# Patient Record
Sex: MALE | Race: ASIAN | NOT HISPANIC OR LATINO | Employment: OTHER | URBAN - METROPOLITAN AREA
[De-identification: names, ages, dates, MRNs, and addresses within clinical notes are randomized per-mention and may not be internally consistent; named-entity substitution may affect disease eponyms.]

---

## 2017-01-04 ENCOUNTER — TELEPHONE (OUTPATIENT)
Dept: MEDICAL GROUP | Facility: CLINIC | Age: 62
End: 2017-01-04

## 2017-01-04 NOTE — TELEPHONE ENCOUNTER
Patient would like to have a referral put in for ENT Dr. Angela Stanford.    He understand that if he needs an appointment prior that ok.

## 2017-01-05 NOTE — TELEPHONE ENCOUNTER
Please inform patient, we need to diagnosis for referral, he also has make a appointment for new primary care physician, Dr. Salguero no longer is in our office. Thanks

## 2017-02-10 ENCOUNTER — OFFICE VISIT (OUTPATIENT)
Dept: MEDICAL GROUP | Facility: CLINIC | Age: 62
End: 2017-02-10
Payer: OTHER GOVERNMENT

## 2017-02-10 VITALS
RESPIRATION RATE: 18 BRPM | WEIGHT: 140 LBS | HEIGHT: 64 IN | BODY MASS INDEX: 23.9 KG/M2 | DIASTOLIC BLOOD PRESSURE: 80 MMHG | TEMPERATURE: 98.4 F | HEART RATE: 76 BPM | SYSTOLIC BLOOD PRESSURE: 142 MMHG | OXYGEN SATURATION: 96 %

## 2017-02-10 DIAGNOSIS — Z85.46 HISTORY OF PROSTATE CANCER: ICD-10-CM

## 2017-02-10 DIAGNOSIS — N52.31 ERECTILE DYSFUNCTION FOLLOWING RADICAL PROSTATECTOMY: ICD-10-CM

## 2017-02-10 DIAGNOSIS — H91.92: ICD-10-CM

## 2017-02-10 DIAGNOSIS — D50.9 IRON DEFICIENCY ANEMIA, UNSPECIFIED IRON DEFICIENCY ANEMIA TYPE: ICD-10-CM

## 2017-02-10 PROCEDURE — 99214 OFFICE O/P EST MOD 30 MIN: CPT | Performed by: INTERNAL MEDICINE

## 2017-02-10 RX ORDER — ASCORBIC ACID 500 MG
500 TABLET ORAL DAILY
COMMUNITY
End: 2017-07-28

## 2017-02-10 NOTE — PROGRESS NOTES
"Subjective:  Christophe is a 61 y.o. male with the following   Past Medical History   Diagnosis Date   • Cancer (CMS-HCC) 2013     prostate      Family History   Problem Relation Age of Onset   • Cancer Father      prostate, age 90   • Diabetes Neg Hx    • Heart Disease Neg Hx    • Stroke Neg Hx      The patient is on the following medications:   Current outpatient prescriptions:   •  ascorbic acid (ASCORBIC ACID) 500 MG Tab, Take 500 mg by mouth every day., Disp: , Rfl:   •  Multiple Vitamin (ONE-A-DAY 55 PLUS PO), Take  by mouth., Disp: , Rfl:   •  ferrous sulfate 325 (65 FE) MG EC tablet, Take 1 Tab by mouth 3 times a day, with meals., Disp: 90 Tab, Rfl: 3    HPI; 61-year-old male patient is here to establish care, currently on ferrous sulfate for history of iron deficiency anemia denies having GI bleed, fatigue or shortness of breath. Patient has had history of radical prostatectomy for prostate cancer currently followed by urologist, denies having nocturia but complaining of erectile dysfunction after the surgery several years ago, previously responded to Viagra, patient prefers no medications requesting alternative therapies. Patient also complains of left ear diminished hearing requesting referral for audiology.  ROS:  See HPI    Blood pressure 142/80, pulse 76, temperature 36.9 °C (98.4 °F), resp. rate 18, height 1.626 m (5' 4.02\"), weight 63.504 kg (140 lb), SpO2 96 %.on RA  Objective:  Patient is well appearing and in no acute distress.  Pharynx is clear.  Neck is soft and supple with no cervical or supraclavicular lymphadenopathy, thyromegaly or masses, no JVD.  Lungs clear to auscultation bilaterally with normal respiratory effort. Abdomen soft, non-tender on palpation,not distended. Heart regular rate and rhythm without murmur. Extremities without any clubbing, cyanosis, or edema.    Assessment and Plan:  1. History of iron deficiency anemia; status post colonoscopy, on ferrous sulfate 325 mg 3 times a day, " followed by gastroenterology.      2. History of prostate cancer; status post radical prostatectomy.        3. ED; per patient  started after prostatectomy, previously responded to Viagra but prefers no medical therapy.        4. Left ear hearing loss; no significant cerumen accumulation. History of exposure to loud noises in .      Patient is advised on preventive and supportive care, diet and lifestyle modification, daily walking ,exercise and weight loss. Discussed alternative therapies, referral to audiology, monitor labs.     Please note that this dictation was created using voice recognition software. I have worked with consultants from the vendor as well as technical experts from [employer] to optimize the interface. I have made every reasonable attempt to correct obvious errors, but I expect that there are errors of grammar and possibly content that I did not discover before finalizing the note.

## 2017-02-10 NOTE — MR AVS SNAPSHOT
"        Christophe Bentley Darrius   2/10/2017 8:20 AM   Office Visit   MRN: [id number]    Department:  [facility]   Dept Phone:  [phone]    Description:  Male : 1955   Provider:  Mary Mclaughlin M.D.           Allergies as of 2/10/2017     Allergen Noted Reactions    Asa [Aspirin] 2010         You were diagnosed with     Iron deficiency anemia, unspecified iron deficiency anemia type   [9262172]       History of prostate cancer   [791447]       Hearing loss on left   [715868]         Vital Signs     Blood Pressure Pulse Temperature Respirations Height Weight    142/80 mmHg 76 36.9 °C (98.4 °F) 18 1.626 m (5' 4.02\") 63.504 kg (140 lb)    Body Mass Index Oxygen Saturation Smoking Status             24.02 kg/m2 96% Never Smoker          Basic Information     Date Of Birth Sex Race Ethnicity Preferred Language    1955 Male  Non- English      Your appointments     2017  8:15 AM   Adult Draw/Collection with LAB [facility]   [facility] (--)    [address] [facility] [address]   [phone]            Aug 11, 2017  8:20 AM   Established Patient with Mary Mclaughlin M.D.   [facility])    [address] [facility] [id number] [phone]           You will be receiving a confirmation call a few days before your appointment from our automated call confirmation system.              Problem List              ICD-10-CM Priority Class Noted - Resolved    Cancer (CMS-HCC) C80.1   Unknown - Present    SBO (small bowel obstruction) (CMS-HCC) K56.69 High  2014 - Present    Hypokalemia E87.6 Low  2014 - Present    History of prostate cancer Z85.46   3/12/2015 - Present    Right shoulder pain M25.511   3/12/2015 - Present    Weight loss R63.4   3/12/2015 - Present    Midline low back pain without sciatica M54.5   2015 - Present    Elevated blood sugar R73.9   2015 - Present    Episodic lightheadedness R42   2015 - " Present    Iron deficiency anemia D50.9   12/16/2015 - Present    Acute bronchitis J20.9   2/2/2016 - Present    Acid reflux K21.9   7/25/2016 - Present      Health Maintenance        Date Due Completion Dates    IMM DTaP/Tdap/Td Vaccine (1 - Tdap) 4/30/1972 ---    IMM ZOSTER VACCINE 4/30/2013 ---    IMM INFLUENZA (1) 9/1/2016 11/7/2014    COLONOSCOPY 2/24/2026 2/24/2016, 5/12/2010            Current Immunizations     Influenza Vaccine Quad Inj (Pf) 11/7/2014  9:44 AM      Below and/or attached are the medications your provider expects you to take. Review all of your home medications and newly ordered medications with your provider and/or pharmacist. Follow medication instructions as directed by your provider and/or pharmacist. Please keep your medication list with you and share with your provider. Update the information when medications are discontinued, doses are changed, or new medications (including over-the-counter products) are added; and carry medication information at all times in the event of emergency situations     Allergies:  ASA - (reactions not documented)               Medications  Valid as of: February 10, 2017 -  8:56 AM    Generic Name Brand Name Tablet Size Instructions for use    Ascorbic Acid (Tab) ascorbic acid 500 MG Take 500 mg by mouth every day.        Ferrous Sulfate (Tablet Delayed Response) ferrous sulfate 325 (65 FE) MG Take 1 Tab by mouth 3 times a day, with meals.        Multiple Vitamin   Take  by mouth.        .                 Medicines prescribed today were sent to:     [facility] #[id number] - [place] - [address]    [address] [place] [address]    Phone: [phone] Fax: [phone]    Open 24 Hours?: No      Medication refill instructions:       If your prescription bottle indicates you have medication refills left, it is not necessary to call your provider’s office. Please contact your pharmacy and they will refill your medication.    If your prescription bottle indicates you  do not have any refills left, you may request refills at any time through one of the following ways: The online Jugo system (except Urgent Care), by calling your provider’s office, or by asking your pharmacy to contact your provider’s office with a refill request. Medication refills are processed only during regular business hours and may not be available until the next business day. Your provider may request additional information or to have a follow-up visit with you prior to refilling your medication.   *Please Note: Medication refills are assigned a new Rx number when refilled electronically. Your pharmacy may indicate that no refills were authorized even though a new prescription for the same medication is available at the pharmacy. Please request the medicine by name with the pharmacy before contacting your provider for a refill.        Your To Do List     Future Labs/Procedures Complete By Expires    CBC WITH DIFFERENTIAL  As directed 2/11/2018    FERRITIN  As directed 2/11/2018    LIPOPROTEIN A  As directed 2/10/2018    VITAMIN D,25 HYDROXY  As directed 2/10/2018      Referral     A referral request has been sent to our patient care coordination department. Please allow 3-5 business days for us to process this request and contact you either by phone or mail. If you do not hear from us by the 5th business day, please call us at [phone].           Jugo Access Code: Activation code not generated  Current Jugo Status: Active

## 2017-02-16 ENCOUNTER — HOSPITAL ENCOUNTER (OUTPATIENT)
Dept: LAB | Facility: MEDICAL CENTER | Age: 62
End: 2017-02-16
Attending: INTERNAL MEDICINE
Payer: OTHER GOVERNMENT

## 2017-02-16 DIAGNOSIS — D50.9 IRON DEFICIENCY ANEMIA, UNSPECIFIED IRON DEFICIENCY ANEMIA TYPE: ICD-10-CM

## 2017-02-16 LAB
25(OH)D3 SERPL-MCNC: 30 NG/ML (ref 30–100)
BASOPHILS # BLD AUTO: 0.06 K/UL (ref 0–0.12)
BASOPHILS NFR BLD AUTO: 1 % (ref 0–1.8)
EOSINOPHIL # BLD: 0.14 K/UL (ref 0–0.51)
EOSINOPHIL NFR BLD AUTO: 2.3 % (ref 0–6.9)
ERYTHROCYTE [DISTWIDTH] IN BLOOD BY AUTOMATED COUNT: 40.4 FL (ref 35.9–50)
ERYTHROCYTE [DISTWIDTH] IN BLOOD BY AUTOMATED COUNT: 41 FL (ref 35.9–50)
FERRITIN SERPL-MCNC: 13.3 NG/ML (ref 22–322)
FERRITIN SERPL-MCNC: 13.7 NG/ML (ref 22–322)
HCT VFR BLD AUTO: 44.6 % (ref 42–52)
HCT VFR BLD AUTO: 45.4 % (ref 42–52)
HGB BLD-MCNC: 14.2 G/DL (ref 14–18)
HGB BLD-MCNC: 14.2 G/DL (ref 14–18)
IMM GRANULOCYTES # BLD AUTO: 0.01 K/UL (ref 0–0.11)
IMM GRANULOCYTES NFR BLD AUTO: 0.2 % (ref 0–0.9)
IRON SATN MFR SERPL: 9 % (ref 15–55)
IRON SATN MFR SERPL: 9 % (ref 15–55)
IRON SERPL-MCNC: 39 UG/DL (ref 50–180)
IRON SERPL-MCNC: 40 UG/DL (ref 50–180)
LYMPHOCYTES # BLD: 1.93 K/UL (ref 1–4.8)
LYMPHOCYTES NFR BLD AUTO: 31.8 % (ref 22–41)
MCH RBC QN AUTO: 25.6 PG (ref 27–33)
MCH RBC QN AUTO: 25.9 PG (ref 27–33)
MCHC RBC AUTO-ENTMCNC: 31.3 G/DL (ref 33.7–35.3)
MCHC RBC AUTO-ENTMCNC: 31.8 G/DL (ref 33.7–35.3)
MCV RBC AUTO: 81.4 FL (ref 81.4–97.8)
MCV RBC AUTO: 81.9 FL (ref 81.4–97.8)
MONOCYTES # BLD: 0.42 K/UL (ref 0–0.85)
MONOCYTES NFR BLD AUTO: 6.9 % (ref 0–13.4)
NEUTROPHILS # BLD: 3.5 K/UL (ref 1.82–7.42)
NEUTROPHILS NFR BLD AUTO: 57.8 % (ref 44–72)
NRBC # BLD AUTO: 0 K/UL
NRBC BLD-RTO: 0 /100 WBC
PLATELET # BLD AUTO: 321 K/UL (ref 164–446)
PLATELET # BLD AUTO: 322 K/UL (ref 164–446)
PMV BLD AUTO: 8.8 FL (ref 9–12.9)
PMV BLD AUTO: 9.1 FL (ref 9–12.9)
RBC # BLD AUTO: 5.48 M/UL (ref 4.7–6.1)
RBC # BLD AUTO: 5.54 M/UL (ref 4.7–6.1)
TIBC SERPL-MCNC: 454 UG/DL (ref 250–450)
TIBC SERPL-MCNC: 459 UG/DL (ref 250–450)
WBC # BLD AUTO: 5.9 K/UL (ref 4.8–10.8)
WBC # BLD AUTO: 6.1 K/UL (ref 4.8–10.8)

## 2017-02-16 PROCEDURE — 83540 ASSAY OF IRON: CPT

## 2017-02-16 PROCEDURE — 36415 COLL VENOUS BLD VENIPUNCTURE: CPT

## 2017-02-16 PROCEDURE — 83540 ASSAY OF IRON: CPT | Mod: 91

## 2017-02-16 PROCEDURE — 83550 IRON BINDING TEST: CPT

## 2017-02-16 PROCEDURE — 85027 COMPLETE CBC AUTOMATED: CPT

## 2017-02-16 PROCEDURE — 83695 ASSAY OF LIPOPROTEIN(A): CPT

## 2017-02-16 PROCEDURE — 82728 ASSAY OF FERRITIN: CPT

## 2017-02-16 PROCEDURE — 85025 COMPLETE CBC W/AUTO DIFF WBC: CPT

## 2017-02-16 PROCEDURE — 82306 VITAMIN D 25 HYDROXY: CPT

## 2017-02-16 PROCEDURE — 83550 IRON BINDING TEST: CPT | Mod: 91

## 2017-02-16 PROCEDURE — 82728 ASSAY OF FERRITIN: CPT | Mod: 91

## 2017-02-18 LAB — LPA SERPL-MCNC: 5 MG/DL

## 2017-03-03 ENCOUNTER — TELEPHONE (OUTPATIENT)
Dept: MEDICAL GROUP | Facility: CLINIC | Age: 62
End: 2017-03-03

## 2017-03-03 DIAGNOSIS — H91.92 HEARING LOSS, LEFT: ICD-10-CM

## 2017-03-04 NOTE — TELEPHONE ENCOUNTER
Patient went to see the specialist for his hearing loss but could not do anything wax was to impacted in ears and needs Referral to ENT    Please Advice

## 2017-06-17 ENCOUNTER — HOSPITAL ENCOUNTER (OUTPATIENT)
Dept: LAB | Facility: MEDICAL CENTER | Age: 62
End: 2017-06-17
Attending: INTERNAL MEDICINE
Payer: OTHER GOVERNMENT

## 2017-06-17 LAB
FERRITIN SERPL-MCNC: 25.6 NG/ML (ref 22–322)
IRON SATN MFR SERPL: 7 % (ref 15–55)
IRON SERPL-MCNC: 30 UG/DL (ref 50–180)
TIBC SERPL-MCNC: 423 UG/DL (ref 250–450)

## 2017-06-17 PROCEDURE — 36415 COLL VENOUS BLD VENIPUNCTURE: CPT

## 2017-06-17 PROCEDURE — 83550 IRON BINDING TEST: CPT

## 2017-06-17 PROCEDURE — 83540 ASSAY OF IRON: CPT

## 2017-06-17 PROCEDURE — 82728 ASSAY OF FERRITIN: CPT

## 2017-07-06 ENCOUNTER — APPOINTMENT (OUTPATIENT)
Dept: RADIOLOGY | Facility: MEDICAL CENTER | Age: 62
End: 2017-07-06
Attending: EMERGENCY MEDICINE
Payer: OTHER GOVERNMENT

## 2017-07-06 ENCOUNTER — HOSPITAL ENCOUNTER (EMERGENCY)
Facility: MEDICAL CENTER | Age: 62
End: 2017-07-06
Attending: EMERGENCY MEDICINE
Payer: OTHER GOVERNMENT

## 2017-07-06 VITALS
DIASTOLIC BLOOD PRESSURE: 84 MMHG | HEART RATE: 57 BPM | HEIGHT: 64 IN | TEMPERATURE: 97.9 F | WEIGHT: 137.13 LBS | BODY MASS INDEX: 23.41 KG/M2 | SYSTOLIC BLOOD PRESSURE: 149 MMHG | OXYGEN SATURATION: 95 % | RESPIRATION RATE: 16 BRPM

## 2017-07-06 DIAGNOSIS — R10.84 GENERALIZED ABDOMINAL PAIN: ICD-10-CM

## 2017-07-06 DIAGNOSIS — R11.2 NON-INTRACTABLE VOMITING WITH NAUSEA, UNSPECIFIED VOMITING TYPE: ICD-10-CM

## 2017-07-06 LAB
ALBUMIN SERPL BCP-MCNC: 4.2 G/DL (ref 3.2–4.9)
ALBUMIN/GLOB SERPL: 1.3 G/DL
ALP SERPL-CCNC: 56 U/L (ref 30–99)
ALT SERPL-CCNC: 14 U/L (ref 2–50)
ANION GAP SERPL CALC-SCNC: 11 MMOL/L (ref 0–11.9)
APPEARANCE UR: CLEAR
AST SERPL-CCNC: 19 U/L (ref 12–45)
BASOPHILS # BLD AUTO: 0.4 % (ref 0–1.8)
BASOPHILS # BLD: 0.05 K/UL (ref 0–0.12)
BILIRUB SERPL-MCNC: 0.5 MG/DL (ref 0.1–1.5)
BILIRUB UR QL STRIP.AUTO: NEGATIVE
BUN SERPL-MCNC: 11 MG/DL (ref 8–22)
CALCIUM SERPL-MCNC: 9.4 MG/DL (ref 8.5–10.5)
CHLORIDE SERPL-SCNC: 106 MMOL/L (ref 96–112)
CO2 SERPL-SCNC: 21 MMOL/L (ref 20–33)
COLOR UR: YELLOW
COMMENT 1642: NORMAL
CREAT SERPL-MCNC: 0.86 MG/DL (ref 0.5–1.4)
EOSINOPHIL # BLD AUTO: 0.07 K/UL (ref 0–0.51)
EOSINOPHIL NFR BLD: 0.5 % (ref 0–6.9)
ERYTHROCYTE [DISTWIDTH] IN BLOOD BY AUTOMATED COUNT: 42.6 FL (ref 35.9–50)
GFR SERPL CREATININE-BSD FRML MDRD: >60 ML/MIN/1.73 M 2
GLOBULIN SER CALC-MCNC: 3.2 G/DL (ref 1.9–3.5)
GLUCOSE SERPL-MCNC: 129 MG/DL (ref 65–99)
GLUCOSE UR STRIP.AUTO-MCNC: NEGATIVE MG/DL
HCT VFR BLD AUTO: 46.6 % (ref 42–52)
HGB BLD-MCNC: 14.5 G/DL (ref 14–18)
IMM GRANULOCYTES # BLD AUTO: 0.07 K/UL (ref 0–0.11)
IMM GRANULOCYTES NFR BLD AUTO: 0.5 % (ref 0–0.9)
KETONES UR STRIP.AUTO-MCNC: NEGATIVE MG/DL
LEUKOCYTE ESTERASE UR QL STRIP.AUTO: NEGATIVE
LIPASE SERPL-CCNC: 38 U/L (ref 11–82)
LYMPHOCYTES # BLD AUTO: 1.23 K/UL (ref 1–4.8)
LYMPHOCYTES NFR BLD: 9.1 % (ref 22–41)
MCH RBC QN AUTO: 25.8 PG (ref 27–33)
MCHC RBC AUTO-ENTMCNC: 31.1 G/DL (ref 33.7–35.3)
MCV RBC AUTO: 82.8 FL (ref 81.4–97.8)
MICRO URNS: NORMAL
MONOCYTES # BLD AUTO: 0.63 K/UL (ref 0–0.85)
MONOCYTES NFR BLD AUTO: 4.6 % (ref 0–13.4)
MORPHOLOGY BLD-IMP: NORMAL
NEUTROPHILS # BLD AUTO: 11.51 K/UL (ref 1.82–7.42)
NEUTROPHILS NFR BLD: 84.9 % (ref 44–72)
NITRITE UR QL STRIP.AUTO: NEGATIVE
NRBC # BLD AUTO: 0 K/UL
NRBC BLD AUTO-RTO: 0 /100 WBC
PH UR STRIP.AUTO: 7.5 [PH]
PLATELET # BLD AUTO: 219 K/UL (ref 164–446)
PMV BLD AUTO: 9.7 FL (ref 9–12.9)
POTASSIUM SERPL-SCNC: 4 MMOL/L (ref 3.6–5.5)
PROT SERPL-MCNC: 7.4 G/DL (ref 6–8.2)
PROT UR QL STRIP: NEGATIVE MG/DL
RBC # BLD AUTO: 5.63 M/UL (ref 4.7–6.1)
RBC UR QL AUTO: NEGATIVE
SODIUM SERPL-SCNC: 138 MMOL/L (ref 135–145)
SP GR UR STRIP.AUTO: 1.01
WBC # BLD AUTO: 13.6 K/UL (ref 4.8–10.8)

## 2017-07-06 PROCEDURE — 80053 COMPREHEN METABOLIC PANEL: CPT

## 2017-07-06 PROCEDURE — 36415 COLL VENOUS BLD VENIPUNCTURE: CPT

## 2017-07-06 PROCEDURE — 74177 CT ABD & PELVIS W/CONTRAST: CPT

## 2017-07-06 PROCEDURE — 700117 HCHG RX CONTRAST REV CODE 255: Performed by: EMERGENCY MEDICINE

## 2017-07-06 PROCEDURE — 83690 ASSAY OF LIPASE: CPT

## 2017-07-06 PROCEDURE — 99284 EMERGENCY DEPT VISIT MOD MDM: CPT

## 2017-07-06 PROCEDURE — 85025 COMPLETE CBC W/AUTO DIFF WBC: CPT

## 2017-07-06 PROCEDURE — 81003 URINALYSIS AUTO W/O SCOPE: CPT

## 2017-07-06 RX ORDER — ONDANSETRON 4 MG/1
4 TABLET, ORALLY DISINTEGRATING ORAL EVERY 8 HOURS PRN
Qty: 10 TAB | Refills: 0 | Status: SHIPPED | OUTPATIENT
Start: 2017-07-06 | End: 2017-07-28

## 2017-07-06 RX ORDER — ONDANSETRON 4 MG/1
4 TABLET, ORALLY DISINTEGRATING ORAL EVERY 8 HOURS PRN
Qty: 10 TAB | Refills: 0 | Status: SHIPPED | OUTPATIENT
Start: 2017-07-06 | End: 2017-07-06

## 2017-07-06 RX ADMIN — IOHEXOL 100 ML: 350 INJECTION, SOLUTION INTRAVENOUS at 22:23

## 2017-07-06 RX ADMIN — IOHEXOL 50 ML: 240 INJECTION, SOLUTION INTRATHECAL; INTRAVASCULAR; INTRAVENOUS; ORAL at 22:28

## 2017-07-06 ASSESSMENT — PAIN SCALES - GENERAL: PAINLEVEL_OUTOF10: 7

## 2017-07-06 ASSESSMENT — LIFESTYLE VARIABLES: DO YOU DRINK ALCOHOL: NO

## 2017-07-06 NOTE — ED AVS SNAPSHOT
7/6/2017    Christophe Rizo  [address] [place] [address]    Dear Christophe:    [facility] wants to ensure your discharge home is safe and you or your loved ones have had all of your questions answered regarding your care after you leave the hospital.    Below is a list of resources and contact information should you have any questions regarding your hospital stay, follow-up instructions, or active medical symptoms.    Questions or Concerns Regarding… Contact   Medical Questions Related to Your Discharge  (7 days a week, 8am-5pm) Contact a Nurse Care Coordinator   [phone]   Medical Questions Not Related to Your Discharge  (24 hours a day / 7 days a week)  Contact the Nurse Health Line   [phone]    Medications or Discharge Instructions Refer to your discharge packet   or contact your [facility] Primary Care Provider   [phone]   Follow-up Appointment(s) Schedule your appointment via [employer]   or contact Scheduling [phone]   Billing Review your statement via [employer]  or contact Billing [phone]   Medical Records Review your records via [employer]   or contact Medical Records [phone]     You may receive a telephone call within two days of discharge. This call is to make certain you understand your discharge instructions and have the opportunity to have any questions answered. You can also easily access your medical information, test results and upcoming appointments via the [employer] free online health management tool. You can learn more and sign up at [employer]. For assistance setting up your [employer] account, please call [phone].    Once again, we want to ensure your discharge home is safe and that you have a clear understanding of any next steps in your care. If you have any questions or concerns, please do not hesitate to contact us, we are here for you. Thank you for choosing [facility] for your healthcare needs.    Sincerely,    Your [facility] Healthcare Team

## 2017-07-06 NOTE — ED AVS SNAPSHOT
Interventional Spine Access Code: Activation code not generated  Current Interventional Spine Status: Active    [employer]  A secure, online tool to manage your health information     [employer]’s Interventional Spine® is a secure, online tool that connects you to your personalized health information from the privacy of your home -- day or night - making it very easy for you to manage your healthcare. Once the activation process is completed, you can even access your medical information using the Interventional Spine job, which is available for free in the Apple Job store or Google Play store.     Interventional Spine provides the following levels of access (as shown below):   My Chart Features   [facility] Primary Care Doctor [facility]  Specialists [facility]  Urgent  Care Non-[facility]  Primary Care  Doctor   Email your healthcare team securely and privately 24/7 X X X X   Manage appointments: schedule your next appointment; view details of past/upcoming appointments X      Request prescription refills. X      View recent personal medical records, including lab and immunizations X X X X   View health record, including health history, allergies, medications X X X X   Read reports about your outpatient visits, procedures, consult and ER notes X X X X   See your discharge summary, which is a recap of your hospital and/or ER visit that includes your diagnosis, lab results, and care plan. X X       How to register for Interventional Spine:  1. Go to  [email or web address].  2. Click on the Sign Up Now box, which takes you to the New Member Sign Up page. You will need to provide the following information:  a. Enter your Interventional Spine Access Code exactly as it appears at the top of this page. (You will not need to use this code after you’ve completed the sign-up process. If you do not sign up before the expiration date, you must request a new code.)   b. Enter your date of birth.   c. Enter your home email address.   d. Click Submit, and follow the next screen’s instructions.  3. Create a Interventional Spine ID. This will  be your Miro login ID and cannot be changed, so think of one that is secure and easy to remember.  4. Create a Miro password. You can change your password at any time.  5. Enter your Password Reset Question and Answer. This can be used at a later time if you forget your password.   6. Enter your e-mail address. This allows you to receive e-mail notifications when new information is available in Miro.  7. Click Sign Up. You can now view your health information.    For assistance activating your Miro account, call [phone]

## 2017-07-06 NOTE — ED AVS SNAPSHOT
Home Care Instructions                                                                                                                Christophe Rizo   MRN: [id number]    Department:  [facility], Emergency Dept   Date of Visit:  7/6/2017            [facility], Emergency Dept    [address]    [place] [address]    Phone:  [phone]      You were seen by     Heron Mendoza M.D.      Your Diagnosis Was     Generalized abdominal pain     R10.84       These are the medications you received during your hospitalization from 07/06/2017 1801 to 07/06/2017 2317     Date/Time Order Dose Route Action    07/06/2017 2223 iohexol (OMNIPAQUE) 350 mg/mL 100 mL Intravenous Given    07/06/2017 2228 iohexol (OMNIPAQUE) 240 mg/mL 50 mL Intravenous Given      Follow-up Information     1. Follow up with Mary Mclaughlin M.D. In 1 day.    Specialty:  Internal Medicine    Contact information    [address]  [place] [address] [phone]        Medication Information     Review all of your home medications and newly ordered medications with your primary doctor and/or pharmacist as soon as possible. Follow medication instructions as directed by your doctor and/or pharmacist.     Please keep your complete medication list with you and share with your physician. Update the information when medications are discontinued, doses are changed, or new medications (including over-the-counter products) are added; and carry medication information at all times in the event of emergency situations.               Medication List      START taking these medications        Instructions    Morning Afternoon Evening Bedtime    ondansetron 4 MG Tbdp   Commonly known as:  ZOFRAN ODT        Take 1 Tab by mouth every 8 hours as needed for Nausea/Vomiting.   Dose:  4 mg                          ASK your doctor about these medications        Instructions    Morning Afternoon Evening Bedtime    ascorbic  acid 500 MG Tabs   Commonly known as:  ascorbic acid        Take 500 mg by mouth every day.   Dose:  500 mg                        ferrous sulfate 325 (65 FE) MG EC tablet        Take 1 Tab by mouth 3 times a day, with meals.   Dose:  325 mg                        ONE-A-DAY 55 PLUS PO        Take  by mouth.                             Where to Get Your Medications      These medications were sent to [facility]/PHARMACY #[id number] - [place] - [address]  [address] [place] [address]     Phone:  [phone]    - ondansetron 4 MG Tbdp            Procedures and tests performed during your visit     CBC WITH DIFFERENTIAL    COMP METABOLIC PANEL    CONSENT FOR CONTRAST INJECTION    CT-ABDOMEN-PELVIS WITH    DIFFERENTIAL COMMENT    ESTIMATED GFR    LIPASE    PERIPHERAL SMEAR REVIEW    URINALYSIS (UA)        Discharge Instructions       Return immediately to the Emergency Department if you experience continuing or worsening discomfort in your abdomen, fever, chest pain, difficulty breathing, back pain, or any other new or worsening symptoms.       Abdominal Pain (Nonspecific)  Your exam might not show the exact reason you have abdominal pain. Since there are many different causes of abdominal pain, another checkup and more tests may be needed. It is very important to follow up for lasting (persistent) or worsening symptoms. A possible cause of abdominal pain in any person who still has his or her appendix is acute appendicitis. Appendicitis is often hard to diagnose. Normal blood tests, urine tests, ultrasound, and CT scans do not completely rule out early appendicitis or other causes of abdominal pain. Sometimes, only the changes that happen over time will allow appendicitis and other causes of abdominal pain to be determined. Other potential problems that may require surgery may also take time to become more apparent. Because of this, it is important that you follow all of the instructions below.  HOME CARE INSTRUCTIONS   · Rest  as much as possible.   · Do not eat solid food until your pain is gone.   · While adults or children have pain: A diet of water, weak decaffeinated tea, broth or bouillon, gelatin, oral rehydration solutions (ORS), frozen ice pops, or ice chips may be helpful.   · When pain is gone in adults or children: Start a light diet (dry toast, crackers, applesauce, or white rice). Increase the diet slowly as long as it does not bother you. Eat no dairy products (including cheese and eggs) and no spicy, fatty, fried, or high-fiber foods.   · Use no alcohol, caffeine, or cigarettes.   · Take your regular medicines unless your caregiver told you not to.   · Take any prescribed medicine as directed.   · Only take over-the-counter or prescription medicines for pain, discomfort, or fever as directed by your caregiver. Do not give aspirin to children.   If your caregiver has given you a follow-up appointment, it is very important to keep that appointment. Not keeping the appointment could result in a permanent injury and/or lasting (chronic) pain and/or disability. If there is any problem keeping the appointment, you must call to reschedule.   SEEK IMMEDIATE MEDICAL CARE IF:   · Your pain is not gone in 24 hours.   · Your pain becomes worse, changes location, or feels different.   · You or your child has an oral temperature above 102° F (38.9° C), not controlled by medicine.   · Your baby is older than 3 months with a rectal temperature of 102° F (38.9° C) or higher.   · Your baby is 3 months old or younger with a rectal temperature of 100.4° F (38° C) or higher.   · You have shaking chills.   · You keep throwing up (vomiting) or cannot drink liquids.   · There is blood in your vomit or you see blood in your bowel movements.   · Your bowel movements become dark or black.   · You have frequent bowel movements.   · Your bowel movements stop (become blocked) or you cannot pass gas.   · You have bloody, frequent, or painful urination.     · You have yellow discoloration in the skin or whites of the eyes.   · Your stomach becomes bloated or bigger.   · You have dizziness or fainting.   · You have chest or back pain.   MAKE SURE YOU:   · Understand these instructions.   · Will watch your condition.   · Will get help right away if you are not doing well or get worse.   Document Released: 12/18/2006 Document Revised: 03/11/2013 Document Reviewed: 11/15/2010  ExitCare® Patient Information ©2013 [employer].          Patient Information     Patient Information    Following emergency treatment: all patient requiring follow-up care must return either to a private physician or a clinic if your condition worsens before you are able to obtain further medical attention, please return to the emergency room.     Billing Information    At [facility], we work to make the billing process streamlined for our patients.  Our Representatives are here to answer any questions you may have regarding your hospital bill.  If you have insurance coverage and have supplied your insurance information to us, we will submit a claim to your insurer on your behalf.  Should you have any questions regarding your bill, we can be reached online or by phone as follows:  Online: You are able pay your bills online or live chat with our representatives about any billing questions you may have. We are here to help Monday - Friday from 8:00am to 7:30pm and 9:00am - 12:00pm on Saturdays.  Please visit [email or web address]  for more information.   Phone:  [phone] or [phone]    Please note that your emergency physician, surgeon, pathologist, radiologist, anesthesiologist, and other specialists are not employed by [facility] and will therefore bill separately for their services.  Please contact them directly for any questions concerning their bills at the numbers below:     Emergency Physician Services:  [phone]  [facility]:   [phone]  [facility]:  [phone]  [facility]:  [phone]    1. Your final bill may vary from the amount quoted upon discharge if all procedures are not complete at that time, or if your doctor has additional procedures of which we are not aware. You will receive an additional bill if you return to the Emergency Department at [facility] for suture removal regardless of the facility of which the sutures were placed.     2. Please arrange for settlement of this account at the emergency registration.    3. All self-pay accounts are due in full at the time of treatment.  If you are unable to meet this obligation then payment is expected within 4-5 days.     4. If you have had radiology studies (CT, X-ray, Ultrasound, MRI), you have received a preliminary result during your emergency department visit. Please contact the radiology department [phone] to receive a copy of your final result. Please discuss the Final result with your primary physician or with the follow up physician provided.     Crisis Hotline:  [place] Crisis Hotline:  7-645-OLTQTSN or 1-461.312.9110  [place] Crisis Hotline:    1-742.633.4822 or 853-171-1397         ED Discharge Follow Up Questions    1. In order to provide you with very good care, we would like to follow up with a phone call in the next few days.  May we have your permission to contact you?     YES /  NO    2. What is the best phone number to call you? (       )_____-__________    3. What is the best time to call you?      Morning  /  Afternoon  /  Evening                   Patient Signature:  ____________________________________________________________    Date:  ____________________________________________________________      Your appointments     Aug 11, 2017  8:20 AM   Established Patient with Mary Mclaughlin M.D.   [facility] [place] (--)    [address]  [address]  [place] [address] [phone]           You will be  receiving a confirmation call a few days before your appointment from our automated call confirmation system.

## 2017-07-07 NOTE — DISCHARGE INSTRUCTIONS
Return immediately to the Emergency Department if you experience continuing or worsening discomfort in your abdomen, fever, chest pain, difficulty breathing, back pain, or any other new or worsening symptoms.       Abdominal Pain (Nonspecific)  Your exam might not show the exact reason you have abdominal pain. Since there are many different causes of abdominal pain, another checkup and more tests may be needed. It is very important to follow up for lasting (persistent) or worsening symptoms. A possible cause of abdominal pain in any person who still has his or her appendix is acute appendicitis. Appendicitis is often hard to diagnose. Normal blood tests, urine tests, ultrasound, and CT scans do not completely rule out early appendicitis or other causes of abdominal pain. Sometimes, only the changes that happen over time will allow appendicitis and other causes of abdominal pain to be determined. Other potential problems that may require surgery may also take time to become more apparent. Because of this, it is important that you follow all of the instructions below.  HOME CARE INSTRUCTIONS   · Rest as much as possible.   · Do not eat solid food until your pain is gone.   · While adults or children have pain: A diet of water, weak decaffeinated tea, broth or bouillon, gelatin, oral rehydration solutions (ORS), frozen ice pops, or ice chips may be helpful.   · When pain is gone in adults or children: Start a light diet (dry toast, crackers, applesauce, or white rice). Increase the diet slowly as long as it does not bother you. Eat no dairy products (including cheese and eggs) and no spicy, fatty, fried, or high-fiber foods.   · Use no alcohol, caffeine, or cigarettes.   · Take your regular medicines unless your caregiver told you not to.   · Take any prescribed medicine as directed.   · Only take over-the-counter or prescription medicines for pain, discomfort, or fever as directed by your caregiver. Do not give  aspirin to children.   If your caregiver has given you a follow-up appointment, it is very important to keep that appointment. Not keeping the appointment could result in a permanent injury and/or lasting (chronic) pain and/or disability. If there is any problem keeping the appointment, you must call to reschedule.   SEEK IMMEDIATE MEDICAL CARE IF:   · Your pain is not gone in 24 hours.   · Your pain becomes worse, changes location, or feels different.   · You or your child has an oral temperature above 102° F (38.9° C), not controlled by medicine.   · Your baby is older than 3 months with a rectal temperature of 102° F (38.9° C) or higher.   · Your baby is 3 months old or younger with a rectal temperature of 100.4° F (38° C) or higher.   · You have shaking chills.   · You keep throwing up (vomiting) or cannot drink liquids.   · There is blood in your vomit or you see blood in your bowel movements.   · Your bowel movements become dark or black.   · You have frequent bowel movements.   · Your bowel movements stop (become blocked) or you cannot pass gas.   · You have bloody, frequent, or painful urination.   · You have yellow discoloration in the skin or whites of the eyes.   · Your stomach becomes bloated or bigger.   · You have dizziness or fainting.   · You have chest or back pain.   MAKE SURE YOU:   · Understand these instructions.   · Will watch your condition.   · Will get help right away if you are not doing well or get worse.   Document Released: 12/18/2006 Document Revised: 03/11/2013 Document Reviewed: 11/15/2010  [employer]® Patient Information ©2013 [employer] [place].

## 2017-07-07 NOTE — ED PROVIDER NOTES
"ED Provider Note    CHIEF COMPLAINT  Chief Complaint   Patient presents with   • Abdominal Pain   • Vomiting       HPI  Christophe Rizo is a 62 y.o. male who presents for evaluation of generalized abdominal pain and vomiting began this morning and has been progressive, consistent symptoms with the small bowel obstruction he has experienced in the past. He denies any abnormal bowel movements, no fever, no chest pain, no shortness of breath. He reports initial symptom was pain that began approximately 10 hours ago followed by vomiting over the past 4 hours. No back pain. No other complaints offered by the patient at this time    REVIEW OF SYSTEMS  Negative for fever, rash, chest pain, dyspnea, diarrhea, headache, back pain. All other systems are negative.     PAST MEDICAL HISTORY  Past Medical History   Diagnosis Date   • Cancer (CMS-[facility]) 2013     prostate       FAMILY HISTORY  Family History   Problem Relation Age of Onset   • Cancer Father      prostate, age 90   • Diabetes Neg Hx    • Heart Disease Neg Hx    • Stroke Neg Hx        SOCIAL HISTORY  Social History   Substance Use Topics   • Smoking status: Never Smoker    • Smokeless tobacco: Never Used      Comment: quit 1976,<1 pckyr   • Alcohol Use: Yes      Comment: occ       SURGICAL HISTORY  Past Surgical History   Procedure Laterality Date   • Bowel resection  1996     volvulus, small intestine   • Elbow exploration  2005     R elbow   • Colonoscopy  1996, 2009     neg   • Prostatectomy, radial Bilateral        CURRENT MEDICATIONS  I personally reviewed the medication list in the charting documentation.     ALLERGIES  Allergies   Allergen Reactions   • Asa [Aspirin]        MEDICAL RECORD  I have reviewed patient's medical record and pertinent results are listed above.      PHYSICAL EXAM  VITAL SIGNS: /84 mmHg  Pulse 76  Temp(Src) 36.6 °C (97.9 °F)  Resp 16  Ht 1.626 m (5' 4\")  Wt 62.2 kg (137 lb 2 oz)  BMI 23.53 kg/m2  SpO2 98% "   Constitutional: Well appearing patient in no acute distress.  Not toxic, nor ill in appearance.  HENT: Mucus membranes moist.    Eyes: No scleral icterus. Normal conjunctiva   Neck: Supple, comfortable, nonpainful range of motion.   Cardiovascular: Regular heart rate and rhythm.   Thorax & Lungs: Chest is nontender.  Lungs are clear to auscultation with good air movement bilaterally.  No wheeze, rhonchi, nor rales.   Abdomen: Soft, no obvious distention, generalized tenderness without rebound or guarding   Skin: Warm, dry. No rash appreciated  Extremities/Musculoskeletal: No sign of trauma. No asymmetric calf tenderness, erythema or edema. Normal range of motion   Neurologic: Alert & oriented. No focal deficits observed.   Psychiatric: Normal affect appropriate for the clinical situation.    DIAGNOSTIC STUDIES / PROCEDURES    LABS  Results for orders placed or performed during the hospital encounter of 07/06/17   CBC WITH DIFFERENTIAL   Result Value Ref Range    WBC 13.6 (H) 4.8 - 10.8 K/uL    RBC 5.63 4.70 - 6.10 M/uL    Hemoglobin 14.5 14.0 - 18.0 g/dL    Hematocrit 46.6 42.0 - 52.0 %    MCV 82.8 81.4 - 97.8 fL    MCH 25.8 (L) 27.0 - 33.0 pg    MCHC 31.1 (L) 33.7 - 35.3 g/dL    RDW 42.6 35.9 - 50.0 fL    Platelet Count 219 164 - 446 K/uL    MPV 9.7 9.0 - 12.9 fL    Neutrophils-Polys 84.90 (H) 44.00 - 72.00 %    Lymphocytes 9.10 (L) 22.00 - 41.00 %    Monocytes 4.60 0.00 - 13.40 %    Eosinophils 0.50 0.00 - 6.90 %    Basophils 0.40 0.00 - 1.80 %    Immature Granulocytes 0.50 0.00 - 0.90 %    Nucleated RBC 0.00 /100 WBC    Lymphs (Absolute) 1.23 1.00 - 4.80 K/uL    Monos (Absolute) 0.63 0.00 - 0.85 K/uL    Eos (Absolute) 0.07 0.00 - 0.51 K/uL    Baso (Absolute) 0.05 0.00 - 0.12 K/uL    Immature Granulocytes (abs) 0.07 0.00 - 0.11 K/uL    NRBC (Absolute) 0.00 K/uL    Neutrophils (Absolute) 11.51 (H) 1.82 - 7.42 K/uL   COMP METABOLIC PANEL   Result Value Ref Range    Sodium 138 135 - 145 mmol/L    Potassium 4.0 3.6  - 5.5 mmol/L    Chloride 106 96 - 112 mmol/L    Co2 21 20 - 33 mmol/L    Anion Gap 11.0 0.0 - 11.9    Glucose 129 (H) 65 - 99 mg/dL    Bun 11 8 - 22 mg/dL    Creatinine 0.86 0.50 - 1.40 mg/dL    Calcium 9.4 8.5 - 10.5 mg/dL    AST(SGOT) 19 12 - 45 U/L    ALT(SGPT) 14 2 - 50 U/L    Alkaline Phosphatase 56 30 - 99 U/L    Total Bilirubin 0.5 0.1 - 1.5 mg/dL    Albumin 4.2 3.2 - 4.9 g/dL    Total Protein 7.4 6.0 - 8.2 g/dL    Globulin 3.2 1.9 - 3.5 g/dL    A-G Ratio 1.3 g/dL   LIPASE   Result Value Ref Range    Lipase 38 11 - 82 U/L   PERIPHERAL SMEAR REVIEW   Result Value Ref Range    Peripheral Smear Review see below    DIFFERENTIAL COMMENT   Result Value Ref Range    Comments-Diff see below    ESTIMATED GFR   Result Value Ref Range    GFR If African American >60 >60 mL/min/1.73 m 2    GFR If Non African American >60 >60 mL/min/1.73 m 2         RADIOLOGY  CT-ABDOMEN-PELVIS WITH   Final Result      1.  No acute abnormality of the abdomen or pelvis.   2.  Post surgical changes of the small bowel. No evidence of obstruction.   3.  Postsurgical changes of the pelvis.   4.  Atherosclerosis.            COURSE & MEDICAL DECISION MAKING  I have reviewed any medical record information, laboratory studies and radiographic results as noted above.  Differential diagnoses includes: Bowel obstruction, pancreatitis, hepatitis, cholecystitis, gastritis, PUD, gastroenteritis, dehydration, electrolyte abnormalities, anemia    Encounter Summary: This is a 62 y.o. male with abdominal pain and vomited today, consistent with prior bowel obstruction, on exam he has generalized tenderness but no evidence of peritonitis. Blood work that was ordered by the nursing staff as unremarkable, minimal leukocytosis, normal lipase. My plan is to check a CT scan of the abdomen and pelvis to rule out bowel obstruction given his history. At this time he states he does not need pain medication as the most recent time he vomited improve the pain. Will  medicate if needed ------ CT is negative for any acute abnormality in the abdomen or pelvis, patient reports feeling well at this time, strict return instructions discussed, stable and appropriate for discharge      DISPOSITION: Discharge home in stable condition      FINAL IMPRESSION  1. Generalized abdominal pain    2. Non-intractable vomiting with nausea, unspecified vomiting type           This dictation was created using voice recognition software. The accuracy of the dictation is limited to the abilities of the software. I expect there may be some errors of grammar and possibly content. The nursing notes were reviewed and certain aspects of this information were incorporated into this note.    Electronically signed by: Heron Mendoza, 7/6/2017 9:31 PM

## 2017-07-07 NOTE — ED NOTES
61 y/o male ambulatory to triage with c/o abd pain that began this morning. Pt states he tried to eat today causing him to vomit which somewhat relieved the pain. Pt states his symptoms feel similar to when he had a bowel obstruction.

## 2017-07-07 NOTE — ED NOTES
Pt discharged home. Explained discharge and medication instructions. Pt verbalized understanding of instructions. Pt advised to follow-up with PCP or return to ED if symptoms worsen. Pt is ambulating well and steady on feet. VS stable. Pt's wife is at bedside and will be driving pt home.

## 2017-07-07 NOTE — ED NOTES
Pt ambulated to [place] [id number].   Chief Complaint   Patient presents with   • Abdominal Pain     Pt states, around 10am this morning he was experiecing 6/10 abd pain. Pt states he ate around 1400 and he vomited around 1700. Pt states this is the second time this has happened this past month.    • Vomiting     Pt states he seen his GI Dr yesterday and was told their was some blood in stool.   Pt changed into gown and placed on monitor.   Chart up and ready for ERP now.

## 2017-07-20 ENCOUNTER — HOSPITAL ENCOUNTER (OUTPATIENT)
Dept: LAB | Facility: MEDICAL CENTER | Age: 62
End: 2017-07-20
Attending: INTERNAL MEDICINE
Payer: OTHER GOVERNMENT

## 2017-07-20 LAB
ERYTHROCYTE [DISTWIDTH] IN BLOOD BY AUTOMATED COUNT: 43.4 FL (ref 35.9–50)
FERRITIN SERPL-MCNC: 22.6 NG/ML (ref 22–322)
HCT VFR BLD AUTO: 46.1 % (ref 42–52)
HGB BLD-MCNC: 14.4 G/DL (ref 14–18)
IRON SATN MFR SERPL: 8 % (ref 15–55)
IRON SERPL-MCNC: 34 UG/DL (ref 50–180)
MCH RBC QN AUTO: 26.2 PG (ref 27–33)
MCHC RBC AUTO-ENTMCNC: 31.2 G/DL (ref 33.7–35.3)
MCV RBC AUTO: 84 FL (ref 81.4–97.8)
PLATELET # BLD AUTO: 292 K/UL (ref 164–446)
PMV BLD AUTO: 9.6 FL (ref 9–12.9)
RBC # BLD AUTO: 5.49 M/UL (ref 4.7–6.1)
TIBC SERPL-MCNC: 421 UG/DL (ref 250–450)
WBC # BLD AUTO: 8 K/UL (ref 4.8–10.8)

## 2017-07-20 PROCEDURE — 83550 IRON BINDING TEST: CPT

## 2017-07-20 PROCEDURE — 36415 COLL VENOUS BLD VENIPUNCTURE: CPT

## 2017-07-20 PROCEDURE — 83540 ASSAY OF IRON: CPT

## 2017-07-20 PROCEDURE — 85027 COMPLETE CBC AUTOMATED: CPT

## 2017-07-20 PROCEDURE — 82728 ASSAY OF FERRITIN: CPT

## 2017-07-28 ENCOUNTER — OFFICE VISIT (OUTPATIENT)
Dept: MEDICAL GROUP | Facility: PHYSICIAN GROUP | Age: 62
End: 2017-07-28
Payer: OTHER GOVERNMENT

## 2017-07-28 VITALS
WEIGHT: 136 LBS | HEART RATE: 80 BPM | BODY MASS INDEX: 23.22 KG/M2 | TEMPERATURE: 97.8 F | RESPIRATION RATE: 18 BRPM | OXYGEN SATURATION: 94 % | HEIGHT: 64 IN | DIASTOLIC BLOOD PRESSURE: 60 MMHG | SYSTOLIC BLOOD PRESSURE: 110 MMHG

## 2017-07-28 DIAGNOSIS — E61.1 IRON DEFICIENCY: ICD-10-CM

## 2017-07-28 DIAGNOSIS — Z85.46 HISTORY OF PROSTATE CANCER: ICD-10-CM

## 2017-07-28 DIAGNOSIS — E55.9 VITAMIN D INSUFFICIENCY: ICD-10-CM

## 2017-07-28 PROCEDURE — 99213 OFFICE O/P EST LOW 20 MIN: CPT | Performed by: INTERNAL MEDICINE

## 2017-07-28 ASSESSMENT — PATIENT HEALTH QUESTIONNAIRE - PHQ9: CLINICAL INTERPRETATION OF PHQ2 SCORE: 0

## 2017-07-28 NOTE — PROGRESS NOTES
"Subjective:  Christophe is a 62 y.o. male with the following   Past Medical History   Diagnosis Date   • Cancer (CMS-HCC) 2013     prostate      Family History   Problem Relation Age of Onset   • Cancer Father      prostate, age 90   • Diabetes Neg Hx    • Heart Disease Neg Hx    • Stroke Neg Hx      The patient is on the following medications:   Current outpatient prescriptions:   •  Multiple Vitamin (ONE-A-DAY 55 PLUS PO), Take  by mouth., Disp: , Rfl:   •  ferrous sulfate 325 (65 FE) MG EC tablet, Take 1 Tab by mouth 3 times a day, with meals., Disp: 90 Tab, Rfl: 3    HPI; Patient is here today for follow-up visit regarding his recent labs, he has had history of iron deficiency anemia, status post gastroenterology consultation and colonoscopy, on ferrous sulfate plus vitamin C, denies having rectal bleeding, chest pain or shortness of breath..  ROS:  See HPI    Blood pressure 110/60, pulse 80, temperature 36.6 °C (97.8 °F), resp. rate 18, height 1.626 m (5' 4.02\"), weight 61.689 kg (136 lb), SpO2 94 %.on RA  Objective:  Patient is well appearing and in no acute distress.  Pharynx is clear.  Neck is soft and supple with no cervical or supraclavicular lymphadenopathy, thyromegaly or masses, no JVD.  Lungs clear to auscultation bilaterally with normal respiratory effort. Abdomen soft, non-tender on palpation,not distended. Heart regular rate and rhythm without murmur. Extremities without any clubbing, cyanosis, or edema.    Assessment and Plan:  1. History of iron deficiency anemia; status post colonoscopy, on ferrous sulfate 325 mg once daily, currently followed by gastroenterology.   Ref. Range 2/16/2017 08:30 7/6/2017 19:35 7/20/2017 06:08   Hemoglobin Latest Ref Range: 14.0-18.0 g/dL 14.2 14.5 14.4   Hematocrit Latest Ref Range: 42.0-52.0 % 45.4 46.6 46.1   MCV Latest Ref Range: 81.4-97.8 fL 81.9 82.8 84.0      Ref. Range 8/8/2016 10:50 2/16/2017 08:28 2/16/2017 08:30 6/17/2017 07:22 7/20/2017 06:08   Ferritin Latest " Ref Range: 22.0-322.0 ng/mL 2.9 (L) 13.3 (L) 13.7 (L) 25.6 22.6        Ref. Range 2/16/2017 08:30 6/17/2017 07:22 7/20/2017 06:08   Iron Latest Ref Range:  ug/dL 40 (L) 30 (L) 34 (L)   Total Iron Binding Latest Ref Range: 250-450 ug/dL 459 (H) 423 421   % Saturation Latest Ref Range: 15-55 % 9 (L) 7 (L) 8 (L)         2. History of prostate cancer; status post radical prostatectomy.        3. ED; per patient  started after prostatectomy, previously responded to Viagra but , prefers no medical therapy.        Patient is advised on preventive and supportive care, diet and lifestyle modification, daily walking ,exercise , ferrous sulfate 325 mg by mouth twice a day as tolerated,  monitor labs.refer to  hematology for second opinion regarding iron deficiency.       Please note that this dictation was created using voice recognition software. I have worked with consultants from the vendor as well as technical experts from [facility] [employer] to optimize the interface. I have made every reasonable attempt to correct obvious errors, but I expect that there are errors of grammar and possibly content that I did not discover before finalizing the note.

## 2017-10-16 ENCOUNTER — HOSPITAL ENCOUNTER (OUTPATIENT)
Dept: LAB | Facility: MEDICAL CENTER | Age: 62
End: 2017-10-16
Attending: UROLOGY
Payer: OTHER GOVERNMENT

## 2017-10-16 LAB — PSA SERPL-MCNC: 0.03 NG/ML (ref 0–4)

## 2017-10-16 PROCEDURE — 36415 COLL VENOUS BLD VENIPUNCTURE: CPT

## 2017-10-16 PROCEDURE — 84153 ASSAY OF PSA TOTAL: CPT

## 2017-11-13 ENCOUNTER — TELEPHONE (OUTPATIENT)
Dept: MEDICAL GROUP | Facility: PHYSICIAN GROUP | Age: 62
End: 2017-11-13

## 2017-11-13 DIAGNOSIS — Z85.46 HISTORY OF PROSTATE CANCER: ICD-10-CM

## 2017-11-13 DIAGNOSIS — C61 PROSTATE CANCER (HCC): ICD-10-CM

## 2017-11-13 NOTE — TELEPHONE ENCOUNTER
Patient's wife called stating patient has an appointment tomorrow with  and needs a new referral.    LVM informing patient that Benito will not get back to us today in regards to coverage for tomorrow's appt so he may want to r/s.    Referral pended to  to sign (this is a renewal).

## 2017-11-17 NOTE — TELEPHONE ENCOUNTER
Patient's wife called stating she has not heard back ... LVM informing her I LVM informing patient referral was done and he will be contacted for appt.

## 2018-04-24 ENCOUNTER — OFFICE VISIT (OUTPATIENT)
Dept: MEDICAL GROUP | Facility: PHYSICIAN GROUP | Age: 63
End: 2018-04-24
Payer: MEDICARE

## 2018-04-24 VITALS
WEIGHT: 139 LBS | DIASTOLIC BLOOD PRESSURE: 88 MMHG | BODY MASS INDEX: 23.73 KG/M2 | HEIGHT: 64 IN | TEMPERATURE: 98.4 F | OXYGEN SATURATION: 95 % | SYSTOLIC BLOOD PRESSURE: 140 MMHG | HEART RATE: 87 BPM

## 2018-04-24 DIAGNOSIS — R73.9 ELEVATED BLOOD SUGAR: ICD-10-CM

## 2018-04-24 DIAGNOSIS — Z00.00 WELL ADULT ON ROUTINE HEALTH CHECK: ICD-10-CM

## 2018-04-24 DIAGNOSIS — D50.9 IRON DEFICIENCY ANEMIA, UNSPECIFIED IRON DEFICIENCY ANEMIA TYPE: ICD-10-CM

## 2018-04-24 PROCEDURE — 99214 OFFICE O/P EST MOD 30 MIN: CPT | Performed by: FAMILY MEDICINE

## 2018-04-24 NOTE — ASSESSMENT & PLAN NOTE
Ongoing issue; patient reports that he was diagnosed with this issue through his GI doctor and was recommended to take supplement iron. Patient reports that he has not been compliant with taking supplemental iron.

## 2018-04-24 NOTE — ASSESSMENT & PLAN NOTE
Ongoing issue; chart review shows that was completed approximately one year ago shows a mildly elevated fasting glucose at 129. Patient had no follow-up for this issue

## 2018-04-24 NOTE — PROGRESS NOTES
"Subjective:   Christophe Rizo is a 62 y.o. male here today for elevated blood sugar, iron deficiency anemia    Elevated blood sugar  Ongoing issue; chart review shows that was completed approximately one year ago shows a mildly elevated fasting glucose at 129. Patient had no follow-up for this issue    Iron deficiency anemia  Ongoing issue; patient reports that he was diagnosed with this issue through his GI doctor and was recommended to take supplement iron. Patient reports that he has not been compliant with taking supplemental iron.     Patient is here today to establish care    Current medicines (including changes today)  Current Outpatient Prescriptions   Medication Sig Dispense Refill   • Ferrous Gluconate-C-Folic Acid (IRON-C PO) Take  by mouth.     • Multiple Vitamin (ONE-A-DAY 55 PLUS PO) Take  by mouth.       No current facility-administered medications for this visit.      He  has a past medical history of Cancer (CMS-HCC) (2013). He also has no past medical history of Hyperlipidemia; Hypertension; or Thyroid disease.    ROS   No chest pain, no shortness of breath, no abdominal pain, no headache       Objective:     Blood pressure 140/88, pulse 87, temperature 36.9 °C (98.4 °F), height 1.626 m (5' 4\"), weight 63 kg (139 lb), SpO2 95 %. Body mass index is 23.86 kg/m².   Physical Exam:  Alert, oriented in no acute distress.  Eye contact is good, speech goal directed, affect calm  HEENT: conjunctiva non-injected, sclera non-icteric.  Pinna normal. TM pearly gray.   Oral mucous membranes pink and moist with no lesions.  Neck No adenopathy or masses in the neck or supraclavicular regions.  Lungs: clear to auscultation bilaterally with good excursion.  CV: regular rate and rhythm. Mild systolic ejection murmur  Abdomen: soft, nontender, No CVAT  Ext: no edema, color normal, vascularity normal, temperature normal  Neuro: Cranial nerves II through XII grossly intact      Assessment and Plan:   The following " treatment plan was discussed     1. Well adult on routine health check  LIPID PROFILE    TSH    VITAMIN D,25 HYDROXY    No acute findings on exam; chart review note dated; age-appropriate screening labs ordered; monitor for results   2. Elevated blood sugar  COMP METABOLIC PANEL    HEMOGLOBIN A1C    Stable. Labs ordered for reevaluation; monitor for results   3. Iron deficiency anemia, unspecified iron deficiency anemia type  CBC WITH DIFFERENTIAL    Stable. Labs ordered for reevaluation; monitor for results       Followup: Return in about 1 year (around 4/24/2019) for annual physical, Short.

## 2018-04-27 ENCOUNTER — HOSPITAL ENCOUNTER (OUTPATIENT)
Dept: LAB | Facility: MEDICAL CENTER | Age: 63
End: 2018-04-27
Attending: FAMILY MEDICINE
Payer: MEDICARE

## 2018-04-27 DIAGNOSIS — R73.9 ELEVATED BLOOD SUGAR: ICD-10-CM

## 2018-04-27 DIAGNOSIS — Z00.00 WELL ADULT ON ROUTINE HEALTH CHECK: ICD-10-CM

## 2018-04-27 DIAGNOSIS — D50.9 IRON DEFICIENCY ANEMIA, UNSPECIFIED IRON DEFICIENCY ANEMIA TYPE: ICD-10-CM

## 2018-04-27 LAB
25(OH)D3 SERPL-MCNC: 25 NG/ML (ref 30–100)
ALBUMIN SERPL BCP-MCNC: 4.1 G/DL (ref 3.2–4.9)
ALBUMIN/GLOB SERPL: 1.3 G/DL
ALP SERPL-CCNC: 63 U/L (ref 30–99)
ALT SERPL-CCNC: 22 U/L (ref 2–50)
ANION GAP SERPL CALC-SCNC: 8 MMOL/L (ref 0–11.9)
ANISOCYTOSIS BLD QL SMEAR: ABNORMAL
AST SERPL-CCNC: 19 U/L (ref 12–45)
BASOPHILS # BLD AUTO: 1 % (ref 0–1.8)
BASOPHILS # BLD: 0.07 K/UL (ref 0–0.12)
BILIRUB SERPL-MCNC: 0.6 MG/DL (ref 0.1–1.5)
BUN SERPL-MCNC: 10 MG/DL (ref 8–22)
BURR CELLS BLD QL SMEAR: NORMAL
CALCIUM SERPL-MCNC: 8.4 MG/DL (ref 8.5–10.5)
CHLORIDE SERPL-SCNC: 104 MMOL/L (ref 96–112)
CHOLEST SERPL-MCNC: 151 MG/DL (ref 100–199)
CO2 SERPL-SCNC: 25 MMOL/L (ref 20–33)
COMMENT 1642: NORMAL
CREAT SERPL-MCNC: 0.74 MG/DL (ref 0.5–1.4)
EOSINOPHIL # BLD AUTO: 0.21 K/UL (ref 0–0.51)
EOSINOPHIL NFR BLD: 3.1 % (ref 0–6.9)
ERYTHROCYTE [DISTWIDTH] IN BLOOD BY AUTOMATED COUNT: 43.3 FL (ref 35.9–50)
EST. AVERAGE GLUCOSE BLD GHB EST-MCNC: 117 MG/DL
GLOBULIN SER CALC-MCNC: 3.2 G/DL (ref 1.9–3.5)
GLUCOSE SERPL-MCNC: 87 MG/DL (ref 65–99)
HBA1C MFR BLD: 5.7 % (ref 0–5.6)
HCT VFR BLD AUTO: 38.2 % (ref 42–52)
HDLC SERPL-MCNC: 46 MG/DL
HGB BLD-MCNC: 11.3 G/DL (ref 14–18)
IMM GRANULOCYTES # BLD AUTO: 0.03 K/UL (ref 0–0.11)
IMM GRANULOCYTES NFR BLD AUTO: 0.4 % (ref 0–0.9)
LDLC SERPL CALC-MCNC: 75 MG/DL
LYMPHOCYTES # BLD AUTO: 2.32 K/UL (ref 1–4.8)
LYMPHOCYTES NFR BLD: 33.9 % (ref 22–41)
MCH RBC QN AUTO: 22.3 PG (ref 27–33)
MCHC RBC AUTO-ENTMCNC: 29.6 G/DL (ref 33.7–35.3)
MCV RBC AUTO: 75.5 FL (ref 81.4–97.8)
MICROCYTES BLD QL SMEAR: ABNORMAL
MONOCYTES # BLD AUTO: 0.61 K/UL (ref 0–0.85)
MONOCYTES NFR BLD AUTO: 8.9 % (ref 0–13.4)
MORPHOLOGY BLD-IMP: NORMAL
NEUTROPHILS # BLD AUTO: 3.61 K/UL (ref 1.82–7.42)
NEUTROPHILS NFR BLD: 52.7 % (ref 44–72)
NRBC # BLD AUTO: 0 K/UL
NRBC BLD-RTO: 0 /100 WBC
OVALOCYTES BLD QL SMEAR: NORMAL
PLATELET # BLD AUTO: 376 K/UL (ref 164–446)
PLATELET BLD QL SMEAR: NORMAL
PMV BLD AUTO: 9.1 FL (ref 9–12.9)
POIKILOCYTOSIS BLD QL SMEAR: NORMAL
POTASSIUM SERPL-SCNC: 3.8 MMOL/L (ref 3.6–5.5)
PROT SERPL-MCNC: 7.3 G/DL (ref 6–8.2)
RBC # BLD AUTO: 5.06 M/UL (ref 4.7–6.1)
RBC BLD AUTO: PRESENT
SCHISTOCYTES BLD QL SMEAR: NORMAL
SODIUM SERPL-SCNC: 137 MMOL/L (ref 135–145)
TRIGL SERPL-MCNC: 150 MG/DL (ref 0–149)
TSH SERPL DL<=0.005 MIU/L-ACNC: 0.93 UIU/ML (ref 0.38–5.33)
WBC # BLD AUTO: 6.9 K/UL (ref 4.8–10.8)

## 2018-04-27 PROCEDURE — 83036 HEMOGLOBIN GLYCOSYLATED A1C: CPT

## 2018-04-27 PROCEDURE — 36415 COLL VENOUS BLD VENIPUNCTURE: CPT

## 2018-04-27 PROCEDURE — 82306 VITAMIN D 25 HYDROXY: CPT | Mod: GA

## 2018-04-27 PROCEDURE — 84443 ASSAY THYROID STIM HORMONE: CPT | Mod: GA

## 2018-04-27 PROCEDURE — 85025 COMPLETE CBC W/AUTO DIFF WBC: CPT

## 2018-04-27 PROCEDURE — 80061 LIPID PANEL: CPT | Mod: GA

## 2018-04-27 PROCEDURE — 80053 COMPREHEN METABOLIC PANEL: CPT

## 2018-11-06 ENCOUNTER — HOSPITAL ENCOUNTER (OUTPATIENT)
Dept: LAB | Facility: MEDICAL CENTER | Age: 63
End: 2018-11-06
Attending: UROLOGY
Payer: MEDICARE

## 2018-11-06 LAB — PSA SERPL-MCNC: 0.03 NG/ML (ref 0–4)

## 2018-11-06 PROCEDURE — 84153 ASSAY OF PSA TOTAL: CPT

## 2018-11-06 PROCEDURE — 36415 COLL VENOUS BLD VENIPUNCTURE: CPT

## 2019-08-15 ENCOUNTER — OFFICE VISIT (OUTPATIENT)
Dept: MEDICAL GROUP | Facility: MEDICAL CENTER | Age: 64
End: 2019-08-15
Payer: MEDICARE

## 2019-08-15 VITALS
BODY MASS INDEX: 25.03 KG/M2 | TEMPERATURE: 98.5 F | HEIGHT: 62 IN | HEART RATE: 69 BPM | RESPIRATION RATE: 14 BRPM | WEIGHT: 136 LBS | SYSTOLIC BLOOD PRESSURE: 136 MMHG | OXYGEN SATURATION: 98 % | DIASTOLIC BLOOD PRESSURE: 74 MMHG

## 2019-08-15 DIAGNOSIS — Z90.49 HISTORY OF RESECTION OF SMALL BOWEL: ICD-10-CM

## 2019-08-15 DIAGNOSIS — D50.9 IRON DEFICIENCY ANEMIA, UNSPECIFIED IRON DEFICIENCY ANEMIA TYPE: ICD-10-CM

## 2019-08-15 DIAGNOSIS — Z85.46 HISTORY OF PROSTATE CANCER: ICD-10-CM

## 2019-08-15 DIAGNOSIS — R42 EPISODIC LIGHTHEADEDNESS: ICD-10-CM

## 2019-08-15 PROCEDURE — 99214 OFFICE O/P EST MOD 30 MIN: CPT | Performed by: PHYSICIAN ASSISTANT

## 2019-08-15 RX ORDER — FERROUS SULFATE 325(65) MG
325 TABLET ORAL DAILY
COMMUNITY
End: 2023-04-14

## 2019-08-15 RX ORDER — UBIDECARENONE 75 MG
100 CAPSULE ORAL DAILY
COMMUNITY
End: 2023-03-03

## 2019-08-15 ASSESSMENT — PATIENT HEALTH QUESTIONNAIRE - PHQ9: CLINICAL INTERPRETATION OF PHQ2 SCORE: 0

## 2019-08-15 NOTE — ASSESSMENT & PLAN NOTE
Caused by small bowel [facility] in 2006[facility] Patient will bring copy. Up to date with endoscopy and colonoscopy with Dr. Rao. Taking iron supplementation off and on

## 2019-08-15 NOTE — LETTER
[facility]  Latia Payton P.A.-C.  [address]  [place] [address]  Fax: [phone]   Authorization for Release/Disclosure of   Protected Health Information   Name: STEVEN KAMARA : 1955 SSN: [id number]   Address: [address] [place] [address] [place] [address] Phone:    [phone] (home)    I authorize the entity listed below to release/disclose the PHI below to:   [facility]/Latia Payton P.A.-C. and Latia Payton P.A.-C.   Provider or Entity Name:  [facility]   Address   City, State, Zip   Phone:      Fax:     Reason for request: continuity of care   Information to be released:    [  ] LAST COLONOSCOPY,  including any PATH REPORT and follow-up  [  ] LAST FIT/COLOGUARD RESULT [  ] LAST DEXA  [  ] LAST MAMMOGRAM  [  ] LAST PAP  [  ] LAST LABS [  ] RETINA EXAM REPORT  [  ] IMMUNIZATION RECORDS  [  ] Release all info      [  ] Check here and initial the line next to each item to release ALL health information INCLUDING  _____ Care and treatment for drug and / or alcohol abuse  _____ HIV testing, infection status, or AIDS  _____ Genetic Testing    DATES OF SERVICE OR TIME PERIOD TO BE DISCLOSED: _____________  I understand and acknowledge that:  * This Authorization may be revoked at any time by you in writing, except if your health information has already been used or disclosed.  * Your health information that will be used or disclosed as a result of you signing this authorization could be re-disclosed by the recipient. If this occurs, your re-disclosed health information may no longer be protected by State or Federal laws.  * You may refuse to sign this Authorization. Your refusal will not affect your ability to obtain treatment.  * This Authorization becomes effective upon signing and will  on (date) __________.      If no date is indicated, this Authorization will  one (1) year from the signature date.    Name: Steven Kamara    Signature:   Date:          8/15/2019       PLEASE FAX REQUESTED RECORDS BACK TO: [phone]

## 2019-08-15 NOTE — ASSESSMENT & PLAN NOTE
2006, d/t blockage, Dr. Payne, no complications after other than chronic iron deficiency anemia which he treats with iron supplementation

## 2019-08-15 NOTE — LETTER
[facility]  Latia Payton P.A.-C.  [address]  [place] [address]  Fax: [phone]   Authorization for Release/Disclosure of   Protected Health Information   Name: STEVEN KAMARA : 1955 SSN: [id number]   Address: [address] [place] [address] [place] [address] Phone:    [phone] (home)    I authorize the entity listed below to release/disclose the PHI below to:   [facility]/Latia Payton P.A.-C. and Latia Payton P.A.-C.   Provider or Entity Name:  [facility]   City, State, Zip   Phone:      Fax:     Reason for request: continuity of care   Information to be released:    [  ] LAST COLONOSCOPY,  including any PATH REPORT and follow-up  [  ] LAST FIT/COLOGUARD RESULT [  ] LAST DEXA  [  ] LAST MAMMOGRAM  [  ] LAST PAP  [  ] LAST LABS [  ] RETINA EXAM REPORT  [  ] IMMUNIZATION RECORDS  [  ] Release all info      [  ] Check here and initial the line next to each item to release ALL health information INCLUDING  _____ Care and treatment for drug and / or alcohol abuse  _____ HIV testing, infection status, or AIDS  _____ Genetic Testing    DATES OF SERVICE OR TIME PERIOD TO BE DISCLOSED: _____________  I understand and acknowledge that:  * This Authorization may be revoked at any time by you in writing, except if your health information has already been used or disclosed.  * Your health information that will be used or disclosed as a result of you signing this authorization could be re-disclosed by the recipient. If this occurs, your re-disclosed health information may no longer be protected by State or Federal laws.  * You may refuse to sign this Authorization. Your refusal will not affect your ability to obtain treatment.  * This Authorization becomes effective upon signing and will  on (date) __________.      If no date is indicated, this Authorization will  one (1) year from the signature date.    Name: Steven Kamara    Signature:   Date:          8/15/2019       PLEASE FAX REQUESTED RECORDS BACK TO: [phone]

## 2019-08-15 NOTE — ASSESSMENT & PLAN NOTE
Per patient this is due to the iron deficiency when he hasn't been taking his iron pills. No recent episodes

## 2019-08-15 NOTE — PROGRESS NOTES
Chief Complaint   Patient presents with   • Establish Care     new to you       HISTORY OF THE PRESENT ILLNESS: This is a 64 y.o. male new patient to our practice. This pleasant patient is here today to establish care. Non-smoker, generally healthy, , retired . No new concerns. [facility] but likes to have a private insurance primary care as well. On no regular prescription medications. Not due for preventative care at this time.    History of resection of small bowel  , d/t blockage, Dr. Payne, no complications after other than chronic iron deficiency anemia which he treats with iron supplementation    Iron deficiency anemia  Caused by small bowel resection in . [facility]. Patient will bring copy. Up to date with endoscopy and colonoscopy with Dr. Rao. Taking iron supplementation off and on    History of prostate cancer  Radical prostatectomy, no radiation or chemo, followed yearly with Dr. Serra in [place]    Episodic lightheadedness  Per patient this is due to the iron deficiency when he hasn't been taking his iron pills. No recent episodes      Past Medical History:   Diagnosis Date   • Cancer (HCC)     prostate       Past Surgical History:   Procedure Laterality Date   • ELBOW EXPLORATION      cubital tunnel   • BOWEL RESECTION      volvulus, small intestine   • COLONOSCOPY  ,     neg   • PROSTATECTOMY, RADIAL Bilateral        Family Status   Relation Name Status   • Fa     • Mo     • Sis  Alive   • Bro  Alive   • Neg Hx  (Not Specified)     Family History   Problem Relation Age of Onset   • Cancer Father         prostate, age 90   • Stroke Mother    • Diabetes Neg Hx    • Heart Disease Neg Hx        Social History     Tobacco Use   • Smoking status: Never Smoker   • Smokeless tobacco: Never Used   • Tobacco comment: quit ,<1 pckyr   Substance Use Topics   • Alcohol use: Yes      "Comment: weekend   • Drug use: No       Allergies: Asa [aspirin]    Current Outpatient Medications Ordered in Epic   Medication Sig Dispense Refill   • cyanocobalamin (VITAMIN B-12) 100 MCG Tab Take 100 mcg by mouth every day.     • ferrous sulfate 325 (65 Fe) MG tablet Take 325 mg by mouth every day.     • vitamin D (CHOLECALCIFEROL) 1000 UNIT Tab Take 1,000 Units by mouth every day.       No current [facility]-ordered facility-administered medications on file.        Review of Systems   Constitutional: Negative for fever, chill  HENT: Negative for ear pain, nosebleeds, congestion, sore throat and neck pain.    Eyes: Negative for blurred vision.   Respiratory: Negative for cough, sputum production, shortness of breath and wheezing.    Cardiovascular: Negative for chest pain, palpitations, orthopnea and leg swelling.   Gastrointestinal: Negative for heartburn, nausea, vomiting and abdominal pain.   Genitourinary: Negative for dysuria, urgency and frequency.   Musculoskeletal: Negative for myalgias, back pain and joint pain.   Skin: Negative for rash and itching.   Neurological: Negative for dizziness, tingling, tremors, sensory change, focal weakness and headaches.   Endo/Heme/Allergies: Does not bruise/bleed easily.   Psychiatric/Behavioral: Negative for depression, anxiety, or memory loss.     All other systems reviewed and are negative except as in HPI.    Exam: /74 (BP Location: Left arm, Patient Position: Sitting, BP Cuff Size: Adult)   Pulse 69   Temp 36.9 °C (98.5 °F) (Temporal)   Resp 14   Ht 1.575 m (5' 2\")   Wt 61.7 kg (136 lb)   SpO2 98%   General: Normal appearing. No distress.  HEENT: Normocephalic. Eyes conjunctiva clear lids without ptosis, pupils equal and reactive to light accommodation, ears normal shape and contour, canals are clear bilaterally, tympanic membranes are benign, nasal mucosa benign, oropharynx is without erythema, edema or exudates.   Neck: Supple without JVD or bruit. Thyroid " is not enlarged.  Pulmonary: Clear to ausculation.  Normal effort. No rales, ronchi, or wheezing.  Cardiovascular: Regular rate and rhythm without murmur. Carotid and radial pulses are intact and equal bilaterally.  Neurologic: Grossly nonfocal  Lymph: No cervical, supraclavicular lymph nodes are palpable  Skin: Warm and dry.  No obvious lesions.  Musculoskeletal: Normal gait. No extremity cyanosis, clubbing, or edema.  Psych: Normal mood and affect. Alert and oriented x3. Judgment and insight is normal.    Assessment/Plan  1. Iron deficiency anemia, unspecified iron deficiency anemia type     2. Episodic lightheadedness     3. History of resection of small bowel     4. History of prostate cancer       F/u yearly and as needed

## 2019-11-06 ENCOUNTER — HOSPITAL ENCOUNTER (OUTPATIENT)
Dept: LAB | Facility: MEDICAL CENTER | Age: 64
End: 2019-11-06
Attending: UROLOGY
Payer: MEDICARE

## 2019-11-06 LAB — PSA SERPL-MCNC: 0.03 NG/ML (ref 0–4)

## 2019-11-06 PROCEDURE — 84153 ASSAY OF PSA TOTAL: CPT

## 2019-11-06 PROCEDURE — 36415 COLL VENOUS BLD VENIPUNCTURE: CPT

## 2020-05-22 ENCOUNTER — OFFICE VISIT (OUTPATIENT)
Dept: MEDICAL GROUP | Facility: MEDICAL CENTER | Age: 65
End: 2020-05-22
Payer: MEDICARE

## 2020-05-22 VITALS
TEMPERATURE: 97.7 F | SYSTOLIC BLOOD PRESSURE: 146 MMHG | HEART RATE: 66 BPM | OXYGEN SATURATION: 98 % | BODY MASS INDEX: 24.99 KG/M2 | DIASTOLIC BLOOD PRESSURE: 84 MMHG | HEIGHT: 62 IN | WEIGHT: 135.8 LBS

## 2020-05-22 DIAGNOSIS — Z00.00 WELLNESS EXAMINATION: ICD-10-CM

## 2020-05-22 DIAGNOSIS — M25.50 MULTIPLE JOINT PAIN: ICD-10-CM

## 2020-05-22 DIAGNOSIS — I10 ESSENTIAL HYPERTENSION: ICD-10-CM

## 2020-05-22 DIAGNOSIS — Z23 NEED FOR VACCINATION: ICD-10-CM

## 2020-05-22 DIAGNOSIS — Z85.46 HISTORY OF PROSTATE CANCER: ICD-10-CM

## 2020-05-22 PROCEDURE — 99214 OFFICE O/P EST MOD 30 MIN: CPT | Mod: 25 | Performed by: PHYSICIAN ASSISTANT

## 2020-05-22 PROCEDURE — 90670 PCV13 VACCINE IM: CPT | Performed by: PHYSICIAN ASSISTANT

## 2020-05-22 PROCEDURE — G0009 ADMIN PNEUMOCOCCAL VACCINE: HCPCS | Performed by: PHYSICIAN ASSISTANT

## 2020-05-22 RX ORDER — MELOXICAM 7.5 MG/1
7.5 TABLET ORAL DAILY
Qty: 30 TAB | Refills: 2 | Status: SHIPPED | OUTPATIENT
Start: 2020-05-22 | End: 2020-09-02 | Stop reason: SDUPTHER

## 2020-05-22 RX ORDER — AMLODIPINE BESYLATE 2.5 MG/1
2.5 TABLET ORAL DAILY
Qty: 90 TAB | Refills: 3 | Status: SHIPPED | OUTPATIENT
Start: 2020-05-22 | End: 2021-05-14 | Stop reason: SDUPTHER

## 2020-05-22 RX ORDER — MULTIVIT WITH MINERALS/LUTEIN
TABLET ORAL
COMMUNITY

## 2020-05-22 RX ORDER — AMLODIPINE BESYLATE 2.5 MG/1
2.5 TABLET ORAL DAILY
COMMUNITY
End: 2020-05-22 | Stop reason: SDUPTHER

## 2020-05-22 ASSESSMENT — PATIENT HEALTH QUESTIONNAIRE - PHQ9: CLINICAL INTERPRETATION OF PHQ2 SCORE: 0

## 2020-05-22 NOTE — ASSESSMENT & PLAN NOTE
Knees, shoulders, about a year. Not stiff. Pain in the mornings, better throughout the day. No swelling or redness. No meds tried. Back hurts currently because he was doing floor work all week at home.

## 2020-05-22 NOTE — PROGRESS NOTES
"Subjective:   Christophe Rizo is a 65 y.o. male here today for follow up on chronic conditions    Essential hypertension  Chronic, stable on current amlodipine, cont current    Multiple joint pain  Knees, shoulders, about a year. Not stiff. Pain in the mornings, better throughout the day. No swelling or redness. No meds tried. Back hurts currently because he was doing floor work all week at home.    History of prostate cancer  Chronic, stable, managed with Dr. Serra       Current medicines (including changes today)  Current Outpatient Medications   Medication Sig Dispense Refill   • Ascorbic Acid (VITAMIN C) 1000 MG Tab Take  by mouth.     • amLODIPine (NORVASC) 2.5 MG Tab Take 1 Tab by mouth every day. 90 Tab 3   • meloxicam (MOBIC) 7.5 MG Tab Take 1 Tab by mouth every day. 30 Tab 2   • cyanocobalamin (VITAMIN B-12) 100 MCG Tab Take 100 mcg by mouth every day.     • ferrous sulfate 325 (65 Fe) MG tablet Take 325 mg by mouth every day.     • vitamin D (CHOLECALCIFEROL) 1000 UNIT Tab Take 1,000 Units by mouth every day.       No current facility-administered medications for this visit.      He  has a past medical history of Cancer (HCC) (2013) and Essential hypertension (5/22/2020). He also has no past medical history of Hyperlipidemia or Thyroid disease.    ROS   No fever/chills. No weight change. No headache/dizziness. No focal weakness. No sore throat, nasal congestion, ear pain. No chest pain, no shortness of breath, difficulty breathing. No n/v/d/c or abdominal pain. No urinary complaint. No rash or skin lesion.      Objective:     /84 (BP Location: Left arm, Patient Position: Sitting, BP Cuff Size: Adult)   Pulse 66   Temp 36.5 °C (97.7 °F) (Temporal)   Ht 1.575 m (5' 2\")   Wt 61.6 kg (135 lb 12.9 oz)   SpO2 98%  Body mass index is 24.84 kg/m².   Physical Exam:  Constitutional: WDWN, NAD  Skin: Warm, dry, good turgor, no rashes in visible areas.  Eye: Equal, round and reactive, conjunctiva " clear, lids normal.  Respiratory: Unlabored respiratory effort, lungs clear to auscultation, no wheezes, no ronchi.  Cardiovascular: Normal S1, S2, no murmur, no leg edema.  Psych: Alert and oriented x3, normal affect and mood.      Assessment and Plan:   The following treatment plan was discussed    1. Wellness examination      2. Need for vaccination    - Prevnar-13 Vaccine (PCV13)    3. Essential hypertension    - amLODIPine (NORVASC) 2.5 MG Tab; Take 1 Tab by mouth every day.  Dispense: 90 Tab; Refill: 3    4. Multiple joint pain    - meloxicam (MOBIC) 7.5 MG Tab; Take 1 Tab by mouth every day.  Dispense: 30 Tab; Refill: 2    5. History of prostate cancer        Followup: as needed

## 2020-08-26 ENCOUNTER — HOSPITAL ENCOUNTER (OUTPATIENT)
Dept: LAB | Facility: MEDICAL CENTER | Age: 65
End: 2020-08-26
Attending: PHYSICIAN ASSISTANT
Payer: MEDICARE

## 2020-08-26 ENCOUNTER — OFFICE VISIT (OUTPATIENT)
Dept: MEDICAL GROUP | Facility: MEDICAL CENTER | Age: 65
End: 2020-08-26
Payer: MEDICARE

## 2020-08-26 VITALS
SYSTOLIC BLOOD PRESSURE: 146 MMHG | TEMPERATURE: 97.2 F | BODY MASS INDEX: 24.71 KG/M2 | OXYGEN SATURATION: 96 % | HEIGHT: 62 IN | WEIGHT: 134.26 LBS | HEART RATE: 70 BPM | DIASTOLIC BLOOD PRESSURE: 74 MMHG

## 2020-08-26 DIAGNOSIS — R41.3 MEMORY LOSS: ICD-10-CM

## 2020-08-26 LAB
ALBUMIN SERPL BCP-MCNC: 4.1 G/DL (ref 3.2–4.9)
ALBUMIN/GLOB SERPL: 1.5 G/DL
ALP SERPL-CCNC: 69 U/L (ref 30–99)
ALT SERPL-CCNC: 25 U/L (ref 2–50)
ANION GAP SERPL CALC-SCNC: 14 MMOL/L (ref 7–16)
AST SERPL-CCNC: 22 U/L (ref 12–45)
BASOPHILS # BLD AUTO: 1 % (ref 0–1.8)
BASOPHILS # BLD: 0.06 K/UL (ref 0–0.12)
BILIRUB SERPL-MCNC: 0.3 MG/DL (ref 0.1–1.5)
BUN SERPL-MCNC: 14 MG/DL (ref 8–22)
CALCIUM SERPL-MCNC: 8.8 MG/DL (ref 8.5–10.5)
CHLORIDE SERPL-SCNC: 105 MMOL/L (ref 96–112)
CO2 SERPL-SCNC: 21 MMOL/L (ref 20–33)
CREAT SERPL-MCNC: 0.88 MG/DL (ref 0.5–1.4)
EOSINOPHIL # BLD AUTO: 0.16 K/UL (ref 0–0.51)
EOSINOPHIL NFR BLD: 2.7 % (ref 0–6.9)
ERYTHROCYTE [DISTWIDTH] IN BLOOD BY AUTOMATED COUNT: 50.7 FL (ref 35.9–50)
GLOBULIN SER CALC-MCNC: 2.8 G/DL (ref 1.9–3.5)
GLUCOSE SERPL-MCNC: 130 MG/DL (ref 65–99)
HCT VFR BLD AUTO: 37.9 % (ref 42–52)
HGB BLD-MCNC: 11.5 G/DL (ref 14–18)
IMM GRANULOCYTES # BLD AUTO: 0.01 K/UL (ref 0–0.11)
IMM GRANULOCYTES NFR BLD AUTO: 0.2 % (ref 0–0.9)
LYMPHOCYTES # BLD AUTO: 1.66 K/UL (ref 1–4.8)
LYMPHOCYTES NFR BLD: 27.8 % (ref 22–41)
MCH RBC QN AUTO: 23.7 PG (ref 27–33)
MCHC RBC AUTO-ENTMCNC: 30.3 G/DL (ref 33.7–35.3)
MCV RBC AUTO: 78.1 FL (ref 81.4–97.8)
MONOCYTES # BLD AUTO: 0.48 K/UL (ref 0–0.85)
MONOCYTES NFR BLD AUTO: 8 % (ref 0–13.4)
NEUTROPHILS # BLD AUTO: 3.6 K/UL (ref 1.82–7.42)
NEUTROPHILS NFR BLD: 60.3 % (ref 44–72)
NRBC # BLD AUTO: 0 K/UL
NRBC BLD-RTO: 0 /100 WBC
PLATELET # BLD AUTO: 320 K/UL (ref 164–446)
PMV BLD AUTO: 9.4 FL (ref 9–12.9)
POTASSIUM SERPL-SCNC: 3.8 MMOL/L (ref 3.6–5.5)
PROT SERPL-MCNC: 6.9 G/DL (ref 6–8.2)
RBC # BLD AUTO: 4.85 M/UL (ref 4.7–6.1)
SODIUM SERPL-SCNC: 140 MMOL/L (ref 135–145)
WBC # BLD AUTO: 6 K/UL (ref 4.8–10.8)

## 2020-08-26 PROCEDURE — 82607 VITAMIN B-12: CPT

## 2020-08-26 PROCEDURE — 99214 OFFICE O/P EST MOD 30 MIN: CPT | Performed by: PHYSICIAN ASSISTANT

## 2020-08-26 PROCEDURE — 86780 TREPONEMA PALLIDUM: CPT | Mod: GA

## 2020-08-26 PROCEDURE — 80053 COMPREHEN METABOLIC PANEL: CPT

## 2020-08-26 PROCEDURE — 36415 COLL VENOUS BLD VENIPUNCTURE: CPT

## 2020-08-26 PROCEDURE — 84443 ASSAY THYROID STIM HORMONE: CPT

## 2020-08-26 PROCEDURE — 85025 COMPLETE CBC W/AUTO DIFF WBC: CPT

## 2020-08-26 NOTE — ASSESSMENT & PLAN NOTE
Maybe 6 months to a year. Knows he is forgetting where he puts things. Trouble remembering things that happened a few days up to a week ago. Not normal for him. Trouble remembering names, ex: bands of the 80s. Trouble remembering names of people he knows. Not losing things. Maternal grandfather had dementia, not sure of the kind. Trouble finding words during a conversation. Some trouble forgetting where things are when he is driving, so far hasn't gotten lost. No noticeable personality changes. Normally sleeping well. Not history of alcohol use. No history of tobacco smoking. No substance use. No history of major head injury. No LOC. No h/o seizures. No h/o strokes. No h/o meningitis or encephalopathy. No physical changes, weakness, sensory changes. Is having some arthritis in his hands. Slight decrease in vision, feels he needs new prescription, goes yearly, due in November. No headaches or dizziness. No history of vitamin deficiency although he has been told to take vitamin b12 in the past but stopped because it bothered his stomach. No shuffling gait. No change in handwriting. No auditory, visual hallucinations. Wife has noticed the memory changes. Denies anxiety or depression.

## 2020-08-26 NOTE — PATIENT INSTRUCTIONS
Dementia  Dementia is a condition that affects the way the brain functions. It often affects memory and thinking. Usually, dementia gets worse with time and cannot be reversed (progressive dementia). There are many types of dementia, including:  · Alzheimer's disease. This type is the most common.  · Vascular dementia. This type may happen as the result of a stroke.  · Lewy body dementia. This type may happen to people who have Parkinson's disease.  · Frontotemporal dementia. This type is caused by damage to nerve cells (neurons) in certain parts of the brain.  Some people may be affected by more than one type of dementia. This is called mixed dementia.  What are the causes?  Dementia is caused by damage to cells in the brain. The area of the brain and the types of cells damaged determine the type of dementia. Usually, this damage is irreversible or cannot be undone. Some examples of irreversible causes include:  · Conditions that affect the blood vessels of the brain, such as diabetes, heart disease, or blood vessel disease.  · Genetic mutations.  In some cases, changes in the brain may be caused by another condition and can be reversed or slowed. Some examples of reversible causes include:  · Injury to the brain.  · Certain medicines.  · Infection, such as meningitis.  · Metabolic problems, such as vitamin B12 deficiency or thyroid disease.  · Pressure on the brain, such as from a tumor or blood clot.  What are the signs or symptoms?  Symptoms of dementia depend on the type of dementia. Common signs of dementia include problems with remembering, thinking, problem solving, decision making, and communicating. These signs develop slowly or get worse with time. This may include:  · Problems remembering things.  · Having trouble taking a bath or putting clothes on.  · Forgetting appointments.  · Forgetting to pay bills.  · Difficulty planning and preparing meals.  · Having trouble speaking.  · Getting lost easily.  How  is this diagnosed?  This condition is diagnosed by a specialist (neurologist). It is diagnosed based on the history of your symptoms, your medical history, a physical exam, and tests. Tests may include:  · Tests to evaluate brain function, such as memory tests, cognitive tests, and other tests.  · Lab tests, such as blood or urine tests.  · Imaging tests, such as a CT scan, a PET scan, or an MRI.  · Genetic testing. This may be done if other family members have a diagnosis of certain types of dementia.  Your health care provider will talk with you and your family, friends, or caregivers about your history and symptoms.  How is this treated?    Treatment for this condition depends on the cause of the dementia. Progressive dementias, such as Alzheimer's disease, cannot be cured, but there may be treatments that help to manage symptoms.  Treatment might involve taking medicines that may help to:  · Control the dementia.  · Slow down the progression of the dementia.  · Manage symptoms.  In some cases, treating the cause of your dementia can improve symptoms, reverse symptoms, or slow down how quickly your dementia becomes worse.  Your health care provider can direct you to support groups, organizations, and other health care providers who can help with decisions about your care.  Follow these instructions at home:  Medicines  · Take over-the-counter and prescription medicines only as told by your health care provider.  · Use a pill organizer or pill reminder to help you manage your medicines.  · Avoid taking medicines that can affect thinking, such as pain medicines or sleeping medicines.  Lifestyle  · Make healthy lifestyle choices.  ? Be physically active as told by your health care provider.  ? Do not use any products that contain nicotine or tobacco, such as cigarettes, e-cigarettes, and chewing tobacco. If you need help quitting, ask your health care provider.  ? Do not drink alcohol.  ? Practice stress-management  techniques when you get stressed.  ? Spend time with other people.  · Make sure to get quality sleep. These tips can help you get a good night's rest:  ? Avoid napping during the day.  ? Keep your sleeping area dark and cool.  ? Avoid exercising during the few hours before you go to bed.  ? Avoid caffeine products in the evening.  Eating and drinking  · Drink enough fluid to keep your urine pale yellow.  · Eat a healthy diet.  General instructions    · Work with your health care provider to determine what you need help with and what your safety needs are.  · Talk with your health care provider about whether it is safe for you to drive.  · If you were given a bracelet that identifies you as a person with memory loss or tracks your location, make sure to wear it at all times.  · Work with your family to make important decisions, such as advance directives, medical power of , or a living will.  · Keep all follow-up visits as told by your health care provider. This is important.  Where to find more information  · Alzheimer's Association: www.alz.org  · National [place] on Aging: www.wm.nih.gov/alzheimers  · World Health Organization: www.who.int  Contact a health care provider if:  · You have any new or worsening symptoms.  · You have problems with choking or swallowing.  Get help right away if:  · You feel depressed or sad, or feel that you want to harm yourself.  · Your family members become concerned for your safety.  If you ever feel like you may hurt yourself or others, or have thoughts about taking your own life, get help right away. You can go to your nearest emergency department or call:  · Your local emergency services (911 in the [place]  · A suicide crisis helpline, such as the National Suicide Prevention Lifeline at 1-903.374.6843. This is open 24 hours a day.  Summary  · Dementia is a condition that affects the way the brain functions. Dementia often affects memory and thinking.  · Usually,  dementia gets worse with time and cannot be reversed (progressive dementia).  · Treatment for this condition depends on the cause of the dementia.  · Work with your health care provider to determine what you need help with and what your safety needs are.  · Your health care provider can direct you to support groups, organizations, and other health care providers who can help with decisions about your care.  This information is not intended to replace advice given to you by your health care provider. Make sure you discuss any questions you have with your health care provider.  Document Released: 06/13/2002 Document Revised: 03/03/2020 Document Reviewed: 03/03/2020  Elsevier Patient Education © 2020 [employer]

## 2020-08-26 NOTE — PROGRESS NOTES
Subjective:   Christophe Rizo is a 65 y.o. male here today for new concern with memory loss. No recent illness or injury. Here by himself today.    Memory loss  Maybe 6 months to a year. Knows he is forgetting where he puts things. Trouble remembering things that happened a few days up to a week ago. Not normal for him. Trouble remembering names, ex: bands of the 80s. Trouble remembering names of people he knows. Not losing things. Maternal grandfather had dementia, not sure of the kind. Trouble finding words during a conversation. Some trouble forgetting where things are when he is driving, so far hasn't gotten lost. No noticeable personality changes. Normally sleeping well. Not history of alcohol use. No history of tobacco smoking. No substance use. No history of major head injury. No LOC. No h/o seizures. No h/o strokes. No h/o meningitis or encephalopathy. No physical changes, weakness, sensory changes. Is having some arthritis in his hands. Slight decrease in vision, feels he needs new prescription, goes yearly, due in November. No headaches or dizziness. No history of vitamin deficiency although he has been told to take vitamin b12 in the past but stopped because it bothered his stomach. No shuffling gait. No change in handwriting. No auditory, visual hallucinations. Wife has noticed the memory changes. Denies anxiety or depression.      Would be interested in medication to help if this was advised    Current medicines (including changes today)  Current Outpatient Medications   Medication Sig Dispense Refill   • amLODIPine (NORVASC) 2.5 MG Tab Take 1 Tab by mouth every day. 90 Tab 3   • meloxicam (MOBIC) 7.5 MG Tab Take 1 Tab by mouth every day. 30 Tab 2   • ferrous sulfate 325 (65 Fe) MG tablet Take 325 mg by mouth every day.     • vitamin D (CHOLECALCIFEROL) 1000 UNIT Tab Take 1,000 Units by mouth every day.     • Ascorbic Acid (VITAMIN C) 1000 MG Tab Take  by mouth.     • cyanocobalamin (VITAMIN B-12)  "100 MCG Tab Take 100 mcg by mouth every day.       No current facility-administered medications for this visit.      He  has a past medical history of Cancer (HCC) (2013) and Essential hypertension (5/22/2020). He also has no past medical history of Hyperlipidemia or Thyroid disease.    ROS   No fever/chills. No weight change. No headache/dizziness. No focal weakness. No sore throat, nasal congestion, ear pain. No chest pain, no shortness of breath, difficulty breathing. No n/v/d/c or abdominal pain. No urinary complaint. No rash or skin lesion. No joint pain or swelling.       Objective:     /74 (BP Location: Left arm, Patient Position: Sitting, BP Cuff Size: Adult)   Pulse 70   Temp 36.2 °C (97.2 °F) (Temporal)   Ht 1.575 m (5' 2\")   Wt 60.9 kg (134 lb 4.2 oz)   SpO2 96%  Body mass index is 24.56 kg/m².   Physical Exam:  Constitutional: WDWN, NAD  Skin: Warm, dry, good turgor, no rashes in visible areas.  Normal gait  Psych: Alert and oriented x3, normal affect and mood.        Assessment and Plan:   The following treatment plan was discussed    1. Memory loss    - CBC WITH DIFFERENTIAL; Future  - VITAMIN B12; Future  - Comp Metabolic Panel; Future  - T.PALLIDUM AB EIA; Future  - TSH WITH REFLEX TO FT4; Future      Followup: 2 weeks with wife. Plan on cognitive function testing. Consider imaging. Consider neuro referral.         "

## 2020-08-27 LAB
TREPONEMA PALLIDUM IGG+IGM AB [PRESENCE] IN SERUM OR PLASMA BY IMMUNOASSAY: NORMAL
TSH SERPL DL<=0.005 MIU/L-ACNC: 1.2 UIU/ML (ref 0.38–5.33)
VIT B12 SERPL-MCNC: 474 PG/ML (ref 211–911)

## 2020-09-02 ENCOUNTER — OFFICE VISIT (OUTPATIENT)
Dept: MEDICAL GROUP | Facility: MEDICAL CENTER | Age: 65
End: 2020-09-02
Payer: MEDICARE

## 2020-09-02 VITALS
WEIGHT: 136.91 LBS | HEIGHT: 62 IN | TEMPERATURE: 97.9 F | SYSTOLIC BLOOD PRESSURE: 132 MMHG | BODY MASS INDEX: 25.19 KG/M2 | HEART RATE: 67 BPM | DIASTOLIC BLOOD PRESSURE: 68 MMHG | OXYGEN SATURATION: 97 %

## 2020-09-02 DIAGNOSIS — R41.3 MEMORY LOSS: ICD-10-CM

## 2020-09-02 DIAGNOSIS — M25.50 MULTIPLE JOINT PAIN: ICD-10-CM

## 2020-09-02 PROCEDURE — 99213 OFFICE O/P EST LOW 20 MIN: CPT | Performed by: PHYSICIAN ASSISTANT

## 2020-09-02 RX ORDER — MELOXICAM 7.5 MG/1
7.5 TABLET ORAL DAILY
Qty: 90 TAB | Refills: 1 | Status: SHIPPED | OUTPATIENT
Start: 2020-09-02 | End: 2021-10-12

## 2020-09-02 ASSESSMENT — FIBROSIS 4 INDEX: FIB4 SCORE: 0.92

## 2020-09-04 NOTE — ASSESSMENT & PLAN NOTE
Labs reviewed including tsh, b12, syphilis, cmp - anemia is stable. Patient continues to have problems with short term memory. No headaches, vision change, mood change, weakness, change in gait, change in handwriting. Would like to see neurology to continue evaluation - would be interested in medication such as aricept if indicated.

## 2020-09-04 NOTE — PROGRESS NOTES
"Subjective:   Christophe Rizo is a 65 y.o. male here today for f/u memory loss, lab review    Multiple joint pain  Chronic, requests refill of mobic    Memory loss  Labs reviewed including tsh, b12, syphilis, cmp - anemia is stable. Patient continues to have problems with short term memory. No headaches, vision change, mood change, weakness, change in gait, change in handwriting. Would like to see neurology to continue evaluation - would be interested in medication such as aricept if indicated.       Current medicines (including changes today)  Current Outpatient Medications   Medication Sig Dispense Refill   • meloxicam (MOBIC) 7.5 MG Tab Take 1 Tab by mouth every day. 90 Tab 1   • Ascorbic Acid (VITAMIN C) 1000 MG Tab Take  by mouth.     • amLODIPine (NORVASC) 2.5 MG Tab Take 1 Tab by mouth every day. 90 Tab 3   • cyanocobalamin (VITAMIN B-12) 100 MCG Tab Take 100 mcg by mouth every day.     • ferrous sulfate 325 (65 Fe) MG tablet Take 325 mg by mouth every day.     • vitamin D (CHOLECALCIFEROL) 1000 UNIT Tab Take 1,000 Units by mouth every day.       No current facility-administered medications for this visit.      He  has a past medical history of Cancer (HCC) (2013) and Essential hypertension (5/22/2020). He also has no past medical history of Hyperlipidemia or Thyroid disease.    ROS   No fever/chills. No weight change. No headache/dizziness. No focal weakness. No sore throat, nasal congestion, ear pain. No chest pain, no shortness of breath, difficulty breathing. No n/v/d/c or abdominal pain. No urinary complaint. No rash or skin lesion. No joint pain or swelling.       Objective:     /68 (BP Location: Left arm, Patient Position: Sitting, BP Cuff Size: Adult)   Pulse 67   Temp 36.6 °C (97.9 °F) (Temporal)   Ht 1.575 m (5' 2\")   Wt 62.1 kg (136 lb 14.5 oz)   SpO2 97%  Body mass index is 25.04 kg/m².   Physical Exam:  Constitutional: WDWN, NAD  Skin: Warm, dry, good turgor, no rashes in visible " areas.  Psych: Alert and oriented x3, normal affect and mood.    Assessment and Plan:   The following treatment plan was discussed    1. Memory loss    - REFERRAL TO NEUROLOGY    2. Multiple joint pain    - meloxicam (MOBIC) 7.5 MG Tab; Take 1 Tab by mouth every day.  Dispense: 90 Tab; Refill: 1      Followup: after neuro eval

## 2020-11-10 ENCOUNTER — HOSPITAL ENCOUNTER (OUTPATIENT)
Dept: LAB | Facility: MEDICAL CENTER | Age: 65
End: 2020-11-10
Attending: UROLOGY
Payer: MEDICARE

## 2020-11-10 LAB — PSA SERPL-MCNC: 0.04 NG/ML (ref 0–4)

## 2020-11-10 PROCEDURE — 36415 COLL VENOUS BLD VENIPUNCTURE: CPT

## 2020-11-10 PROCEDURE — 84153 ASSAY OF PSA TOTAL: CPT

## 2021-03-03 DIAGNOSIS — Z23 NEED FOR VACCINATION: ICD-10-CM

## 2021-03-10 ENCOUNTER — OFFICE VISIT (OUTPATIENT)
Dept: MEDICAL GROUP | Facility: MEDICAL CENTER | Age: 66
End: 2021-03-10
Payer: MEDICARE

## 2021-03-10 VITALS
DIASTOLIC BLOOD PRESSURE: 70 MMHG | SYSTOLIC BLOOD PRESSURE: 132 MMHG | BODY MASS INDEX: 25.76 KG/M2 | HEART RATE: 77 BPM | OXYGEN SATURATION: 97 % | WEIGHT: 140 LBS | TEMPERATURE: 98.2 F | HEIGHT: 62 IN

## 2021-03-10 DIAGNOSIS — R41.3 MEMORY LOSS: ICD-10-CM

## 2021-03-10 DIAGNOSIS — J37.0 CHRONIC LARYNGITIS: ICD-10-CM

## 2021-03-10 DIAGNOSIS — R09.89 THROAT CLEARING: ICD-10-CM

## 2021-03-10 PROBLEM — R05.9 COUGH: Status: ACTIVE | Noted: 2021-03-10

## 2021-03-10 PROBLEM — R05.9 COUGH: Status: RESOLVED | Noted: 2021-03-10 | Resolved: 2021-03-10

## 2021-03-10 PROCEDURE — 99214 OFFICE O/P EST MOD 30 MIN: CPT | Performed by: PHYSICIAN ASSISTANT

## 2021-03-10 RX ORDER — DOXYCYCLINE HYCLATE 100 MG
100 TABLET ORAL 2 TIMES DAILY
Qty: 14 TABLET | Refills: 0 | Status: SHIPPED | OUTPATIENT
Start: 2021-03-10 | End: 2021-03-17

## 2021-03-10 ASSESSMENT — PATIENT HEALTH QUESTIONNAIRE - PHQ9: CLINICAL INTERPRETATION OF PHQ2 SCORE: 0

## 2021-03-10 ASSESSMENT — FIBROSIS 4 INDEX: FIB4 SCORE: 0.92

## 2021-03-10 NOTE — PROGRESS NOTES
Subjective:      Christophe Rizo is a 65 y.o. male who presents with Cough (start during winter every year, ends around May, requesting script Abx)          Chief Complaint   Patient presents with   • Cough     start during winter every year, ends around May, requesting script Abx       HPIPatient presents with c/o chronic throat clearing/hoarse voice/feeling of mucus in throat that causes mild cough. States it happens every year from December to May. States it is better when he is in [place]. Worse in the cold. Wants to try and antibiotic. Hasn't had prior evaluation for this issue.  Tried guaifenesin which didn't help. Hot water helps a little.     ROSno weight change. No fever/chills. No headache/dizziness. No sinus pain or pressure. No nasal congestion. No ear pain. No sore throat. Mild cough but no SOB, difficulty breathing, tightness, wheezing. No chest pain, orthopnea, leg swelling. No heartburn or feeling or reflux. No rash or skin lesion.    Active Ambulatory Problems     Diagnosis Date Noted   • History of prostate cancer 03/12/2015   • Episodic lightheadedness 12/11/2015   • Iron deficiency anemia 12/16/2015   • History of resection of small bowel 08/15/2019   • Essential hypertension 05/22/2020   • Multiple joint pain 05/22/2020   • Memory loss 08/26/2020     Resolved Ambulatory Problems     Diagnosis Date Noted   • Cancer ([facility])    • SBO (small bowel obstruction) ([facility]) 11/06/2014   • Hypokalemia 11/07/2014   • Right shoulder pain 03/12/2015   • Weight loss 03/12/2015   • Midline low back pain without sciatica 12/11/2015   • Elevated blood sugar 12/11/2015   • Acute bronchitis 02/02/2016   • Acid reflux 07/25/2016   • Cough 03/10/2021     No Additional Past Medical History     Current Outpatient Medications   Medication Sig Dispense Refill   • Apoaequorin (PREVAGEN EXTRA STRENGTH) 20 MG Cap Take 1 capsule by mouth every day. 90 capsule 3   • doxycycline (VIBRAMYCIN) 100 MG Tab Take 1 tablet  "by mouth 2 times a day for 7 days. 14 tablet 0   • meloxicam (MOBIC) 7.5 MG Tab Take 1 Tab by mouth every day. 90 Tab 1   • Ascorbic Acid (VITAMIN C) 1000 MG Tab Take  by mouth.     • amLODIPine (NORVASC) 2.5 MG Tab Take 1 Tab by mouth every day. 90 Tab 3   • cyanocobalamin (VITAMIN B-12) 100 MCG Tab Take 100 mcg by mouth every day.     • ferrous sulfate 325 (65 Fe) MG tablet Take 325 mg by mouth every day.     • vitamin D (CHOLECALCIFEROL) 1000 UNIT Tab Take 1,000 Units by mouth every day.       No current facility-administered medications for this visit.   allergy to aspirin  Non-smoker   Objective:     /70 (BP Location: Left arm, Patient Position: Sitting, BP Cuff Size: Adult)   Pulse 77   Temp 36.8 °C (98.2 °F) (Temporal)   Ht 1.575 m (5' 2\")   Wt 63.5 kg (140 lb)   SpO2 97%   BMI 25.61 kg/m²      Physical Exam  Vitals and nursing note reviewed.   Constitutional:       General: He is not in acute distress.     Appearance: Normal appearance. He is normal weight. He is not ill-appearing, toxic-appearing or diaphoretic.   Eyes:      Conjunctiva/sclera: Conjunctivae normal.   Cardiovascular:      Rate and Rhythm: Normal rate and regular rhythm.   Pulmonary:      Effort: Pulmonary effort is normal. No respiratory distress.      Breath sounds: Normal breath sounds. No stridor. No wheezing, rhonchi or rales.   Musculoskeletal:      Cervical back: Normal range of motion. No rigidity or tenderness.   Lymphadenopathy:      Cervical: No cervical adenopathy.   Skin:     General: Skin is warm and dry.      Coloration: Skin is not pale.      Findings: No rash.   Neurological:      General: No focal deficit present.      Mental Status: He is alert and oriented to person, place, and time.   Psychiatric:         Mood and Affect: Mood normal.         Behavior: Behavior normal.         Thought Content: Thought content normal.         Judgment: Judgment normal.                 Assessment/Plan:        1. Chronic " laryngitis    - doxycycline (VIBRAMYCIN) 100 MG Tab; Take 1 tablet by mouth 2 times a day for 7 days.  Dispense: 14 tablet; Refill: 0    2. Memory loss  Patient has read/heard good things about the vitamin/supplement and requests to have prescription  - Apoaequorin (PREVAGEN EXTRA STRENGTH) 20 MG Cap; Take 1 capsule by mouth every day.  Dispense: 90 capsule; Refill: 3    3. Throat clearing    F/u with ENT if no improvement.

## 2021-03-31 ENCOUNTER — OFFICE VISIT (OUTPATIENT)
Dept: NEUROLOGY | Facility: MEDICAL CENTER | Age: 66
End: 2021-03-31
Attending: PSYCHIATRY & NEUROLOGY
Payer: MEDICARE

## 2021-03-31 VITALS
DIASTOLIC BLOOD PRESSURE: 78 MMHG | WEIGHT: 146 LBS | HEIGHT: 64 IN | BODY MASS INDEX: 24.92 KG/M2 | TEMPERATURE: 99 F | SYSTOLIC BLOOD PRESSURE: 144 MMHG | OXYGEN SATURATION: 98 % | HEART RATE: 91 BPM

## 2021-03-31 DIAGNOSIS — G31.84 MILD COGNITIVE IMPAIRMENT, SO STATED: ICD-10-CM

## 2021-03-31 PROCEDURE — 99211 OFF/OP EST MAY X REQ PHY/QHP: CPT | Performed by: PSYCHIATRY & NEUROLOGY

## 2021-03-31 PROCEDURE — 99204 OFFICE O/P NEW MOD 45 MIN: CPT | Performed by: PSYCHIATRY & NEUROLOGY

## 2021-03-31 ASSESSMENT — FIBROSIS 4 INDEX: FIB4 SCORE: 0.92

## 2021-03-31 NOTE — PROGRESS NOTES
Reason for Neurology Consult:  Concern for memory issues     History of present illness:    Christophe Suhbenoit 65 y.o. right [facility] with HTN  who graduated with a bachelor of fine arts in the [employer] and moved to [place] in 1976. He was a  and in 1982 joined the [employer] and left the [employer] in 2004.    In the recent months, he has not been taking his BP mediation(s) yet he has not been consistently checking his BP over these recent months.    He has noticed for the last 4 to 6 months that he will periodically look for an object anywhere in his house and once he gets to that location will think  Why he is going there or sometimes a specific item in house. He has very infrequently had difficulty recalling a street name; he has never gotten lost in a familiar or unfamiliar situation.  The above issue(s) have not rapidly progressed or become more noticeable to him or via his own comments about his wife's view of his subjective symptoms.    I reviewed his overall ADL's today and he been able to take care of own med(s), his finances, his self hygiene and he is very confident in his ability to take care of himself.    He has not noticed any changes in his walking,balance,abilty to read or write in the recent months.    No history of seizure(s) or seizure like episodes known to have occurred nor described to have happened at any time in his life.    No history of concussion(s).    No history of TIA or stroke like events known to have occurred.    No involuntary movements, progressive gait-balance difficulties, falls or near falls in the recent months.    No REM Sleep Behavioral symptoms or alison insomnia (averaging 8-9 hours of sleep most night awakening around 6 am)- will take a brief nap for 1 hour which he has done for many years since he retired in 2004.    ROS: Grandfather (mother's side)-  Late onset Dementia (onset in early 70's)          Patient Active Problem List    Diagnosis Date  Noted   • Memory loss 08/26/2020   • Essential hypertension 05/22/2020   • Multiple joint pain 05/22/2020   • History of resection of small bowel 08/15/2019   • Iron deficiency anemia 12/16/2015   • Episodic lightheadedness 12/11/2015   • History of prostate cancer 03/12/2015       Past medical history:   Past Medical History:   Diagnosis Date   • Cancer (HCC) 2013    prostate   • Essential hypertension 5/22/2020       Past surgical history:   Past Surgical History:   Procedure Laterality Date   • ELBOW EXPLORATION  2005    cubital tunnel   • BOWEL RESECTION  1996    volvulus, small intestine   • COLONOSCOPY  1996, 2009    neg   • PROSTATECTOMY, RADIAL Bilateral          Social history:   Social History     Socioeconomic History   • Marital status:      Spouse name: Not on file   • Number of children: Not on file   • Years of education: Not on file   • Highest education level: Not on file   Occupational History   • Not on file   Tobacco Use   • Smoking status: Never Smoker   • Smokeless tobacco: Never Used   • Tobacco comment: quit 1976,<1 pckyr   Substance and Sexual Activity   • Alcohol use: Yes     Comment: weekend   • Drug use: No   • Sexual activity: Yes     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: , manufacturing   Other Topics Concern   • Not on file   Social History Narrative   • Not on file     Social Determinants of Health     Financial Resource Strain:    • Difficulty of Paying Living Expenses:    Food Insecurity:    • Worried About Running Out of Food in the Last Year:    • Ran Out of Food in the Last Year:    Transportation Needs:    • Lack of Transportation (Medical):    • Lack of Transportation (Non-Medical):    Physical Activity:    • Days of Exercise per Week:    • Minutes of Exercise per Session:    Stress:    • Feeling of Stress :    Social Connections:    • Frequency of Communication with Friends and Family:    • Frequency of Social Gatherings with Friends and Family:    • Attends Shinto  Services:    • Active Member of Clubs or Organizations:    • Attends Club or Organization Meetings:    • Marital Status:    Intimate Partner Violence:    • Fear of Current or Ex-Partner:    • Emotionally Abused:    • Physically Abused:    • Sexually Abused:        Family history:   Family History   Problem Relation Age of Onset   • Cancer Father         prostate, age 90   • Stroke Mother    • Diabetes Neg Hx    • Heart Disease Neg Hx          Current medications:   Current Outpatient Medications   Medication   • meloxicam (MOBIC) 7.5 MG Tab   • Ascorbic Acid (VITAMIN C) 1000 MG Tab   • cyanocobalamin (VITAMIN B-12) 100 MCG Tab   • ferrous sulfate 325 (65 Fe) MG tablet   • vitamin D (CHOLECALCIFEROL) 1000 UNIT Tab   • Apoaequorin (PREVAGEN EXTRA STRENGTH) 20 MG Cap   • amLODIPine (NORVASC) 2.5 MG Tab     No current facility-administered medications for this visit.       Medication Allergy:  Allergies   Allergen Reactions   • Asa [Aspirin]            ROS:  (In the last 2-4 weeks unless otherwise stated for an additional period of time).    Constitutional: Denies fevers,chills,sweats,involuntary and unprovoked/progressive  weight loss.  Eyes: Denies changes in vision (blurring,double or loss of) nor any eye pain(s)- static,evolving or with eye movements.  Ears/Nose/Throat/Mouth: Denies nasal congestion,sore throat,ear pain(s) or changes in hearing ability.  No tinnitus.  Cardiovascular: Denies chest pain/pressure at rest or with exertion such as climbing stairs or walking. No  palpitations. There has been no  orthostatic lightheadedness/faintness events.  Respiratory: Denies SOB with exertion or when sleeping (while laying down).  Gastrointestinal/Hepatic: Denies abdominal pain, nausea, vomiting, diarrhea, constipation or GI bleeding   Genitourinary: Denies bladder dysfunction, dysuria or frequency   Musculoskeletal/Rheum: Denies joint pain and swelling   Skin/Breast: Denies rash, denies breast lumps or discharge  "  Neurological: Denies new or evolving headaches, new or evolving confusion/disorientation, seizure like events, or focal weakness/parasthesias.  There has been no falls or near falls.  Psychiatric: Denies feeling anxious,depressed,suicidal or having auditory/visual hallucinations.  Endocrine: Denies hx of diabetes or thyroid dysfunction   Heme/Oncology/ Denies easy or spontaneous bruising/bleeding from nose,skin  or a known bleeding disorder   Allergic/Immunologic: Denies hx of allergies         Physical examination:   Vitals:    03/31/21 1239   BP: 144/78   BP Location: Right arm   Patient Position: Sitting   BP Cuff Size: Adult   Pulse: 91   Temp: 37.2 °C (99 °F)   TempSrc: Temporal   SpO2: 98%   Weight: 66.2 kg (146 lb)   Height: 1.626 m (5' 4\")       Normal Cephalic Atraumatic.  General: Full Range of Movement around the Neck in all directions without restrictions or discrete pain evoked triggers.  No lower extremity edema.      Neurological  Exam:      [place] Cognitive Assessment (MOCA) Version 7.1    Years of Education: 4 years of college (Fine Arts)    TOTAL SCORE: 29/30    Visuospatial/Executive: 5/5  Trails Test  [x]  Copy Cube [x]  Clock:  Contour  [x]  Numbers [x]  Hands  [x]    Naming: 3/3  Lion  [x]  Rhino  [x]  Camel  [x]    Attention: 6/6  Digits forward  [x]  Digits backward [x]  Letter vigilance [x]  Serial 7 subtraction (answers) [x] [x] [x] [x] [x]   Serial 7 subtraction (sub score: 3/3)    Language: 3/3  Repetition [x] [x]  Fluency (14 F-words/min) [x] (check if >10)    Abstraction: 2/2    Delayed Recall 4/5   No cue:    Face [x] Velvet [x] Latter-day [] Trang [x] Red [x]  Category:  Face [] Velvet [] Latter-day [x] Trang [] Red []  Multiple choice: Face [] Velvet [] Latter-day [] Trang [] Red []    Orientation 6/6  Date  [x]  Month  [x]  Year  [x]   Day  [x]  Place  [x]  City  [x]          Mental status: Awake, alert and fully oriented to person, place, time and situation. Normal attention and " concentration.  Did not appear/act combative,irritable,anxious,paranoid/delusional or aggressive to or with me.    Speech and language: Speech is fluent without errors, intact to repetition and intact to naming.     Follows 3 step motor commands in sequence without significant delay and correctly.    Cranial nerve exam:  II: Pupils are equally round and reactive to light. Visual fields are intact by confrontation.  III, IV, VI: EOMI, no diplopia, no ptosis.  V: Sensation to light touch is normal over V1-3 distributions bilaterally.  , Jaw jerk is not present and Jaw closure strength is normal.  VII: Facial movements are symmetrical. There is no facial droop.  and Eye closure strength is normal..  VIII: Hearing intact to soft speech and finger rub bilaterally  IX: Palate elevates symmetrically, uvula is midline. Dysarthria is not present.  XI: Shoulder shrug are symmetrical and strong.   XII: Tongue protrudes midline. and Tongue strength is normal.  Recent and remote memory were normal  Attention span and concentration were normal  Language (naming, repetition, spontaneous speech) were normal but with a mild phillipino accent.      Motor exam:  Muscle tone is normal in all 4 limbs. and No abnormal movements appreciated.    Muscle strength:    Neck Flexors/Extensors: 5/5       Right  Left  Deltoid   5/5  5/5      Biceps   5/5  5/5  Triceps  5/5  5/5   Wrist extensors 5/5  5/5  Wrist flexors  5/5  5/5     5/5  5/5  Interossei  5/5  5/5  Thenar (APB)  5/5  5/5   Hip flexors  5/5  5/5  Quadriceps  5/5  5/5    Hamstrings  5/5  5/5  Dorsiflexors  5/5  5/5  Plantarflexors  5/5  5/5  Toe extension  5/5  5/5  NT = not tested    Sensory exam:  Intact to Vibration and Proprioception in bilaterally upper and lower extremity.    Vibratory threshold at great toes - 12 seconds at great toes, 14 seconds at ankles (symmetrical).      Reflexes:       Right  Left  Biceps   2/4  2/4  Triceps  2/4  2/4  Brachioradialis 2/4  2/4  Knee  jerk  2/4  2/4  Ankle jerk  2/4  2/4     Frontal release signs are absent.    bilaterally toes are downgoing to plantar stimulation..    Coordination (finger-to-nose, heel/knee/shin, rapid alternating movements) was normal.     There was no ataxia, no tremors, and no dysmetria.     Station and gait were normal.     Easily stands up from exam chair without retropulsion,veering,leaning,swaying (to either side).       Labs and Tests:     NEUROIMAGING: No recent brain imaging.        Impression/Plans/Recommendations:    1. Mild Cognitive Impairment (global deterioration score of 2 range).  Primarily issues with locating certain items but without consistently losing personal items.  No features of a rapidly progressive neuropsychiatric condition and there have not been any behavioral issues evolving. He does not at all feel excessive anxious nor depressed in the recent months.  He does feel his wife is aware of his concerns but from his own view she was not directly commenting to him about the issues he raised before he asked her about what he was experienced.        Today, I reviewed the clinical criteria and reviewed several  scenarios of the differences being using the medical terms of normal brain aging (age associated memory impairment),  Mild Cognitive Impairment (MCI) and Dementia.    MCI is a syndrome but statistically and for the majority of patients  occurs due  to a more rapid aging of the brain tissue or potentially from injury to certain parts of one's brain ( often from such contributing factors as  the cumulative effects of alcohol, from one or more ischemic or hemmorhagic stroke(s), from neurodegeneration or long standing with/without suboptimally controlled Hypertension). It is for some of these potential factors as to why I recommend a brain imaging test (Head CT or Brain MRI) be done for the 1st time or in certain circumstances repeated for comparison purposes  as such imaging can suggest one or more  "factors as to the reason(s) for the person's cognitive/memory changes. In fact, a normal or \"age related\" finding on a brain imaging test in and of itself is useful clinical and objective information to have in the evaluation of a person who has either an acute, evolving or even chronic (months to years) long cognitive/memory complaint.    Additional factors or contributors to Brain Health issues can be summarized in several books/references which I discussed as well today.     Goals going forwards include:    A. Paying attention to one's risk factors and reducing their prevalence or potential impact on one's changing memory/thinking> an excellent example would be to stop smoking, reduce or eliminate alcohol use (depending on the amount and frequency of usage), maintain good blood pressure control by buying a digital arm blood pressure cuff such as an OMRON Series 3 or 5 and checking one's blood pressure atleast 3 days per week (in the am and early afternoon) that the numbers are under 140/90 and working as needed with the primary care doctor  to optimize blood pressure control).      B. Encouraging proper sleep hygiene which for most adults is 7 to 8 hours of uninterrupted sleep per night.   (he feels that his sleep has been quite good and without any recent insomnia or excessive daytime sleepiness.    C. Encouraging some form of exercise preferable aerobic forms to be done (4 to 5 days per week- 30 minutes minimum per day)> 150 minutes per week as a goal. Example activities could include fast walking (up a slight incline), jogging, cycling (road or stationary), swimming,tennis. Essentially, even basic walking on a flat surface or a treadmill would be better than doing nothing.    D. Maintaining or forming new social contacts with family and friends  and a positive attitude despite the concerns and/or ongoing issues with thinking and/or memory.    E. Eating well which means a diet low in salt  (under 2 grams per " day), sugar and saturated fat.    F. Maintaining one's BMI (Body Mass Index) under 30.    G. I am going to ask Christophe to go the lab at his convenience and a few vitamin blood levels checked (B1,B12,Folate and methylmalonic acid).  I do not feel that brain imaging is needed at this time- very unlikely to affect management in my  view (this was specifically discussed with Christophe).    The patient understands and agrees that due to the complexity of his/her diagnosis, results of any testing and further recommendations will typically be discussed/made during a face to face encounter in my office and for simpler  matters either by a phone call or email correspondence. The patient and/or family further understands it is their responsibility to keep proper follow up as needed and when suggested by me.    I have performed  a history and physical exam and a directed /focused  ROS today.    Total time spent today or this patient's care was 45 minutes  and included reviewing diagnostic workup to date (labs and imaging that include interpreting such tests relevant to this patient's neurological condition),  reviewing/obtaining separately obtained history (from patient and/or accompanying friend/family member/caregiver)  for today's neurological problem(s) , ordering either/or medications and additioning tests, counseling and educating the patient/family/caregiver, documenting clinical information in the EMR.    Follow up  In 6 months or so with me        Sen Reed MD  [facility].   [facility]

## 2021-04-01 ENCOUNTER — TELEPHONE (OUTPATIENT)
Dept: NEUROLOGY | Facility: MEDICAL CENTER | Age: 66
End: 2021-04-01

## 2021-04-01 NOTE — TELEPHONE ENCOUNTER
I called the pt to schedule a 6 mo f/u appt, but he didn't answer and stated he can call us back and lvm.

## 2021-04-05 ENCOUNTER — TELEPHONE (OUTPATIENT)
Dept: NEUROLOGY | Facility: MEDICAL CENTER | Age: 66
End: 2021-04-05

## 2021-04-05 NOTE — TELEPHONE ENCOUNTER
I called Christophe to schedule up a 6mo f/u appt with  but no answer and lvm to call us back to schedule that with us.

## 2021-04-30 ENCOUNTER — TELEMEDICINE (OUTPATIENT)
Dept: MEDICAL GROUP | Facility: MEDICAL CENTER | Age: 66
End: 2021-04-30
Payer: MEDICARE

## 2021-04-30 VITALS — WEIGHT: 146 LBS | BODY MASS INDEX: 24.92 KG/M2 | HEIGHT: 64 IN

## 2021-04-30 DIAGNOSIS — K21.9 GASTROESOPHAGEAL REFLUX DISEASE, UNSPECIFIED WHETHER ESOPHAGITIS PRESENT: ICD-10-CM

## 2021-04-30 DIAGNOSIS — R05.9 COUGH: ICD-10-CM

## 2021-04-30 DIAGNOSIS — R06.89 DIFFICULTY BREATHING: ICD-10-CM

## 2021-04-30 PROCEDURE — 99214 OFFICE O/P EST MOD 30 MIN: CPT | Mod: 95,CR | Performed by: PHYSICIAN ASSISTANT

## 2021-04-30 RX ORDER — OMEPRAZOLE 40 MG/1
40 CAPSULE, DELAYED RELEASE ORAL DAILY
Qty: 30 CAPSULE | Refills: 0 | Status: SHIPPED | OUTPATIENT
Start: 2021-04-30 | End: 2021-05-27

## 2021-04-30 RX ORDER — SILDENAFIL 100 MG/1
100 TABLET, FILM COATED ORAL
COMMUNITY
Start: 2021-04-06 | End: 2023-03-03

## 2021-04-30 ASSESSMENT — FIBROSIS 4 INDEX: FIB4 SCORE: 0.94

## 2021-04-30 NOTE — PROGRESS NOTES
Virtual Visit: Established Patient   This visit was conducted via Zoom using secure and encrypted videoconferencing technology. The patient was in a private location in the state of [place].    The patient's identity was confirmed and verbal consent was obtained for this virtual visit.    Subjective:   CC:   Chief Complaint   Patient presents with   • Cough     Dry throat, feels like mucus is stuck in his, acid  build up, burning        Christophe Mc Rizo is a 66 y.o. male presenting for evaluation and management of:    Laryngitis, cough, throat feels dry, feels like mucus is stuck in throat    When he wakes up in the morning his throat is dry and he has a thick mucus that he has to spit up. During the day his voice is hoarse.    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     Has heartburn/reflux symptoms - occasionally takes tums    No sneezing, itching eyes, runny nose, no h/o seasonal allergies        ROS   Denies any recent fevers or chills. No nausea or vomiting. No chest pains or shortness of breath.     Allergies   Allergen Reactions   • Asa [Aspirin]        Current medicines (including changes today)  Current Outpatient Medications   Medication Sig Dispense Refill   • sildenafil citrate (VIAGRA) 100 MG tablet Take 100 mg by mouth.     • Apoaequorin (PREVAGEN EXTRA STRENGTH) 20 MG Cap Take 1 capsule by mouth every day. 90 capsule 3   • meloxicam (MOBIC) 7.5 MG Tab Take 1 Tab by mouth every day. 90 Tab 1   • Ascorbic Acid (VITAMIN C) 1000 MG Tab Take  by mouth.     • amLODIPine (NORVASC) 2.5 MG Tab Take 1 Tab by mouth every day. 90 Tab 3   • cyanocobalamin (VITAMIN B-12) 100 MCG Tab Take 100 mcg by mouth every day.     • ferrous sulfate 325 (65 Fe) MG tablet Take 325 mg by mouth every day.     • vitamin D (CHOLECALCIFEROL) 1000 UNIT Tab Take 1,000 Units by mouth every day.       No current facility-administered medications for this visit.       Patient Active Problem  "List    Diagnosis Date Noted   • Memory loss 08/26/2020   • Essential hypertension 05/22/2020   • Multiple joint pain 05/22/2020   • History of resection of small bowel 08/15/2019   • Iron deficiency anemia 12/16/2015   • Episodic lightheadedness 12/11/2015   • History of prostate cancer 03/12/2015       Family History   Problem Relation Age of Onset   • Cancer Father         prostate, age 90   • Stroke Mother    • Diabetes Neg Hx    • Heart Disease Neg Hx        He  has a past medical history of Cancer (HCC) (2013) and Essential hypertension (5/22/2020). He also has no past medical history of Hyperlipidemia or Thyroid disease.  He  has a past surgical history that includes bowel resection (1996); elbow exploration (2005); colonoscopy (1996, 2009); and prostatectomy, radial (Bilateral).       Objective:   Ht 1.626 m (5' 4\")   Wt 66.2 kg (146 lb) Comment: Pt reported  BMI 25.06 kg/m²     Physical Exam:  Constitutional: Alert, no distress, well-groomed.  Skin: No rashes in visible areas.  Eye: Round. Conjunctiva clear, lids normal. No icterus.   ENMT: Lips pink without lesions, good dentition, moist mucous membranes. Phonation normal.  Neck: No masses, no thyromegaly. Moves freely without pain.  Respiratory: Unlabored respiratory effort, no cough or audible wheeze  Psych: Alert and oriented x3, normal affect and mood.       Assessment and Plan:   The following treatment plan was discussed:     1. Cough    2. Gastroesophageal reflux disease, unspecified whether esophagitis present    3. Difficulty breathing    Other orders  - sildenafil citrate (VIAGRA) 100 MG tablet; Take 100 mg by mouth.        Follow-up: 2 weeks         "

## 2021-05-14 ENCOUNTER — OFFICE VISIT (OUTPATIENT)
Dept: MEDICAL GROUP | Facility: MEDICAL CENTER | Age: 66
End: 2021-05-14
Payer: MEDICARE

## 2021-05-14 VITALS
TEMPERATURE: 98.7 F | HEART RATE: 68 BPM | OXYGEN SATURATION: 97 % | SYSTOLIC BLOOD PRESSURE: 128 MMHG | HEIGHT: 64 IN | DIASTOLIC BLOOD PRESSURE: 68 MMHG | WEIGHT: 143 LBS | BODY MASS INDEX: 24.41 KG/M2

## 2021-05-14 DIAGNOSIS — Z23 NEED FOR VACCINATION: ICD-10-CM

## 2021-05-14 DIAGNOSIS — R41.3 MEMORY LOSS: ICD-10-CM

## 2021-05-14 DIAGNOSIS — M25.50 MULTIPLE JOINT PAIN: ICD-10-CM

## 2021-05-14 DIAGNOSIS — R05.3 CHRONIC COUGH: ICD-10-CM

## 2021-05-14 DIAGNOSIS — I10 ESSENTIAL HYPERTENSION: ICD-10-CM

## 2021-05-14 DIAGNOSIS — J37.0 CHRONIC LARYNGITIS: ICD-10-CM

## 2021-05-14 PROCEDURE — 99214 OFFICE O/P EST MOD 30 MIN: CPT | Mod: 25 | Performed by: PHYSICIAN ASSISTANT

## 2021-05-14 PROCEDURE — G0009 ADMIN PNEUMOCOCCAL VACCINE: HCPCS | Performed by: PHYSICIAN ASSISTANT

## 2021-05-14 PROCEDURE — 90732 PPSV23 VACC 2 YRS+ SUBQ/IM: CPT | Performed by: PHYSICIAN ASSISTANT

## 2021-05-14 RX ORDER — AMLODIPINE BESYLATE 2.5 MG/1
2.5 TABLET ORAL DAILY
Qty: 90 TABLET | Refills: 3 | Status: SHIPPED | OUTPATIENT
Start: 2021-05-14 | End: 2022-01-08 | Stop reason: SDUPTHER

## 2021-05-14 RX ORDER — AMOXICILLIN 500 MG/1
500 CAPSULE ORAL 3 TIMES DAILY
Qty: 30 CAPSULE | Refills: 0 | Status: SHIPPED | OUTPATIENT
Start: 2021-05-14 | End: 2021-05-24

## 2021-05-14 ASSESSMENT — FIBROSIS 4 INDEX: FIB4 SCORE: 0.94

## 2021-05-14 NOTE — ASSESSMENT & PLAN NOTE
States he has not been taking his amlodipine for the past year, unclear why not. BPs at home up to 157/85 and as low as 116/70.

## 2021-05-14 NOTE — PROGRESS NOTES
Subjective:   Christophe Rizo is a 66 y.o. male here today for f/u on mucus in throat, BP    Essential hypertension  States he has not been taking his amlodipine for the past year, unclear why not. BPs at home up to 157/85 and as low as 116/70.     Chronic cough  Just because of mucus in throat, omeprazole helped heartburn but not the mucus. No chest pain, SOB, difficulty breathing. No weight loss. No fever/chills.     Chronic laryngitis  Feeling of thick mucus in throat since December. Has had it in the past but went away quickly. Having hoarseness of voice. No improvement with omeprazole. No symptoms of allergy. No sore throat.    Multiple joint pain  Taking meloxicam as needed    Memory loss  Evaluated with neurology, no imaging rec at this time       Current medicines (including changes today)  Current Outpatient Medications   Medication Sig Dispense Refill   • amLODIPine (NORVASC) 2.5 MG Tab Take 1 tablet by mouth every day. 90 tablet 3   • amoxicillin (AMOXIL) 500 MG Cap Take 1 capsule by mouth 3 times a day for 10 days. 30 capsule 0   • sildenafil citrate (VIAGRA) 100 MG tablet Take 100 mg by mouth.     • omeprazole (PRILOSEC) 40 MG delayed-release capsule Take 1 capsule by mouth every day. 30 capsule 0   • Apoaequorin (PREVAGEN EXTRA STRENGTH) 20 MG Cap Take 1 capsule by mouth every day. 90 capsule 3   • meloxicam (MOBIC) 7.5 MG Tab Take 1 Tab by mouth every day. 90 Tab 1   • Ascorbic Acid (VITAMIN C) 1000 MG Tab Take  by mouth.     • cyanocobalamin (VITAMIN B-12) 100 MCG Tab Take 100 mcg by mouth every day.     • ferrous sulfate 325 (65 Fe) MG tablet Take 325 mg by mouth every day.     • vitamin D (CHOLECALCIFEROL) 1000 UNIT Tab Take 1,000 Units by mouth every day.       No current facility-administered medications for this visit.     He  has a past medical history of Cancer (HCC) (2013) and Essential hypertension (5/22/2020). He also has no past medical history of Hyperlipidemia or Thyroid  "disease.    ROS   No fever/chills. No weight change. No headache/dizziness. No focal weakness. No sore throat, nasal congestion, ear pain. No chest pain, no shortness of breath, difficulty breathing. No n/v/d/c or abdominal pain. No urinary complaint. No rash or skin lesion. No joint pain or swelling.       Objective:     /68 (BP Location: Right arm, Patient Position: Sitting, BP Cuff Size: Adult)   Pulse 68   Temp 37.1 °C (98.7 °F) (Temporal)   Ht 1.626 m (5' 4\")   Wt 64.9 kg (143 lb)   SpO2 97%  Body mass index is 24.55 kg/m².   Physical Exam:  Constitutional: WDWN, NAD  Skin: Warm, dry, good turgor, no rashes in visible areas.  Respiratory: Unlabored respiratory effort, lungs clear to auscultation, no wheezes, no ronchi.  Cardiovascular: Normal S1, S2, no murmur, no leg edema.  Psych: Alert and oriented x3, normal affect and mood.    Assessment and Plan:   The following treatment plan was discussed    1. Essential hypertension    - amLODIPine (NORVASC) 2.5 MG Tab; Take 1 tablet by mouth every day.  Dispense: 90 tablet; Refill: 3    2. Need for vaccination    - Pneumovax Vaccine (PPSV23)    3. Chronic cough    - amoxicillin (AMOXIL) 500 MG Cap; Take 1 capsule by mouth 3 times a day for 10 days.  Dispense: 30 capsule; Refill: 0    4. Chronic laryngitis    - REFERRAL TO ENT  - amoxicillin (AMOXIL) 500 MG Cap; Take 1 capsule by mouth 3 times a day for 10 days.  Dispense: 30 capsule; Refill: 0    5. Multiple joint pain      6. Memory loss        Followup: 3-6 months BP follow up         "

## 2021-05-14 NOTE — ASSESSMENT & PLAN NOTE
Feeling of thick mucus in throat since December. Has had it in the past but went away quickly. Having hoarseness of voice. No improvement with omeprazole. No symptoms of allergy. No sore throat.

## 2021-05-14 NOTE — ASSESSMENT & PLAN NOTE
Just because of mucus in throat, omeprazole helped heartburn but not the mucus. No chest pain, SOB, difficulty breathing. No weight loss. No fever/chills.

## 2021-05-27 DIAGNOSIS — K21.9 GASTROESOPHAGEAL REFLUX DISEASE, UNSPECIFIED WHETHER ESOPHAGITIS PRESENT: ICD-10-CM

## 2021-05-27 DIAGNOSIS — R05.9 COUGH: ICD-10-CM

## 2021-05-27 RX ORDER — OMEPRAZOLE 40 MG/1
40 CAPSULE, DELAYED RELEASE ORAL DAILY
Qty: 90 CAPSULE | Refills: 1 | Status: SHIPPED | OUTPATIENT
Start: 2021-05-27 | End: 2021-09-09

## 2021-06-29 ENCOUNTER — TELEPHONE (OUTPATIENT)
Dept: MEDICAL GROUP | Facility: MEDICAL CENTER | Age: 66
End: 2021-06-29

## 2021-06-29 NOTE — TELEPHONE ENCOUNTER
Phone Number Called: [phone] (home)     Call outcome: Did not leave a detailed message. Requested patient to call back.    Message: Re: ENT referral

## 2021-09-09 DIAGNOSIS — K21.9 GASTROESOPHAGEAL REFLUX DISEASE, UNSPECIFIED WHETHER ESOPHAGITIS PRESENT: ICD-10-CM

## 2021-09-09 DIAGNOSIS — R05.9 COUGH: ICD-10-CM

## 2021-09-09 RX ORDER — OMEPRAZOLE 40 MG/1
40 CAPSULE, DELAYED RELEASE ORAL DAILY
Qty: 90 CAPSULE | Refills: 3 | Status: SHIPPED | OUTPATIENT
Start: 2021-09-09 | End: 2023-03-03

## 2021-10-11 DIAGNOSIS — M25.50 MULTIPLE JOINT PAIN: ICD-10-CM

## 2021-10-12 RX ORDER — MELOXICAM 7.5 MG/1
TABLET ORAL
Qty: 90 TABLET | Refills: 1 | Status: SHIPPED | OUTPATIENT
Start: 2021-10-12 | End: 2022-01-08 | Stop reason: SDUPTHER

## 2021-11-01 ENCOUNTER — HOSPITAL ENCOUNTER (OUTPATIENT)
Dept: LAB | Facility: MEDICAL CENTER | Age: 66
End: 2021-11-01
Attending: UROLOGY
Payer: MEDICARE

## 2021-11-01 LAB — PSA SERPL-MCNC: 0.03 NG/ML (ref 0–4)

## 2021-11-01 PROCEDURE — 84153 ASSAY OF PSA TOTAL: CPT

## 2021-11-01 PROCEDURE — 36415 COLL VENOUS BLD VENIPUNCTURE: CPT

## 2022-01-08 ENCOUNTER — HOSPITAL ENCOUNTER (OUTPATIENT)
Facility: MEDICAL CENTER | Age: 67
End: 2022-01-08
Attending: PHYSICIAN ASSISTANT
Payer: MEDICARE

## 2022-01-08 ENCOUNTER — OFFICE VISIT (OUTPATIENT)
Dept: URGENT CARE | Facility: PHYSICIAN GROUP | Age: 67
End: 2022-01-08
Payer: MEDICARE

## 2022-01-08 VITALS
SYSTOLIC BLOOD PRESSURE: 152 MMHG | HEART RATE: 74 BPM | OXYGEN SATURATION: 98 % | WEIGHT: 143 LBS | HEIGHT: 64 IN | DIASTOLIC BLOOD PRESSURE: 72 MMHG | BODY MASS INDEX: 24.41 KG/M2 | TEMPERATURE: 99.4 F | RESPIRATION RATE: 18 BRPM

## 2022-01-08 DIAGNOSIS — R05.9 COUGH: Primary | ICD-10-CM

## 2022-01-08 DIAGNOSIS — Z20.822 EXPOSURE TO COVID-19 VIRUS: ICD-10-CM

## 2022-01-08 DIAGNOSIS — R41.3 MEMORY LOSS: ICD-10-CM

## 2022-01-08 DIAGNOSIS — M25.50 MULTIPLE JOINT PAIN: ICD-10-CM

## 2022-01-08 DIAGNOSIS — I10 ESSENTIAL HYPERTENSION: ICD-10-CM

## 2022-01-08 DIAGNOSIS — R09.81 NASAL CONGESTION: ICD-10-CM

## 2022-01-08 PROCEDURE — U0003 INFECTIOUS AGENT DETECTION BY NUCLEIC ACID (DNA OR RNA); SEVERE ACUTE RESPIRATORY SYNDROME CORONAVIRUS 2 (SARS-COV-2) (CORONAVIRUS DISEASE [COVID-19]), AMPLIFIED PROBE TECHNIQUE, MAKING USE OF HIGH THROUGHPUT TECHNOLOGIES AS DESCRIBED BY CMS-2020-01-R: HCPCS

## 2022-01-08 PROCEDURE — U0005 INFEC AGEN DETEC AMPLI PROBE: HCPCS

## 2022-01-08 PROCEDURE — 99213 OFFICE O/P EST LOW 20 MIN: CPT | Mod: CS | Performed by: PHYSICIAN ASSISTANT

## 2022-01-08 ASSESSMENT — ENCOUNTER SYMPTOMS
SINUS PAIN: 0
COUGH: 1
FEVER: 0
SORE THROAT: 0
RHINORRHEA: 1
SHORTNESS OF BREATH: 0
SPUTUM PRODUCTION: 0
WHEEZING: 0

## 2022-01-08 ASSESSMENT — FIBROSIS 4 INDEX: FIB4 SCORE: 0.94

## 2022-01-08 ASSESSMENT — COPD QUESTIONNAIRES: COPD: 0

## 2022-01-08 NOTE — PROGRESS NOTES
Subjective     Christophe Rizo is a 66 y.o. male who presents with Cough (congestion, runny nose, x1 week.)    Medications:    • amLODIPine Tabs  • cyanocobalamin Tabs  • ferrous sulfate  • meloxicam Tabs  • omeprazole  • Prevagen Extra Strength Caps  • sildenafil citrate  • Vitamin C Tabs  • vitamin D Tabs    Allergies: Asa [aspirin]    Problem List: Christophe Rizo does not have any pertinent problems on file.    Surgical History:  Past Surgical History:   Procedure Laterality Date   • ELBOW EXPLORATION  2005    cubital tunnel   • BOWEL RESECTION  1996    volvulus, small intestine   • COLONOSCOPY  1996, 2009    neg   • PROSTATECTOMY, RADIAL Bilateral        Past Social Hx: Christophe Rizo  reports that he has never smoked. He has never used smokeless tobacco. He reports current alcohol use. He reports that he does not use drugs.     Past Family Hx:  Christophe Rizo family history includes Cancer in his father; Stroke in his mother.     Problem list, medications, and allergies reviewed by myself today in Epic.        Patient presents with:  Cough: congestion, runny nose, x1 week.    Pt has been vaccinated against COVID-19 including booster.     Cough  This is a new problem. The current episode started in the past 7 days. The problem has been unchanged. The problem occurs every few minutes. The cough is productive of sputum. Associated symptoms include nasal congestion, postnasal drip and rhinorrhea. Pertinent negatives include no fever, sore throat, shortness of breath or wheezing. The symptoms are aggravated by cold air. He has tried OTC cough suppressant for the symptoms. The treatment provided mild relief. There is no history of asthma, bronchiectasis, COPD or environmental allergies.       Review of Systems   Constitutional: Negative for fever.   HENT: Positive for congestion, postnasal drip and rhinorrhea. Negative for sinus pain and sore throat.    Respiratory: Positive for cough.  "Negative for sputum production, shortness of breath and wheezing.    Endo/Heme/Allergies: Negative for environmental allergies.   All other systems reviewed and are negative.             Objective     /72   Pulse 74   Temp 37.4 °C (99.4 °F) (Temporal)   Resp 18   Ht 1.626 m (5' 4\")   Wt 64.9 kg (143 lb)   SpO2 98%   BMI 24.55 kg/m²      Physical Exam  Vitals and nursing note reviewed.   Constitutional:       General: He is not in acute distress.     Appearance: Normal appearance. He is well-developed and normal weight. He is not ill-appearing or toxic-appearing.   HENT:      Head: Normocephalic and atraumatic.      Right Ear: Tympanic membrane normal.      Left Ear: Tympanic membrane normal.      Nose: Congestion and rhinorrhea present.      Mouth/Throat:      Lips: Pink.      Mouth: Mucous membranes are moist.      Pharynx: Oropharynx is clear. Uvula midline. No posterior oropharyngeal erythema.   Eyes:      Extraocular Movements: Extraocular movements intact.      Conjunctiva/sclera: Conjunctivae normal.      Pupils: Pupils are equal, round, and reactive to light.   Cardiovascular:      Rate and Rhythm: Normal rate and regular rhythm.      Pulses: Normal pulses.      Heart sounds: Normal heart sounds.   Pulmonary:      Effort: Pulmonary effort is normal.      Breath sounds: Normal breath sounds.   Abdominal:      General: Bowel sounds are normal.      Palpations: Abdomen is soft.   Musculoskeletal:         General: Normal range of motion.      Cervical back: Normal range of motion and neck supple.   Skin:     General: Skin is warm and dry.      Capillary Refill: Capillary refill takes less than 2 seconds.   Neurological:      General: No focal deficit present.      Mental Status: He is alert and oriented to person, place, and time.      Cranial Nerves: No cranial nerve deficit.      Motor: Motor function is intact.      Coordination: Coordination is intact.      Gait: Gait normal.   Psychiatric:       "   Mood and Affect: Mood normal.                     Assessment & Plan              1. Cough  COVID/SARS CoV-2 PCR   2. Nasal congestion  COVID/SARS CoV-2 PCR   3. Exposure to COVID-19 virus  COVID/SARS CoV-2 PCR     Per protocol for PUI/ISO patients, the patient was evaluated by me while I was wearing PPE.  Per CDC guidelines, patient has been instructed to self quarantine at home until test results are known.  IF positive, pt to remain at home for 10 days from onset of symptoms.  If negative, pt to remain at home until fever has resolved.       Discussed that I felt this was viral in nature. Did not see any evidence of a bacterial process. Discussed natural progression and sx care.    PT can begin or continue OTC medications, increase fluids and rest until symptoms improve.     PT should follow up with PCP in 1-2 days for re-evaluation if symptoms have not improved.      Discussed red flags and reasons to return to UC or ED.      Pt and/or family verbalized understanding of diagnosis and follow up instructions and was offered informational handout on diagnosis.  PT discharged.

## 2022-01-09 DIAGNOSIS — Z20.822 EXPOSURE TO COVID-19 VIRUS: ICD-10-CM

## 2022-01-09 DIAGNOSIS — R09.81 NASAL CONGESTION: ICD-10-CM

## 2022-01-09 DIAGNOSIS — R05.9 COUGH: ICD-10-CM

## 2022-01-10 LAB
COVID ORDER STATUS COVID19: NORMAL
SARS-COV-2 RNA RESP QL NAA+PROBE: NOTDETECTED
SPECIMEN SOURCE: NORMAL

## 2022-01-10 RX ORDER — AMLODIPINE BESYLATE 2.5 MG/1
2.5 TABLET ORAL DAILY
Qty: 90 TABLET | Refills: 3 | Status: SHIPPED | OUTPATIENT
Start: 2022-01-10 | End: 2022-09-28 | Stop reason: SDUPTHER

## 2022-01-10 RX ORDER — MELOXICAM 7.5 MG/1
7.5 TABLET ORAL
Qty: 90 TABLET | Refills: 3 | Status: SHIPPED | OUTPATIENT
Start: 2022-01-10 | End: 2023-03-03

## 2022-05-13 ENCOUNTER — OFFICE VISIT (OUTPATIENT)
Dept: MEDICAL GROUP | Facility: PHYSICIAN GROUP | Age: 67
End: 2022-05-13
Payer: MEDICARE

## 2022-05-13 VITALS
WEIGHT: 144.6 LBS | SYSTOLIC BLOOD PRESSURE: 160 MMHG | RESPIRATION RATE: 16 BRPM | TEMPERATURE: 98.6 F | OXYGEN SATURATION: 98 % | DIASTOLIC BLOOD PRESSURE: 80 MMHG | BODY MASS INDEX: 24.69 KG/M2 | HEART RATE: 68 BPM | HEIGHT: 64 IN

## 2022-05-13 DIAGNOSIS — H90.0 CONDUCTIVE HEARING LOSS, BILATERAL: ICD-10-CM

## 2022-05-13 DIAGNOSIS — S46.812A STRAIN OF LEFT SUPRASPINATUS MUSCLE, INITIAL ENCOUNTER: ICD-10-CM

## 2022-05-13 DIAGNOSIS — I10 ESSENTIAL HYPERTENSION: ICD-10-CM

## 2022-05-13 DIAGNOSIS — Z00.00 HEALTHCARE MAINTENANCE: ICD-10-CM

## 2022-05-13 DIAGNOSIS — R41.3 MEMORY LOSS: ICD-10-CM

## 2022-05-13 DIAGNOSIS — D50.8 OTHER IRON DEFICIENCY ANEMIA: ICD-10-CM

## 2022-05-13 PROCEDURE — 99214 OFFICE O/P EST MOD 30 MIN: CPT | Performed by: STUDENT IN AN ORGANIZED HEALTH CARE EDUCATION/TRAINING PROGRAM

## 2022-05-13 ASSESSMENT — FIBROSIS 4 INDEX: FIB4 SCORE: 0.95

## 2022-05-13 ASSESSMENT — PATIENT HEALTH QUESTIONNAIRE - PHQ9: CLINICAL INTERPRETATION OF PHQ2 SCORE: 0

## 2022-05-13 NOTE — PROGRESS NOTES
Subjective:     CC:  Diagnoses of Other iron deficiency anemia, Essential hypertension, Memory loss, Conductive hearing loss, bilateral, Strain of left supraspinatus muscle, initial encounter, and Healthcare maintenance were pertinent to this visit.    HISTORY OF THE PRESENT ILLNESS: Patient is a 67 y.o. male.  He was formally a patient of Latia Payton PA-C.  This pleasant patient is here today to establish care:    1.  Iron deficiency anemia  Patient has a longstanding history of iron deficiency anemia secondary to small bowel resection.  He does take ferrous sulfate 65 mg equivalent although not in a consistent way.  His last labs were in 2020.    2.  Essential hypertension  Longstanding.  Patient presents today 160/80.  He has been compliant with his amlodipine 2.5 mg daily.  He does have the equipment at home to monitor his blood pressure but he does not routinely do so.    3.  Memory loss  Patient did see Dr. Reed for 1 visit but did not follow-up.  Patient reports that despite having lived in [place] for many years he often forgets how to get to major streets and intersections that were previously familiar to him.    4.  Bilateral hearing loss  This is been getting progressively worse over several months.    5.  Left shoulder pain  Patient is left-handed and developed left shoulder pain about 2 to 3 days ago.  This was spontaneous, without eliciting event.  It is difficult for him to lift his arm up to his side and is beginning to affect activities of daily living.    Active Diagnosis:    Patient Active Problem List   Diagnosis   • History of prostate cancer   • Episodic lightheadedness   • Iron deficiency anemia   • History of resection of small bowel   • Essential hypertension   • Multiple joint pain   • Memory loss   • Chronic cough   • Chronic laryngitis   • Conductive hearing loss, bilateral   • Strain of left supraspinatus muscle      Current Outpatient Medications Ordered in Epic   Medication Sig  "Dispense Refill   • Apoaequorin (PREVAGEN EXTRA STRENGTH) 20 MG Cap Take 1 Capsule by mouth every day. 90 Capsule 3   • amLODIPine (NORVASC) 2.5 MG Tab Take 1 Tablet by mouth every day. 90 Tablet 3   • meloxicam (MOBIC) 7.5 MG Tab Take 1 Tablet by mouth every day. 90 Tablet 3   • omeprazole (PRILOSEC) 40 MG delayed-release capsule TAKE 1 CAPSULE BY MOUTH EVERY DAY 90 Capsule 3   • sildenafil citrate (VIAGRA) 100 MG tablet Take 100 mg by mouth.     • Ascorbic Acid (VITAMIN C) 1000 MG Tab Take  by mouth.     • cyanocobalamin (VITAMIN B-12) 100 MCG Tab Take 100 mcg by mouth every day.     • ferrous sulfate 325 (65 Fe) MG tablet Take 325 mg by mouth every day.     • vitamin D (CHOLECALCIFEROL) 1000 UNIT Tab Take 1,000 Units by mouth every day.       No current [facility]-ordered facility-administered medications on file.     ROS:   Gen: No fevers/chills, no changes in weight  HEENT: No changes in vision, sore throat.  See HPI  Pulm: No cough, unexplained SOB.  CV: No chest pain/pressure, no palpitations  GI: No nausea/vomiting, no diarrhea  : No dysuria/nocturia  MSk: No myalgias.  See HPI.  Skin: No rash/skin changes  Neuro: No headaches, no numbness/tingling  Heme/Lymph: no easy bruising      Objective:     Exam: BP (!) 160/80 (BP Location: Left arm, Patient Position: Sitting, BP Cuff Size: Adult)   Pulse 68   Temp 37 °C (98.6 °F) (Temporal)   Resp 16   Ht 1.626 m (5' 4\")   Wt 65.6 kg (144 lb 9.6 oz)   SpO2 98%  Body mass index is 24.82 kg/m².    General: Normal appearing. No distress.  HEENT: Normocephalic. Eyes conjunctiva clear lids without ptosis, pupils equal and reactive to light accommodation. Ears normal shape and contour, canals are clear bilaterally, tympanic membranes are benign, nasal mucosa benign, oropharynx is without erythema, edema or exudates.  Patient fails finger rub test bilaterally at all distances.  Neck: Supple without JVD. Thyroid is not enlarged.  Pulmonary: Clear to ausculation.  Normal " effort. No rales, ronchi, or wheezing.  Cardiovascular: Regular rate and rhythm without murmur. Radial pulses are intact and equal bilaterally.  Abdomen: Nondistended   neurologic: Grossly nonfocal.  CN II through XII intact.  Skin: Warm and dry.  No obvious lesions.  Musculoskeletal: Normal gait. No extremity cyanosis, clubbing, or edema.  Shoulder exam: Active ROM limited by pain.  No overlying erythema/swelling/deformity.  The head of the shoulders is noted to be asymmetric with the left shoulder being lower.  No AC tenderness.  Negative internal/external rotation.  Negative liftoff.  Positive Paula test.  Psych: Normal mood and affect. Alert and oriented x3. Judgment and insight are normal.    A chaperone was offered to the patient during today's exam. Patient declined chaperone.    Labs:   8/26/2020:  -CBC showing hemoglobin 11.5, hematocrit 37.9, MCV 78.1  -CMP showing elevated glucose 130, this may not be a fasting lab.    Assessment & Plan:   67 y.o. male with the following -    1.  Iron deficiency anemia  -Chronic, likely undertreated.  -Fast the patient to be given taking his iron supplement at least 3 days a week on an empty stomach along with his vitamin C supplement.  -CBC in 3 months time.  -Iron panel in 3 months time.    2.  Essential hypertension  -Chronic.  We are not at goal today.  -Continue amlodipine 2.5 mg daily.  -I have given the patient a blood pressure log and asked him to give me 7 days worth of readings.  I have included instructions on proper blood pressure monitoring.    3.  Memory loss  -Chronic.  -Refer to neurology, memory clinic.    4.  Bilateral hearing loss  -Undiagnosed new problem.  -Refer to audiology.    5.  Left shoulder pain  -Acute uncomplicated injury  -I have instructed the patient to use his meloxicam for management of pain and to engage in slow and careful range of motion exercises.  -Refer to PT    6.  Healthcare maintenance  -I have asked the patient receive his Tdap  and Shingrix vaccine at the pharmacy of his choice.  -CBC, CMP, lipid profile.    Return in about 3 months (around 8/13/2022).  For evaluation of physical therapy progress, iron deficiency anemia, lab review.    Please note that this dictation was created using voice recognition software. I have made every reasonable attempt to correct obvious errors, but I expect that there are errors of grammar and possibly content that I did not discover before finalizing the note.      Jose C Kolb PA-C 5/13/2022

## 2022-05-13 NOTE — PATIENT INSTRUCTIONS
1) Obtain your Shingrix and Tdap vaccine at the pharmacy of your choice.    2) Take your Iron supplement on an empty stomach with your vitamin C tablet at least three times a week. Until you get your labs.    3) Take your vitamin D and calcium with dinner.    4) Take your B12 anytime of the day.

## 2022-08-01 ENCOUNTER — HOSPITAL ENCOUNTER (OUTPATIENT)
Dept: LAB | Facility: MEDICAL CENTER | Age: 67
End: 2022-08-01
Attending: STUDENT IN AN ORGANIZED HEALTH CARE EDUCATION/TRAINING PROGRAM
Payer: MEDICARE

## 2022-08-01 DIAGNOSIS — Z00.00 HEALTHCARE MAINTENANCE: ICD-10-CM

## 2022-08-01 DIAGNOSIS — D50.8 OTHER IRON DEFICIENCY ANEMIA: ICD-10-CM

## 2022-08-01 LAB
ALBUMIN SERPL BCP-MCNC: 4.3 G/DL (ref 3.2–4.9)
ALBUMIN/GLOB SERPL: 1.4 G/DL
ALP SERPL-CCNC: 66 U/L (ref 30–99)
ALT SERPL-CCNC: 16 U/L (ref 2–50)
ANION GAP SERPL CALC-SCNC: 12 MMOL/L (ref 7–16)
AST SERPL-CCNC: 21 U/L (ref 12–45)
BILIRUB SERPL-MCNC: 0.6 MG/DL (ref 0.1–1.5)
BUN SERPL-MCNC: 10 MG/DL (ref 8–22)
CALCIUM SERPL-MCNC: 9 MG/DL (ref 8.5–10.5)
CHLORIDE SERPL-SCNC: 103 MMOL/L (ref 96–112)
CHOLEST SERPL-MCNC: 166 MG/DL (ref 100–199)
CO2 SERPL-SCNC: 25 MMOL/L (ref 20–33)
CREAT SERPL-MCNC: 0.84 MG/DL (ref 0.5–1.4)
ERYTHROCYTE [DISTWIDTH] IN BLOOD BY AUTOMATED COUNT: 52.2 FL (ref 35.9–50)
FASTING STATUS PATIENT QL REPORTED: NORMAL
FERRITIN SERPL-MCNC: 19.3 NG/ML (ref 22–322)
GFR SERPLBLD CREATININE-BSD FMLA CKD-EPI: 94 ML/MIN/1.73 M 2
GLOBULIN SER CALC-MCNC: 3 G/DL (ref 1.9–3.5)
GLUCOSE SERPL-MCNC: 94 MG/DL (ref 65–99)
HCT VFR BLD AUTO: 47.6 % (ref 42–52)
HDLC SERPL-MCNC: 43 MG/DL
HGB BLD-MCNC: 15 G/DL (ref 14–18)
IRON SATN MFR SERPL: 18 % (ref 15–55)
IRON SERPL-MCNC: 66 UG/DL (ref 50–180)
LDLC SERPL CALC-MCNC: 92 MG/DL
MCH RBC QN AUTO: 25.4 PG (ref 27–33)
MCHC RBC AUTO-ENTMCNC: 31.5 G/DL (ref 33.7–35.3)
MCV RBC AUTO: 80.7 FL (ref 81.4–97.8)
PLATELET # BLD AUTO: 262 K/UL (ref 164–446)
PMV BLD AUTO: 9.2 FL (ref 9–12.9)
POTASSIUM SERPL-SCNC: 4 MMOL/L (ref 3.6–5.5)
PROT SERPL-MCNC: 7.3 G/DL (ref 6–8.2)
RBC # BLD AUTO: 5.9 M/UL (ref 4.7–6.1)
SODIUM SERPL-SCNC: 140 MMOL/L (ref 135–145)
TIBC SERPL-MCNC: 359 UG/DL (ref 250–450)
TRIGL SERPL-MCNC: 157 MG/DL (ref 0–149)
UIBC SERPL-MCNC: 293 UG/DL (ref 110–370)
WBC # BLD AUTO: 6.1 K/UL (ref 4.8–10.8)

## 2022-08-01 PROCEDURE — 36415 COLL VENOUS BLD VENIPUNCTURE: CPT

## 2022-08-01 PROCEDURE — 85027 COMPLETE CBC AUTOMATED: CPT

## 2022-08-01 PROCEDURE — 83550 IRON BINDING TEST: CPT

## 2022-08-01 PROCEDURE — 82728 ASSAY OF FERRITIN: CPT

## 2022-08-01 PROCEDURE — 80061 LIPID PANEL: CPT | Mod: GA

## 2022-08-01 PROCEDURE — 80053 COMPREHEN METABOLIC PANEL: CPT

## 2022-08-01 PROCEDURE — 83540 ASSAY OF IRON: CPT

## 2022-08-08 NOTE — PROGRESS NOTES
Patient returns a blood pressure log via [employer].  Average systolic for the last 7 days 127.    Jose C Kolb PA-C

## 2022-08-12 ENCOUNTER — PHYSICAL THERAPY (OUTPATIENT)
Dept: PHYSICAL THERAPY | Facility: REHABILITATION | Age: 67
End: 2022-08-12
Attending: STUDENT IN AN ORGANIZED HEALTH CARE EDUCATION/TRAINING PROGRAM
Payer: MEDICARE

## 2022-08-12 DIAGNOSIS — S46.812A: ICD-10-CM

## 2022-08-12 PROCEDURE — 97110 THERAPEUTIC EXERCISES: CPT

## 2022-08-12 ASSESSMENT — ENCOUNTER SYMPTOMS
PAIN SCALE AT LOWEST: 1
PAIN SCALE: 5
PAIN SCALE AT HIGHEST: 10
QUALITY: ACHING

## 2022-08-16 ENCOUNTER — PHYSICAL THERAPY (OUTPATIENT)
Dept: PHYSICAL THERAPY | Facility: REHABILITATION | Age: 67
End: 2022-08-16
Attending: STUDENT IN AN ORGANIZED HEALTH CARE EDUCATION/TRAINING PROGRAM
Payer: MEDICARE

## 2022-08-16 ENCOUNTER — OFFICE VISIT (OUTPATIENT)
Dept: MEDICAL GROUP | Facility: PHYSICIAN GROUP | Age: 67
End: 2022-08-16
Payer: MEDICARE

## 2022-08-16 VITALS
RESPIRATION RATE: 16 BRPM | BODY MASS INDEX: 23.66 KG/M2 | HEIGHT: 64 IN | HEART RATE: 67 BPM | OXYGEN SATURATION: 98 % | DIASTOLIC BLOOD PRESSURE: 60 MMHG | WEIGHT: 138.6 LBS | SYSTOLIC BLOOD PRESSURE: 128 MMHG | TEMPERATURE: 98.7 F

## 2022-08-16 DIAGNOSIS — R41.3 MEMORY LOSS: ICD-10-CM

## 2022-08-16 DIAGNOSIS — D50.8 OTHER IRON DEFICIENCY ANEMIA: ICD-10-CM

## 2022-08-16 DIAGNOSIS — S46.812A: ICD-10-CM

## 2022-08-16 DIAGNOSIS — H90.0 CONDUCTIVE HEARING LOSS, BILATERAL: ICD-10-CM

## 2022-08-16 DIAGNOSIS — I10 ESSENTIAL HYPERTENSION: ICD-10-CM

## 2022-08-16 DIAGNOSIS — S46.812D STRAIN OF LEFT SUPRASPINATUS MUSCLE, SUBSEQUENT ENCOUNTER: ICD-10-CM

## 2022-08-16 PROCEDURE — 99212 OFFICE O/P EST SF 10 MIN: CPT | Performed by: STUDENT IN AN ORGANIZED HEALTH CARE EDUCATION/TRAINING PROGRAM

## 2022-08-16 PROCEDURE — 97140 MANUAL THERAPY 1/> REGIONS: CPT

## 2022-08-16 PROCEDURE — 97110 THERAPEUTIC EXERCISES: CPT

## 2022-08-16 ASSESSMENT — FIBROSIS 4 INDEX: FIB4 SCORE: 1.38

## 2022-08-16 NOTE — PROGRESS NOTES
CC:  There were no encounter diagnoses.    HISTORY OF THE PRESENT ILLNESS: Patient is a 67 y.o. male. This pleasant patient is here today for follow-up.    1.  Anemia  When we started taking his ferrous sulfate supplementation regularly this past week.  His interval CBC did reveal no anemia but diminished MCV.    2.  Essential hypertension   An interval blood pressure log was returned showing well-controlled blood pressures.  The patient also brings another log today that also reveals well-controlled blood pressure.    3.  Memory loss  Patient is followed up with neuro and has another visit with Dr. Reed this month.    4.  Hearing loss  Patient has followed up with audiology and [facility]    5.  Left shoulder pain  Patient feels that the meloxicam helps a little bit.  He is already have an appointment with PT and has a second appointment scheduled for today.    No problem-specific Assessment & Plan notes found for this encounter.    Current Outpatient Medications Ordered in Epic   Medication Sig Dispense Refill    methylPREDNISolone (MEDROL DOSEPAK) 4 MG Tablet Therapy Pack Follow schedule on package instructions. 21 Tablet 0    Apoaequorin (PREVAGEN EXTRA STRENGTH) 20 MG Cap Take 1 Capsule by mouth every day. 90 Capsule 3    amLODIPine (NORVASC) 2.5 MG Tab Take 1 Tablet by mouth every day. 90 Tablet 3    meloxicam (MOBIC) 7.5 MG Tab Take 1 Tablet by mouth every day. 90 Tablet 3    omeprazole (PRILOSEC) 40 MG delayed-release capsule TAKE 1 CAPSULE BY MOUTH EVERY DAY 90 Capsule 3    sildenafil citrate (VIAGRA) 100 MG tablet Take 100 mg by mouth.      Ascorbic Acid (VITAMIN C) 1000 MG Tab Take  by mouth.      cyanocobalamin (VITAMIN B-12) 100 MCG Tab Take 100 mcg by mouth every day.      ferrous sulfate 325 (65 Fe) MG tablet Take 325 mg by mouth every day.      vitamin D (CHOLECALCIFEROL) 1000 UNIT Tab Take 1,000 Units by mouth every day.       No current Epic-ordered  "facility-administered medications on file.     ROS:   Gen: no fevers/chills, unplanned changes in weight  Eyes: no changes in vision  ENT: no sore throat, no hearing loss, no bloody nose  Pulm: no sob, no cough  CV: no chest pain/pressure, no palpitations  GI: no nausea/vomiting, no diarrhea  : no dysuria/nocturia > once per night  MSk: no myalgias  Skin: no rash  Neuro: no headaches, no numbness/tingling  Heme/Lymph: no easy bruising      Objective:     Exam: /60 (BP Location: Left arm, Patient Position: Sitting, BP Cuff Size: Adult)   Pulse 67   Temp 37.1 °C (98.7 °F) (Temporal)   Resp 16   Ht 1.626 m (5' 4\")   Wt 62.9 kg (138 lb 9.6 oz)   SpO2 98%  Body mass index is 23.79 kg/m².    General: Normal appearing. No distress.  Neurologic: Grossly nonfocal by passive exam.  Skin: Warm and dry.  No obvious lesions.  Musculoskeletal: Normal gait. No extremity cyanosis, clubbing, or edema.  Psych: Normal mood and affect. Alert and oriented x3. Judgment and insight is normal.    A chaperone was offered to the patient during today's exam. Patient declined chaperone.    Labs:   8/1/2022:  -CBC showing no anemia, MCV 80.7    Assessment & Plan:   67 y.o. male with the following -    1.  Anemia  -Chronic, improving.  -Continue regular ferrous sulfate supplementation with vitamin C.    2.  Essential hypertension   -Chronic, stable.  Well treated without side effect.  -Continue amlodipine 2.5 mg daily.    3.  Memory loss  -Patient is following with neurology.    4.  Hearing loss  -Patient is following with audiology.    5.  Left shoulder pain  -Continue Mobic 7.5 mg daily.  -Patient is currently following with physical therapy.    6.  Healthcare maintenance  -In the interval from last visit the patient has obtained his Tdap and first Shingrix vaccine.  He is scheduled for second Shingrix vaccine today.  I have asked him to get his [facility].    Return in about 6 months (around " 2/16/2023).    Please note that this dictation was created using voice recognition software. I have made every reasonable attempt to correct obvious errors, but I expect that there are errors of grammar and possibly content that I did not discover before finalizing the note.    Jose C Kolb PA-C 8/16/2022

## 2022-08-16 NOTE — OP THERAPY DAILY TREATMENT
"  Outpatient Physical Therapy  DAILY TREATMENT     [facility] [address]  [address]  [place] [address]  Phone:  [phone]  Fax:  [phone]    Date: 2022    Patient: Christophe Rizo  YOB: 1955  MRN: [id number]     Time Calculation    Start time: 0947  Stop time: 1028 Time Calculation (min): 41 minutes         Chief Complaint: Shoulder Problem    Visit #: 2    SUBJECTIVE:  Pt reports no new changes. Is compliant with HEP, but still has pain in the L shoulder.    OBJECTIVE:  Current objective measures:   L shoulder:  Abd: 120 PROM  Flex: 148 AAROM             Therapeutic Exercises (CPT 03068):     1. Clasped hand flexion AAROM, x10    2. SB 3 way AAROM, x3'    3. Abd AAROM w/ dowel, x10    4. Wall walks flex/scap, x10 ea, Abd painful - added to HEP    5. Shoulder isos flex/abd/ER, 10x5\" hold      Therapeutic Exercise Summary: HEP: 1x/daily  Shoulder isos, clasped hand flexion AAROM, AAROM abd w/ dowel    Therapeutic Treatments and Modalities:     1. Manual Therapy (CPT 59049), [facility] post/ant/inf glides at 0 and 45, PROM abd/flex    Time-based treatments/modalities:    Physical Therapy Timed Treatment Charges  Manual therapy minutes (CPT 11177): 15 minutes  Therapeutic exercise minutes (CPT 09921): 26 minutes        ASSESSMENT:   Response to treatment: Pt demo slight improvement in ROM. Demo  guarding of L shoulder. Progressed AAROM exercises, pt tolerated exercises well, but required mod VCs to stay in relative pain-free range. HEP progressed to reflect new exercises, handout provided. Pt will benefit from continued skilled PT to address strength and ROM deficits.    PLAN/RECOMMENDATIONS:   Plan for treatment: therapy treatment to continue next visit.  Planned interventions for next visit: continue with current treatment.         "

## 2022-08-16 NOTE — PATIENT INSTRUCTIONS
1) Continue with Iron supplementation. Continue taking this with the vitamin C.    2) [facility].

## 2022-08-18 ENCOUNTER — PHYSICAL THERAPY (OUTPATIENT)
Dept: PHYSICAL THERAPY | Facility: REHABILITATION | Age: 67
End: 2022-08-18
Attending: STUDENT IN AN ORGANIZED HEALTH CARE EDUCATION/TRAINING PROGRAM
Payer: MEDICARE

## 2022-08-18 DIAGNOSIS — S46.812A: ICD-10-CM

## 2022-08-18 PROCEDURE — 97110 THERAPEUTIC EXERCISES: CPT

## 2022-08-18 PROCEDURE — 97140 MANUAL THERAPY 1/> REGIONS: CPT

## 2022-08-18 NOTE — OP THERAPY DAILY TREATMENT
Outpatient Physical Therapy  DAILY TREATMENT     [facility] Physical [address].  [address]  [place] [address]  Phone:  [phone]  Fax:  [phone]    Date: 08/18/2022    Patient: Christophe Rizo  YOB: 1955  MRN: [id number]     Time Calculation    Start time: 1015  Stop time: 1058 Time Calculation (min): 43 minutes         Chief Complaint: Shoulder Injury (Left )    Visit #: 3    SUBJECTIVE:  Patient reports that he has been noticing decreased pain and increased motion since last session. Current pain is 0-1/10. Had appointment at [facility] yesterday (8/17) with DILSHAD Wells and had cortisone injection at 7:15 am. No significant changes in pain since injection as pain levels were already quite low.    OBJECTIVE:  Current objective measures:   Active Range of Motion   Left Shoulder prior to mobilizations  Flexion: 151 degrees   Abduction: 170 degrees   ER at 30* abduction: WNL      Active Range of Motion   Left Shoulder prior to mobilizations  Flexion: 160 degrees   Abduction: 170 degrees      L shoulder MRI 8/10:  Marked fraying and degeneration of the bursal surface of supraspinatus tendon at the critical zone without large partial-thickness or full-thickness tear with a gap.  Reactive subacromial/subdeltoid bursitis.  Intra-articular bicipital tendinosis.           Therapeutic Exercises (CPT 65936):     1. Pt education, Review of MRI results with patient, education on structures involved using shoulder model, answered questions about recovery.    2. Wall walks flex/scap, x 10 each, No pain with scaption today    3. Rows, 2 x 10, Green TB, added to HEP    4. Shoulder extension, 2 x 10, Green TB, added to HEP      Therapeutic Exercise Summary: HEP: 1x/daily  Shoulder isos, clasped hand flexion AAROM, AAROM abd w/ dowel    Added 8/18: rows, shoulder extension     Therapeutic Treatments and Modalities:     1. Manual Therapy (CPT 68554), L GHJ inferior glides gr II-III  to improve flexion and abduction  Time-based treatments/modalities:    Physical Therapy Timed Treatment Charges  Manual therapy minutes (CPT 74648): 13 minutes  Therapeutic exercise minutes (CPT 42996): 30 minutes      ASSESSMENT:   Response to treatment: Patient demonstrated improved ROM today allowing for progression of periscap stabilizations. Large portion of treatment spent providing education on anatomy of shoulder joint due to obtaining MRI results.     PLAN/RECOMMENDATIONS:   Plan for treatment: therapy treatment to continue next visit.  Planned interventions for next visit: continue with current treatment.

## 2022-08-23 ENCOUNTER — PHYSICAL THERAPY (OUTPATIENT)
Dept: PHYSICAL THERAPY | Facility: REHABILITATION | Age: 67
End: 2022-08-23
Attending: STUDENT IN AN ORGANIZED HEALTH CARE EDUCATION/TRAINING PROGRAM
Payer: MEDICARE

## 2022-08-23 DIAGNOSIS — S46.812A: ICD-10-CM

## 2022-08-23 PROCEDURE — 97140 MANUAL THERAPY 1/> REGIONS: CPT

## 2022-08-23 PROCEDURE — 97110 THERAPEUTIC EXERCISES: CPT

## 2022-08-23 NOTE — OP THERAPY DAILY TREATMENT
"  Outpatient Physical Therapy  DAILY TREATMENT     [facility] Physical [address].  [address]  [place] [address]  Phone:  [phone]  Fax:  [phone]    Date: 08/23/2022    Patient: Christophe Rizo  YOB: 1955  MRN: [id number]     Time Calculation    Start time: 1031  Stop time: 1112 Time Calculation (min): 41 minutes         Chief Complaint: Shoulder Injury    Visit #: 4    SUBJECTIVE:  Pt reports no new changes since previous visit. Shoulder feels better overall, has noticed improved ROM.    OBJECTIVE:  Current objective measures:   L shoulder AROM in seated  Flex: 150  Abd: 164          Therapeutic Exercises (CPT 54677):     1. UBE L2-> regressed to L1, x4' (fwd/bwd)    2. HADDER box iso w/ WTB, 2x5x5\" hold, Added to HEP    3. Rows/ext w/ GTB, 10x5\" hold    4. Flex/abd AROM to 90, x10, Added to HEP    5. Ecc bicep curl w/ GTB, 2x10 ea, Added to HEP      Therapeutic Exercise Summary: HEP: 1x/daily  Shoulder isos, clasped hand flexion AAROM, AAROM abd w/ dowel, rows, shoulder extension       Therapeutic Treatments and Modalities:     1. Manual Therapy (CPT 65232), L GHJ inf/post/ant mobs grade 3-4 to improve flexion and abduction  Time-based treatments/modalities:    Physical Therapy Timed Treatment Charges  Manual therapy minutes (CPT 39964): 11 minutes  Therapeutic exercise minutes (CPT 94366): 30 minutes      ASSESSMENT:   Response to treatment: Pt demo improved AROM overall. Progressed to AROM exercises over AAROM. Pt tolerated exercises well, some discomfort >90 degrees, Vcs to stay within pain-free range. Progressed HEP with AROM exercises, handout provided.    PLAN/RECOMMENDATIONS:   Plan for treatment: therapy treatment to continue next visit.  Planned interventions for next visit: continue with current treatment.         "

## 2022-08-25 ENCOUNTER — PHYSICAL THERAPY (OUTPATIENT)
Dept: PHYSICAL THERAPY | Facility: REHABILITATION | Age: 67
End: 2022-08-25
Attending: STUDENT IN AN ORGANIZED HEALTH CARE EDUCATION/TRAINING PROGRAM
Payer: MEDICARE

## 2022-08-25 DIAGNOSIS — S46.812A: ICD-10-CM

## 2022-08-25 PROCEDURE — 97110 THERAPEUTIC EXERCISES: CPT

## 2022-08-25 PROCEDURE — 97140 MANUAL THERAPY 1/> REGIONS: CPT

## 2022-08-25 NOTE — OP THERAPY DAILY TREATMENT
"  Outpatient Physical Therapy  DAILY TREATMENT     [facility] Physical [address].  [address]  [place] [address]  Phone:  [phone]  Fax:  [phone]    Date: 08/25/2022    Patient: Christophe Rizo  YOB: 1955  MRN: [id number]     Time Calculation    Start time: 1031  Stop time: 1113 Time Calculation (min): 42 minutes         Chief Complaint: Shoulder Injury    Visit #: 5    SUBJECTIVE:  Pt reports L shoulder feels better overall, but started having increased pain and symptoms when performing AROM abd exercise    OBJECTIVE:  Current objective measures:             Therapeutic Exercises (CPT 31089):     1. UBE L1, x4' (fwd/bwd)    2. HADDER box w/ OTB, x6, 10x5\" hold    3. Cable rows 30#, 2x10, stopped 2 set, rep 4 d/t discomfort    4. Cable ext 15#, 2x10    6. TARIQ w/ OTB, x10    7. Ts w/ OTB, painful    8. Ecc bicep curl w/ GTB, 2x10 ea, x10 w/ 8#    9. Flex/abd AROM to 90, Held      Therapeutic Exercise Summary: HEP: 1x/daily  Shoulder isos, clasped hand flexion AAROM, AAROM abd w/ dowel, rows, shoulder extension,       Therapeutic Treatments and Modalities:     1. Manual Therapy (CPT 27142), L GHJ inf/post/ant mobs grade 3-4 to improve flexion and abduction, PROM flex w/ contract relax  Time-based treatments/modalities:    Physical Therapy Timed Treatment Charges  Manual therapy minutes (CPT 73461): 10 minutes  Therapeutic exercise minutes (CPT 37202): 32 minutes        ASSESSMENT:   Response to treatment: Continued focus on parascapular and RTC strengthening. Pt tolerated posterior/infra-focused exercises well. Some irritability with rows that persisted ~5 min. Demo pain and discomfort with AROM abd >70 degrees, mod VCs to stay w/in pain-free range. Pt ed conducted regarding activity tolerance, recommend completing exercises w/in pain-free range and to stay w/in tolerance levels to avoid inflammatory response. Pt gave verbal understanding.    PLAN/RECOMMENDATIONS: "   Plan for treatment: therapy treatment to continue next visit.  Planned interventions for next visit: continue with current treatment.

## 2022-08-30 ENCOUNTER — PHYSICAL THERAPY (OUTPATIENT)
Dept: PHYSICAL THERAPY | Facility: REHABILITATION | Age: 67
End: 2022-08-30
Attending: STUDENT IN AN ORGANIZED HEALTH CARE EDUCATION/TRAINING PROGRAM
Payer: MEDICARE

## 2022-08-30 DIAGNOSIS — S46.812A: ICD-10-CM

## 2022-08-30 PROCEDURE — 97140 MANUAL THERAPY 1/> REGIONS: CPT

## 2022-08-30 PROCEDURE — 97110 THERAPEUTIC EXERCISES: CPT

## 2022-08-30 NOTE — OP THERAPY DAILY TREATMENT
"  Outpatient Physical Therapy  DAILY TREATMENT     [facility] Physical [address].  [address]  [place] [address]  Phone:  [phone]  Fax:  [phone]    Date: 08/30/2022    Patient: Christophe Rizo  YOB: 1955  MRN: [id number]     Time Calculation    Start time: 1031  Stop time: 1112 Time Calculation (min): 41 minutes         Chief Complaint: Shoulder Injury    Visit #: 6    SUBJECTIVE:  Pt reports having increased pain in the L shoulder that started over the weekend. Was not able to lift arm, but has started to feel better in the last few days.     OBJECTIVE:  Current objective measures:   L shoulder ROM:  Flex: 148 AROM, 164 PROM  Abd: 99 AROM, 172 PROM          Therapeutic Exercises (CPT 92718):     1. UBE L1, Not today    2. HADDER box w/ OTB, 2x6    3. TARIQ w/ OTB, x10    4. Ecc bicep curl 10#, x10    5. Rows/ext w/ PTB, x15 ea    11. Clasped hand flex AAROM, 10x5\" hold      Therapeutic Exercise Summary: HEP: 1x/daily  Shoulder isos, clasped hand flexion AAROM, AAROM abd w/ dowel, rows, shoulder extension,       Therapeutic Treatments and Modalities:     1. Manual Therapy (CPT 32970), L GHJ inf/post/ant mobs grade 3-4 to improve flexion and abduction, PROM flex/abd  Time-based treatments/modalities:    Physical Therapy Timed Treatment Charges  Manual therapy minutes (CPT 54724): 10 minutes  Therapeutic exercise minutes (CPT 78976): 31 minutes          ASSESSMENT:   Response to treatment: Pt demo overall decreased AROM, ~40% decreased abduction, likely d/t inflammatory response of LHB d/t increased activity over the weekend. Continued focus on RTC strengthening w/in pain-free ranges. Pt tolerated exercises well. Pt ed conducted regarding inflammatory/healing process, pt gave verbal understanding. Instructed to continue isometric HEP exercises d/t increased symptoms, pt gave verbal agreement/understanding.    PLAN/RECOMMENDATIONS:   Plan for treatment: therapy " treatment to continue next visit.  Planned interventions for next visit: continue with current treatment.

## 2022-09-01 ENCOUNTER — PHYSICAL THERAPY (OUTPATIENT)
Dept: PHYSICAL THERAPY | Facility: REHABILITATION | Age: 67
End: 2022-09-01
Attending: STUDENT IN AN ORGANIZED HEALTH CARE EDUCATION/TRAINING PROGRAM
Payer: MEDICARE

## 2022-09-01 DIAGNOSIS — S46.812A: ICD-10-CM

## 2022-09-01 PROCEDURE — 97110 THERAPEUTIC EXERCISES: CPT

## 2022-09-01 PROCEDURE — 97140 MANUAL THERAPY 1/> REGIONS: CPT

## 2022-09-01 NOTE — OP THERAPY DAILY TREATMENT
"  Outpatient Physical Therapy  DAILY TREATMENT     [facility] Physical [address].  [address]  [place] [address]  Phone:  [phone]  Fax:  [phone]    Date: 09/01/2022    Patient: Christophe Rizo  YOB: 1955  MRN: [id number]     Time Calculation    Start time: 1031  Stop time: 1115 Time Calculation (min): 44 minutes         Chief Complaint: Shoulder Injury    Visit #: 7    SUBJECTIVE:  Pt reports L shoulder feels better today compared to previous visit. Is also feeling better overall. Is using \"joint flex\" cream that helps with pain. Uses every night before bed, is able to sleep uninterrupted.     OBJECTIVE:  Current objective measures:   L shoulder AROM (tested in seated)  Flex: 159   Ext: 164          Therapeutic Exercises (CPT 57408):     1. UBE L3, x4' fwd/bwd    2. HADDER box w/ OTB, x10    3. TARIQ w/ PTB, x10, Added to HEP    4. Ecc bicep curl 10#, x10    5. Rows/ext w/ PTB, Not today    6. AROM flex/scap 2#, 2x10 ea, Stopped scap reps @ 4 d/t high fatigue    7. Elevated push up, 2x10, Added to HEP    8. Wall ball circles, x15 cw/ccw      Therapeutic Exercise Summary: HEP: 1x/daily  HADDER box, TARIQ, rows/ext, AROM flex/scap, elevated push up  Shoulder isos, clasped hand flexion AAROM, AAROM abd w/ dowel, rows, shoulder extension,       Therapeutic Treatments and Modalities:     1. Manual Therapy (CPT 00801), L GHJ inf/post/ant mobs grade 3-4 to improve flexion and abduction, PROM ER/IR  Time-based treatments/modalities:    Physical Therapy Timed Treatment Charges  Manual therapy minutes (CPT 46208): 14 minutes  Therapeutic exercise minutes (CPT 92036): 30 minutes          ASSESSMENT:   Response to treatment: Pt demo improved global AROM, but continues to demo decreased endurance. Unable to complete flex/scap AROM > 14 reps total. Reported high fatigue at end of session. Recommend light duty with shoulder for remainder of day to rest. Pt gave verbal agreement. " Progressed HEP to reflect AROM exercises and push ups, handout provided.    PLAN/RECOMMENDATIONS:   Plan for treatment: therapy treatment to continue next visit.  Planned interventions for next visit: continue with current treatment.

## 2022-09-06 ENCOUNTER — PHYSICAL THERAPY (OUTPATIENT)
Dept: PHYSICAL THERAPY | Facility: REHABILITATION | Age: 67
End: 2022-09-06
Attending: STUDENT IN AN ORGANIZED HEALTH CARE EDUCATION/TRAINING PROGRAM
Payer: MEDICARE

## 2022-09-06 DIAGNOSIS — S46.812A: ICD-10-CM

## 2022-09-06 PROCEDURE — 97110 THERAPEUTIC EXERCISES: CPT

## 2022-09-06 PROCEDURE — 97140 MANUAL THERAPY 1/> REGIONS: CPT

## 2022-09-06 NOTE — OP THERAPY DAILY TREATMENT
Outpatient Physical Therapy  DAILY TREATMENT     [facility] Physical [address].  [address]  [place] [address]  Phone:  [phone]  Fax:  [phone]    Date: 09/06/2022    Patient: Christophe Rizo  YOB: 1955  MRN: [id number]     Time Calculation    Start time: 1030  Stop time: 1112 Time Calculation (min): 42 minutes         Chief Complaint: Shoulder Injury    Visit #: 8    SUBJECTIVE:  Pt reports having difficulty with scaption AROM exercises, unable to perform d/t pain. Is using 5# at home.    OBJECTIVE:  Current objective measures:           Therapeutic Exercises (CPT 05026):     1. UBE L3, x4' fwd/bwd    2. HADDER box w/ OTB, x10    3. TARIQ w/ PTB, not today    4. Ecc bicep curl 10#, not today    5. Cable rows/ext 25/15#, 2x10 ea    6. SB wall walk flex, x20    7. SB wall push up, 2x10    10. Sidelying abd, x10    11. Sidelying flex w/ ER, x10      Therapeutic Exercise Summary: HEP: 1x/daily  HADDER box, TARIQ, rows/ext, AROM flex/scap, elevated push up  Shoulder isos, clasped hand flexion AAROM, AAROM abd w/ dowel, rows, shoulder extension,       Therapeutic Treatments and Modalities:     1. Manual Therapy (CPT 01423), L GHJ inf/post/ant mobs grade 3-4 to improve flexion and abduction, lat/subscap tach and stretch  Time-based treatments/modalities:    Physical Therapy Timed Treatment Charges  Manual therapy minutes (CPT 64136): 12 minutes  Therapeutic exercise minutes (CPT 13014): 30 minutes          ASSESSMENT:   Response to treatment: Regressed abd AROM to sidelying d/t decreased irritability. Pt tolerated exercises well and without pain. Pt education conducted regarding dosing of exercises, and to not use 5# for HEP exercises. Focus on REP 4-6/10 vs counting repetitions. Pt gave verbal understanding. Demo decreased endurance, with reports of fatigue >~6 reps of exercises.    PLAN/RECOMMENDATIONS:   Plan for treatment: therapy treatment to continue next  visit.  Planned interventions for next visit: continue with current treatment.

## 2022-09-08 ENCOUNTER — PHYSICAL THERAPY (OUTPATIENT)
Dept: PHYSICAL THERAPY | Facility: REHABILITATION | Age: 67
End: 2022-09-08
Attending: STUDENT IN AN ORGANIZED HEALTH CARE EDUCATION/TRAINING PROGRAM
Payer: MEDICARE

## 2022-09-08 DIAGNOSIS — S46.812A: ICD-10-CM

## 2022-09-08 PROCEDURE — 97140 MANUAL THERAPY 1/> REGIONS: CPT

## 2022-09-08 PROCEDURE — 97110 THERAPEUTIC EXERCISES: CPT

## 2022-09-08 NOTE — OP THERAPY DAILY TREATMENT
Outpatient Physical Therapy  DAILY TREATMENT     [facility] Physical [address]  [address]  [place] [address]  Phone:  [phone]  Fax:  [phone]    Date: 09/08/2022    Patient: Christophe Rizo  YOB: 1955  MRN: [id number]     Time Calculation    Start time: 1030  Stop time: 1111 Time Calculation (min): 41 minutes         Chief Complaint: Shoulder Injury    Visit #: 9    SUBJECTIVE:  Pt reports L shoulder feels ~90% better than initial eval, but still has difficulty reaching out to the side.     OBJECTIVE:  Current objective measures:           Therapeutic Exercises (CPT 13161):     1. UBE L3, x4' fwd/bwd    2. HADDER box w/ OTB, to fatigue (x10), Standing    3. Wall slide w/ lift off, x10, Added to HEP    4. Ecc bicep curl 10#, x10    5. Cable rows/ext 25/15#, Not today    6. SB wall walk flex, x10    7. SB wall push up w/ 65 cm, 2x10    10. Sidelying abd, x10    11. Sidelying flex w/ ER, x5      Therapeutic Exercise Summary: HEP: 1x/daily  HADDER box, TARIQ, rows/ext, AROM flex/scap, elevated push up  Shoulder isos, clasped hand flexion AAROM, AAROM abd w/ dowel, rows, shoulder extension,       Therapeutic Treatments and Modalities:     1. Manual Therapy (CPT 95585), L GHJ inf/post/ant mobs grade 3-4 to improve flexion and abduction  Time-based treatments/modalities:    Physical Therapy Timed Treatment Charges  Manual therapy minutes (CPT 75898): 10 minutes  Therapeutic exercise minutes (CPT 24966): 31 minutes        ASSESSMENT:   Response to treatment: Progressed parascapular and RTC strengthening exercises. Pt demo improved endurance and activity tolerance. Continues to have difficulty and increased pain with abduction AROM, able to tolerate in gravity minimized position. VCs to stay in pain-free range.    PLAN/RECOMMENDATIONS:   Plan for treatment: therapy treatment to continue next visit.  Planned interventions for next visit: continue with current  treatment.

## 2022-09-13 ENCOUNTER — PHYSICAL THERAPY (OUTPATIENT)
Dept: PHYSICAL THERAPY | Facility: REHABILITATION | Age: 67
End: 2022-09-13
Attending: STUDENT IN AN ORGANIZED HEALTH CARE EDUCATION/TRAINING PROGRAM
Payer: MEDICARE

## 2022-09-13 DIAGNOSIS — S46.812A: ICD-10-CM

## 2022-09-13 PROCEDURE — 97110 THERAPEUTIC EXERCISES: CPT

## 2022-09-13 PROCEDURE — 97140 MANUAL THERAPY 1/> REGIONS: CPT

## 2022-09-13 NOTE — OP THERAPY DAILY TREATMENT
Outpatient Physical Therapy  DAILY TREATMENT     [facility] Physical [address].  [address]  [place] [address]  Phone:  [phone]  Fax:  [phone]    Date: 09/13/2022    Patient: Christophe Rizo  YOB: 1955  MRN: [id number]     Time Calculation    Start time: 1032  Stop time: 1112 Time Calculation (min): 40 minutes         Chief Complaint: Shoulder Problem    Visit #: 10    SUBJECTIVE:  Pt reports L shoulder is feeling better everyday, would rate 98% recovery. Still has some pain (1-2/10) with heavy activities, but is manageable.    Short Term Goals:   1. Pt will be compliant with HEP 3-5x/ week for increased ROM, strength, reduction in pain symptoms - met  2. 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 PT will be able to lift 20# at waist level in order to carry groceries - met     Short term goal time span:  4-6 weeks      Long Term Goals:    1. Pt will demo shoulder ROM WFL in order to increase participation with OH movements - met  2. Pt will demo L shoulder strength 4+/5 or greater from improved stability - met  3. Pt will report ability to sleep through the night uninterrupted by pain - met  4. Pt will report ability to complete daily household chores with pain no greater than 2/10 - met  Long term goal time span:  6-8 weeks    OBJECTIVE:  Current objective measures:   L shoulder AROM  Flex: 170  Abd: 172  BTH: T1  BTB: T8    L shoulder strength:  Flex: 5/5  Ext: 5/5  Abd: 4+/5  ER: 4/5  IR: 5/5          Therapeutic Exercises (CPT 52367):     1. UBE L3, x4' fwd/bwd    2. HADDER box w/ OTB, to fatigue (x), Standing    3. Wall slide w/ lift off, x10    4. Sidelying abd, x10    5. Sidelying flex w/ ER, x10    6. SB wall push ups, x10    7. SB wall walks, x10    8. TARIQ w/ GTB, x10      Therapeutic Exercise Summary: HEP: 1x/daily  HADDER box, TARQI, rows/ext, AROM flex/scap, elevated push up  Shoulder isos,  clasped hand flexion AAROM, AAROM abd w/ dowel, rows, shoulder extension,       Therapeutic Treatments and Modalities:     1. Manual Therapy (CPT 23133), L GHJ inf/post/ant mobs grade 3-4 to improve flexion and abduction  Time-based treatments/modalities:    Physical Therapy Timed Treatment Charges  Manual therapy minutes (CPT 33981): 12 minutes  Therapeutic exercise minutes (CPT 08929): 28 minutes        ASSESSMENT:   Response to treatment: See DC note    PLAN/RECOMMENDATIONS:   Plan for treatment: discharge patient due to accomplished goals.

## 2022-09-13 NOTE — OP THERAPY DISCHARGE SUMMARY
Outpatient Physical Therapy  DISCHARGE SUMMARY NOTE      [facility] Physical Therapy [address]  [address]  [place] [address]  Phone:  [phone]  Fax:  [phone]    Date of Visit: 09/13/2022    Patient: Christophe Rizo  YOB: 1955  MRN: [id number]     Referring Provider: Jose C Kolb P.A.-C.  [address]  [place] [address]   Referring Diagnosis Strain of other muscles, fascia and tendons at shoulder and upper arm level, left arm, initial encounter [S46.308N]         Functional Assessment Used        Your patient is being discharged from Physical Therapy with the following comments:   Goals met    Comments:  Pt has attended 10 visits from 8/12/22 to 9/13/22. Pt has met all functional goals, demo full independence and proper mechanics with all HEP exercises.    Recommendations:  Continue independently with HEP    Grace Chau, PT, DPT    Date: 9/13/2022

## 2022-09-28 DIAGNOSIS — I10 ESSENTIAL HYPERTENSION: ICD-10-CM

## 2022-09-29 RX ORDER — AMLODIPINE BESYLATE 2.5 MG/1
2.5 TABLET ORAL DAILY
Qty: 90 TABLET | Refills: 0 | Status: SHIPPED | OUTPATIENT
Start: 2022-09-29 | End: 2023-03-31 | Stop reason: SDUPTHER

## 2022-11-04 ENCOUNTER — HOSPITAL ENCOUNTER (OUTPATIENT)
Dept: LAB | Facility: MEDICAL CENTER | Age: 67
End: 2022-11-04
Attending: UROLOGY
Payer: MEDICARE

## 2022-11-04 LAB — PSA SERPL-MCNC: 0.03 NG/ML (ref 0–4)

## 2022-11-04 PROCEDURE — 84153 ASSAY OF PSA TOTAL: CPT

## 2022-11-04 PROCEDURE — 36415 COLL VENOUS BLD VENIPUNCTURE: CPT

## 2022-11-10 ENCOUNTER — PATIENT MESSAGE (OUTPATIENT)
Dept: HEALTH INFORMATION MANAGEMENT | Facility: OTHER | Age: 67
End: 2022-11-10

## 2023-03-03 ENCOUNTER — OFFICE VISIT (OUTPATIENT)
Dept: MEDICAL GROUP | Facility: PHYSICIAN GROUP | Age: 68
End: 2023-03-03
Payer: MEDICARE

## 2023-03-03 VITALS
OXYGEN SATURATION: 97 % | TEMPERATURE: 98 F | HEIGHT: 64 IN | SYSTOLIC BLOOD PRESSURE: 128 MMHG | HEART RATE: 64 BPM | WEIGHT: 134.6 LBS | BODY MASS INDEX: 22.98 KG/M2 | DIASTOLIC BLOOD PRESSURE: 66 MMHG

## 2023-03-03 DIAGNOSIS — I10 ESSENTIAL HYPERTENSION: ICD-10-CM

## 2023-03-03 DIAGNOSIS — D50.8 OTHER IRON DEFICIENCY ANEMIA: ICD-10-CM

## 2023-03-03 DIAGNOSIS — Z23 NEED FOR VACCINATION: ICD-10-CM

## 2023-03-03 DIAGNOSIS — R41.3 MEMORY LOSS: ICD-10-CM

## 2023-03-03 PROCEDURE — G0439 PPPS, SUBSEQ VISIT: HCPCS | Performed by: STUDENT IN AN ORGANIZED HEALTH CARE EDUCATION/TRAINING PROGRAM

## 2023-03-03 PROCEDURE — G0008 ADMIN INFLUENZA VIRUS VAC: HCPCS | Performed by: STUDENT IN AN ORGANIZED HEALTH CARE EDUCATION/TRAINING PROGRAM

## 2023-03-03 PROCEDURE — 90662 IIV NO PRSV INCREASED AG IM: CPT | Performed by: STUDENT IN AN ORGANIZED HEALTH CARE EDUCATION/TRAINING PROGRAM

## 2023-03-03 ASSESSMENT — FIBROSIS 4 INDEX: FIB4 SCORE: 1.38

## 2023-03-03 ASSESSMENT — PATIENT HEALTH QUESTIONNAIRE - PHQ9: CLINICAL INTERPRETATION OF PHQ2 SCORE: 0

## 2023-03-03 NOTE — PROGRESS NOTES
Chief Complaint   Patient presents with    Annual Exam     HPI:  Christophe is a 67 y.o. here for Medicare Annual Wellness Visit    Patient Active Problem List    Diagnosis Date Noted    Conductive hearing loss, bilateral 05/13/2022    Strain of left supraspinatus muscle 05/13/2022    Chronic laryngitis 05/14/2021    Chronic cough 03/10/2021    Memory loss 08/26/2020    Essential hypertension 05/22/2020    Multiple joint pain 05/22/2020    History of resection of small bowel 08/15/2019    Iron deficiency anemia 12/16/2015    Episodic lightheadedness 12/11/2015    History of prostate cancer 03/12/2015       Current Outpatient Medications   Medication Sig Dispense Refill    amLODIPine (NORVASC) 2.5 MG Tab Take 1 Tablet by mouth every day. 90 Tablet 0    Ascorbic Acid (VITAMIN C) 1000 MG Tab Take  by mouth.      ferrous sulfate 325 (65 Fe) MG tablet Take 325 mg by mouth every day.      vitamin D (CHOLECALCIFEROL) 1000 UNIT Tab Take 1,000 Units by mouth every day. (Patient not taking: Reported on 3/3/2023)       No current facility-administered medications for this visit.      Patient is taking medications as noted in medication list.  Current supplements as per medication list.     Allergies: Asa [aspirin]    Current social contact/activities:  Part of choir.    Is patient current with immunizations? Yes.    He  reports that he has never smoked. He has never used smokeless tobacco. He reports current alcohol use. He reports that he does not use drugs.  Counseling given: Not Answered  Tobacco comments: quit 1976,<1 pckyr      ROS:    Gait: Uses no assistive device   Ostomy: No   Other tubes: No   Amputations: No   Chronic oxygen use No   Last eye exam  3/2022  Wears hearing aids: Yes   : Reports urinary leakage during the last 6 months that has not interfered at all with their daily activities or sleep.    Screening:    Depression Screening  Little interest or pleasure in doing things?  0 - not at all  Feeling down,  depressed, or hopeless? 0 - not at all  Patient Health Questionnaire Score: 0    If depressive symptoms identified deferred to follow up visit unless specifically addressed in assessment and plan.    Interpretation of PHQ-9 Total Score   Score Severity   1-4 No Depression   5-9 Mild Depression   10-14 Moderate Depression   15-19 Moderately Severe Depression   20-27 Severe Depression    Screening for Cognitive Impairment  Three Minute Recall (daughter, heaven, mountain)   3/3    Bala clock face with all 12 numbers and set the hands to show 10 past 11.   Yes    If cognitive concerns identified, deferred for follow up unless specifically addressed in assessment and plan.    Fall Risk Assessment  Has the patient had two or more falls in the last year or any fall with injury in the last year?  No  If fall risk identified, deferred for follow up unless specifically addressed in assessment and plan.    Safety Assessment  Throw rugs on floor.   Y  Handrails on all stairs.   Y  Good lighting in all hallways.   Y  Difficulty hearing.   Y  Patient counseled about all safety risks that were identified.    Functional Assessment ADLs  Are there any barriers preventing you from cooking for yourself or meeting nutritional needs?   .N    Are there any barriers preventing you from driving safely or obtaining transportation?   .N    Are there any barriers preventing you from using a telephone or calling for help?   N    Are there any barriers preventing you from shopping?   .N   Are there any barriers preventing you from taking care of your own finances?   .N    Are there any barriers preventing you from managing your medications?   .N    Are there any barriers preventing you from showering, bathing or dressing yourself?   .N    Are you currently engaging in any exercise or physical activity?   Y    What is your perception of your health?   Fair    Advance Care Planning  Do you have an Advance Directive, Living Will, Durable Power of  , or POLST?  N               Health Maintenance Summary            Overdue - Annual Wellness Visit (Every 366 Days) Overdue - never done      No completion history exists for this topic.              Overdue - IMM INFLUENZA (1) Overdue since 9/1/2022 11/07/2014  Imm Admin: Influenza Vaccine Quad Inj (Pf)              COLORECTAL CANCER SCREENING (COLONOSCOPY - Every 10 Years) Next due on 9/14/2027 09/14/2017  REFERRAL TO GI FOR COLONOSCOPY    07/26/2016  OCCULT BLOOD FECES IMMUNOASSAY    02/24/2016  AMB REFERRAL TO GI FOR COLONOSCOPY    12/15/2015  OCCULT BLOOD,FECAL,IMMUNOASSAY    05/12/2010  AMB REFERRAL TO GI FOR COLONOSCOPY              IMM DTaP/Tdap/Td Vaccine (2 - Td or Tdap) Next due on 5/17/2032 05/17/2022  Outside Immunization: Tdap              HEPATITIS C SCREENING  Completed      07/20/2013  HEP C VIRUS ANTIBODY              IMM PNEUMOCOCCAL VACCINE: 65+ Years (Series Information) Completed      05/14/2021  Imm Admin: Pneumococcal polysaccharide vaccine (PPSV-23)    05/22/2020  Imm Admin: Pneumococcal Conjugate Vaccine (Prevnar/PCV-13)              IMM ZOSTER VACCINES (Series Information) Completed      09/14/2022  Outside Immunization: Zoster Lincoln (Shingrix)    05/17/2022  Outside Immunization: Zoster Lincoln (Shingrix)              COVID-19 Vaccine (Series Information) Completed      09/26/2022  Imm Admin: PFIZER BIVALENT BOOSTER SARS-COV-2 VACCINE (12+)    10/12/2021  Imm Admin: PFIZER PURPLE CAP SARS-COV-2 VACCINATION (12+)    02/02/2021  Imm Admin: PFIZER PURPLE CAP SARS-COV-2 VACCINATION (12+)    01/12/2021  Imm Admin: PFIZER PURPLE CAP SARS-COV-2 VACCINATION (12+)              IMM HEP B VACCINE (Series Information) Aged Out      No completion history exists for this topic.              IMM MENINGOCOCCAL ACWY VACCINE (Series Information) Aged Out      No completion history exists for this topic.                    Patient Care Team:  Jose C Kolb P.A.-C. as PCP - General  "(Physician Assistant)  Mc Serra M.D. (Urology)  Hector Rao M.D. (Gastroenterology)    Social History     Tobacco Use    Smoking status: Never    Smokeless tobacco: Never    Tobacco comments:     quit 1976,<1 pckyr   Vaping Use    Vaping Use: Never used   Substance Use Topics    Alcohol use: Yes     Comment: weekend    Drug use: No     Family History   Problem Relation Age of Onset    Cancer Father         prostate, age 90    Stroke Mother     Diabetes Neg Hx     Heart Disease Neg Hx      He  has a past medical history of Cancer (HCC) (2013) and Essential hypertension (5/22/2020).    He has no past medical history of Hyperlipidemia or Thyroid disease.   Past Surgical History:   Procedure Laterality Date    ELBOW EXPLORATION  2005    cubital tunnel    BOWEL RESECTION  1996    volvulus, small intestine    COLONOSCOPY  1996, 2009    neg    PROSTATECTOMY, RADIAL Bilateral      Exam:   /66 (BP Location: Left arm, Patient Position: Sitting, BP Cuff Size: Adult)   Pulse 64   Temp 36.7 °C (98 °F) (Temporal)   Ht 1.626 m (5' 4\")   Wt 61.1 kg (134 lb 9.6 oz)   SpO2 97%  Body mass index is 23.1 kg/m².    Hearing poor.    Dentition fair  Alert, oriented in no acute distress.  Eye contact is good, speech goal directed, affect calm    Assessment and Plan. The following treatment and monitoring plan is recommended:      1. Memory loss  Patient continues to have difficulties with memory loss.  He normally does not manage his own finances, his wife does this for him.  He has inquired about medications that might aid him with delaying his memory loss and wishes a referral to neurology.  -Refer to neurology, memory clinic.    2. Other iron deficiency anemia  Patient continues to take his ferrous sulfate 325 daily.  He takes this with 1000 mg of vitamin C.  No difficulties with constipation.    3. Essential hypertension  Amlodipine 2.5 mg daily.  Patient reports no dependent edema.    Services suggested: No " services needed at this time  Health Care Screening recommendations as per orders if indicated.  Referrals offered: PT/OT/Nutrition counseling/Behavioral Health/Smoking cessation as per orders if indicated.    Discussion today about general wellness and lifestyle habits:    Prevent falls and reduce trip hazards; Cautioned about securing or removing rugs.  Have a working fire alarm and carbon monoxide detector;   Engage in regular physical activity and social activities     Follow-up: No follow-ups on file.

## 2023-03-08 ENCOUNTER — TELEPHONE (OUTPATIENT)
Dept: HEALTH INFORMATION MANAGEMENT | Facility: OTHER | Age: 68
End: 2023-03-08

## 2023-03-10 ENCOUNTER — APPOINTMENT (OUTPATIENT)
Dept: LAB | Facility: MEDICAL CENTER | Age: 68
End: 2023-03-10
Payer: MEDICARE

## 2023-03-10 ENCOUNTER — OFFICE VISIT (OUTPATIENT)
Dept: NEUROLOGY | Facility: MEDICAL CENTER | Age: 68
End: 2023-03-10
Attending: PSYCHIATRY & NEUROLOGY
Payer: MEDICARE

## 2023-03-10 VITALS
HEART RATE: 93 BPM | DIASTOLIC BLOOD PRESSURE: 86 MMHG | HEIGHT: 64 IN | BODY MASS INDEX: 22.21 KG/M2 | OXYGEN SATURATION: 98 % | RESPIRATION RATE: 18 BRPM | TEMPERATURE: 98.7 F | WEIGHT: 130.07 LBS | SYSTOLIC BLOOD PRESSURE: 128 MMHG

## 2023-03-10 DIAGNOSIS — G94 OTHER DISORDERS OF BRAIN IN DISEASES CLASSIFIED ELSEWHERE: ICD-10-CM

## 2023-03-10 DIAGNOSIS — G31.84 MILD COGNITIVE IMPAIRMENT WITH MEMORY LOSS: ICD-10-CM

## 2023-03-10 PROCEDURE — 99213 OFFICE O/P EST LOW 20 MIN: CPT | Performed by: PSYCHIATRY & NEUROLOGY

## 2023-03-10 PROCEDURE — 99211 OFF/OP EST MAY X REQ PHY/QHP: CPT | Performed by: PSYCHIATRY & NEUROLOGY

## 2023-03-10 ASSESSMENT — FIBROSIS 4 INDEX: FIB4 SCORE: 1.38

## 2023-03-10 ASSESSMENT — PATIENT HEALTH QUESTIONNAIRE - PHQ9: CLINICAL INTERPRETATION OF PHQ2 SCORE: 0

## 2023-03-10 NOTE — PROGRESS NOTES
Neuro follow up:    Here alone.    Reason: subjective memory impairment for 2.5 to 3 years or so    Last visit with me in 3/2021    Christophe Rizo 65 y.o. right handed [facility] with HTN  who graduated with a bachelor of fine arts in the [employer] and moved to [place] in 1976. He was a  and in 1982 joined the [employer] and left the [employer] in 2004.        He has noticed for the last 1-2 years  that he will periodically look for an object anywhere in his house and once he gets to that location will think  Why he is going there or sometimes a specific item in house. He has very infrequently had difficulty recalling a street name; he has never gotten lost in a familiar or unfamiliar situation.  The above issue(s) have not rapidly progressed or become more noticeable to him or via his own comments about his wife's view of his subjective symptoms.    He is aware of periodic difficulty recognizing when he met someone in the prior weeks.     He is recognizing that he may infrequently forget a personal item about once a week.    He adamantly denies any problems getting lost or having any accidents or incidents when driving in the last 6 months.    I reviewed his overall ADL's today and he been able to take care of own med(s), his finances, his self hygiene and he is very confident in his ability to take care of himself.     He has not noticed any changes in his walking,balance,abilty to read or write in the recent months.     There is no  history of seizure(s) or seizure like episodes known to have occurred nor described to have happened at any time in his life.     There are no  history of concussion(s).     No history of TIA or stroke like events known to have occurred.    There are no involuntary movements, progressive gait-balance difficulties, falls or near falls in the recent months.     No REM Sleep Behavioral symptoms or alison insomnia (averaging 8 hours of sleep most night awakening around 6  am)- will take a brief nap for 1 hour which he has done for many years since he retired in 2004.    ROS: Grandfather (mother's side)-  Late onset Dementia (onset in early 70's)          Patient Active Problem List    Diagnosis Date Noted    Conductive hearing loss, bilateral 05/13/2022    Strain of left supraspinatus muscle 05/13/2022    Chronic laryngitis 05/14/2021    Chronic cough 03/10/2021    Memory loss 08/26/2020    Essential hypertension 05/22/2020    Multiple joint pain 05/22/2020    History of resection of small bowel 08/15/2019    Iron deficiency anemia 12/16/2015    Episodic lightheadedness 12/11/2015    History of prostate cancer 03/12/2015       Past medical history:   Past Medical History:   Diagnosis Date    Cancer (HCC) 2013    prostate    Essential hypertension 5/22/2020       Past surgical history:   Past Surgical History:   Procedure Laterality Date    ELBOW EXPLORATION  2005    cubital tunnel    BOWEL RESECTION  1996    volvulus, small intestine    COLONOSCOPY  1996, 2009    neg    PROSTATECTOMY, RADIAL Bilateral          Social history:   Social History     Socioeconomic History    Marital status:      Spouse name: Not on file    Number of children: Not on file    Years of education: Not on file    Highest education level: Not on file   Occupational History    Not on file   Tobacco Use    Smoking status: Never    Smokeless tobacco: Never    Tobacco comments:     quit 1976,<1 pckyr   Vaping Use    Vaping Use: Never used   Substance and Sexual Activity    Alcohol use: Yes     Comment: weekend    Drug use: No    Sexual activity: Yes     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: , manufacturing   Other Topics Concern    Not on file   Social History Narrative    Not on file     Social Determinants of Health     Financial Resource Strain: Not on file   Food Insecurity: Not on file   Transportation Needs: Not on file   Physical Activity: Not on file   Stress: Not on file   Social Connections: Not on  "file   Intimate Partner Violence: Not on file   Housing Stability: Not on file       Family history:   Family History   Problem Relation Age of Onset    Cancer Father         prostate, age 90    Stroke Mother     Diabetes Neg Hx     Heart Disease Neg Hx          Current medications:   Current Outpatient Medications   Medication    amLODIPine (NORVASC) 2.5 MG Tab    Ascorbic Acid (VITAMIN C) 1000 MG Tab    ferrous sulfate 325 (65 Fe) MG tablet    vitamin D (CHOLECALCIFEROL) 1000 UNIT Tab     No current facility-administered medications for this visit.       Medication Allergy:  Allergies   Allergen Reactions    Asa [Aspirin]            Physical examination:   Vitals:    03/10/23 0740   BP: 128/86   BP Location: Right arm   Patient Position: Sitting   BP Cuff Size: Adult   Pulse: 93   Resp: 18   Temp: 37.1 °C (98.7 °F)   TempSrc: Temporal   SpO2: 98%   Weight: 59 kg (130 lb 1.1 oz)   Height: 1.626 m (5' 4\")       Normal cephalic atraumatic.  There is full range of movement around the neck in all directions without restrictions or discrete pain evoked triggers.  No lower extremity edema.      Neurological  Exam:      [place] Cognitive Assessment (MOCA) Version 7.1    Years of Education: 4 years of college    TOTAL SCORE: 24/30  (to be scanned into the MEDIA section in the E.M.R.)          Mental status: Awake, alert and fully oriented to person, place, time, and situation. Normal attention and concentration.  Did not appear/act combative,irritable,anxious,paranoid/delusional or aggressive to or with me.    Speech and language: Speech is fluent without errors, clear, intact to repetition, and intact to naming.     Follows 3 step motor commands in sequence without significant delay and correctly.    Cranial nerve exam:  II: Pupils are equally round and reactive to light. Visual fields are intact by confrontation.  III, IV, VI: EOMI, no diplopia, no ptosis.  V: Sensation to light touch is normal over V1-3 distributions " "bilaterally.  .  VII: Facial movements are symmetrical. There is no facial droop. .  VIII: Hearing intact to soft speech and finger rub bilaterally  IX: Palate elevates symmetrically, uvula is midline. Dysarthria is not present.  XI: Shoulder shrug are symmetrical and strong.   XII: Tongue protrudes midline.      Motor exam:  Muscle tone is normal in all 4 limbs. and No abnormal movements appreciated.    Muscle strength:    Neck Flexors/Extensors: 5/5       Right  Left  Deltoid   5/5  5/5      Biceps   5/5  5/5  Triceps              5/5  5/5   Wrist extensors 5/5  5/5  Wrist flexors  5/5  5/5     5/5  5/5  Interossei  5/5  5/5  Thenar (APB)  5/5  5/5   Hip flexors  5/5  5/5  Quadriceps  5/5  5/5    Hamstrings  5/5  5/5  Dorsiflexors  5/5  5/5  Plantarflexors  5/5  5/5  Toe extension  5/5  5/5        Reflexes:       Right  Left  Biceps   2/4  2/4  Triceps             2/4  2/4  Brachioradialis  2/4  2/4  Knee jerk  2/4  2/4  Ankle jerk  2/4  2/4     Frontal release signs are absent    bilaterally toes are downgoing to plantar stimulation..    Coordination (finger-to-nose, heel/knee/shin, rapid alternating movements) was normal.     There was no ataxia, no tremors, and no dysmetria.     Station and gait  and easily stands up from exam chair without retropulsion,veering,leaning,swaying (to either side).       Labs and Tests:     Blood work reviewed from 8/2022:     166  151  176  164  192  206 High    136     Triglycerides 0 - 149 mg/dL 157 High   150 High   136  157 High   153 High   196 High   134     HDL >=40 mg/dL 43  46  43 R, CM  45 R, CM  46  42 R, CM                Impression/Plans/Recommendations:      Mild Cognitive Impairment (global deterioration score in the 2 to 3  range).  Primarily issues with locating certain items but without consistently losing personal items.    MOCA of 24/30 (3 out of 5 word recall, difficulty with \"trails\" section)    There are no  features of a rapidly progressive " "neuropsychiatric condition and there have not been any behavioral issues evolving.    Christophe does not at all feel excessive anxious nor depressed in the recent months.    He does feel his wife is aware of his concerns but from his own view she was not directly commenting to him about the issues he raised before he asked her about what he was experienced.           Today, I reviewed the clinical criteria and reviewed several  scenarios of the differences being using the medical terms of normal brain aging (age associated memory impairment),  Mild Cognitive Impairment (MCI) and Dementia.    MCI is a syndrome but statistically and for the majority of patients  occurs due  to a more rapid aging of the brain tissue or potentially from injury to certain parts of one's brain ( often from such contributing factors as  the cumulative effects of alcohol, from one or more ischemic or hemmorhagic stroke(s), from neurodegeneration or long standing with/without suboptimally controlled Hypertension). It is for some of these potential factors as to why I recommend a brain imaging test (Head CT or Brain MRI) be done for the 1st time or in certain circumstances repeated for comparison purposes  as such imaging can suggest one or more factors as to the reason(s) for the person's cognitive/memory changes. In fact, a normal or \"age related\" finding on a brain imaging test in and of itself is useful clinical and objective information to have in the evaluation of a person who has either an acute, evolving or even chronic (months to years) long cognitive/memory complaint.     Additional factors or contributors to Brain Health issues can be summarized in several books/references which I discussed as well today.      Goals going forwards include:     A. Paying attention to one's risk factors and reducing their prevalence or potential impact on one's changing memory/thinking> an excellent example would be to stop smoking, reduce or eliminate " alcohol use (depending on the amount and frequency of usage), maintain good blood pressure control by buying a digital arm blood pressure cuff such as an OMRON Series 3 or 5 and checking one's blood pressure atleast 3 days per week (in the am and early afternoon) that the numbers are under 140/90 and working as needed with the primary care doctor  to optimize blood pressure control).        B. Encouraging proper sleep hygiene which for most adults is 7 to 8 hours of uninterrupted sleep per night.   (he feels that his sleep has been quite good and without any recent insomnia or excessive daytime sleepiness.     C. Encouraging some form of exercise preferable aerobic forms to be done (4 to 5 days per week- 30 minutes minimum per day)> 150 minutes per week as a goal. Example activities could include fast walking (up a slight incline), jogging, cycling (road or stationary), swimming,tennis. Essentially, even basic walking on a flat surface or a treadmill would be better than doing nothing.     D. Maintaining or forming new social contacts with family and friends  and a positive attitude despite the concerns and/or ongoing issues with thinking and/or memory.     E. Eating well which means a diet low in salt  (under 2 grams per day), sugar and saturated fat.     F. Maintaining one's BMI (Body Mass Index) under 30.    G. I am going to set up Christophe for more formal Neuropsychological testing with Dr. Banegas PhD.    H. A Brain MRI is going to be ordered to evaluate the structure of his brain tissue.    I.  I am asking Christophe to repeat blood work which I have ordered for him today.      I have performed  a history and physical exam and a directed /focused  ROS today.    Total time spent today or this patient's care was  26 and included reviewing  the diagnostic workup to date (such as labs and imaging as well as interpreting such tests relevant to this patient's neurological condition),  reviewing/obtaining separately obtained  history from Christophe   for today's neurological problem(s) ,counseling and educating the patient and family member on issues related to cognition/memory and cognitive health factors and documenting  the clinical information in the EMR.    Follow up with me in 6 months or so.        Sen Reed MD  [place] [facility]- [facility].   [facility]

## 2023-04-14 ENCOUNTER — OFFICE VISIT (OUTPATIENT)
Dept: MEDICAL GROUP | Facility: PHYSICIAN GROUP | Age: 68
End: 2023-04-14
Payer: MEDICARE

## 2023-04-14 VITALS
HEART RATE: 80 BPM | BODY MASS INDEX: 22.88 KG/M2 | WEIGHT: 134 LBS | TEMPERATURE: 98.5 F | DIASTOLIC BLOOD PRESSURE: 70 MMHG | SYSTOLIC BLOOD PRESSURE: 140 MMHG | OXYGEN SATURATION: 99 % | HEIGHT: 64 IN

## 2023-04-14 DIAGNOSIS — R41.3 MEMORY LOSS: ICD-10-CM

## 2023-04-14 DIAGNOSIS — K92.1 BLOOD IN THE STOOL: ICD-10-CM

## 2023-04-14 DIAGNOSIS — Z87.19 HX OF HEMORRHOIDS: ICD-10-CM

## 2023-04-14 PROCEDURE — 99214 OFFICE O/P EST MOD 30 MIN: CPT | Performed by: FAMILY MEDICINE

## 2023-04-14 RX ORDER — HYDROCORTISONE ACETATE 25 MG/1
25 SUPPOSITORY RECTAL EVERY 12 HOURS
Qty: 20 SUPPOSITORY | Refills: 0 | Status: SHIPPED | OUTPATIENT
Start: 2023-04-14 | End: 2023-04-24

## 2023-04-14 ASSESSMENT — FIBROSIS 4 INDEX: FIB4 SCORE: 1.38

## 2023-04-14 NOTE — PROGRESS NOTES
"Subjective:     Chief Complaint   Patient presents with    Other     Blood in stool x 1 week no pain   Neuro questions         HPI:   Christophe presents today to discuss the following.    Blood in the stool  New problem  Started noticing blood stools this week  Having some loose stools too    Looks dark red in color  Denies any pain with BM  Denies any weight loss    Last colonoscopy: within the last 10 years    Memory loss  Chronic issue  Would like referral to outside [facility]    Past Medical History:   Diagnosis Date    Cancer (HCC) 2013    prostate    Essential hypertension 5/22/2020       Current Outpatient Medications Ordered in Epic   Medication Sig Dispense Refill    hydrocortisone (ANUSOL-HC) 25 MG Suppos Insert 1 Suppository into the rectum every 12 hours for 10 days. 20 Suppository 0    amLODIPine (NORVASC) 2.5 MG Tab Take 1 Tablet by mouth every day. 90 Tablet 1    Ascorbic Acid (VITAMIN C) 1000 MG Tab Take  by mouth.      vitamin D (CHOLECALCIFEROL) 1000 UNIT Tab Take 1,000 Units by mouth every day.       No current [facility]-ordered facility-administered medications on file.       Allergies:  Asa [aspirin]    Health Maintenance: Completed    ROS:  Gen: no fevers/chills, no changes in weight  Eyes: no changes in vision  Pulm: no sob, no cough  CV: no chest pain, no palpitations  GI: no nausea/vomiting, no diarrhea      Objective:     Exam:  BP (!) 140/70 (BP Location: Left arm, Patient Position: Sitting, BP Cuff Size: Adult)   Pulse 80   Temp 36.9 °C (98.5 °F) (Temporal)   Ht 1.626 m (5' 4\")   Wt 60.8 kg (134 lb)   SpO2 99%   BMI 23.00 kg/m²  Body mass index is 23 kg/m².      Constitutional: Alert, no distress, well-groomed.  Skin: Warm, dry, good turgor, no rashes in visible areas.  Eye: Equal, round and reactive, conjunctiva clear, lids normal.  ENMT: Lips without lesions, good dentition, moist mucous membranes.  Neck: Trachea midline, no masses, no thyromegaly.  Respiratory: Unlabored respiratory " effort, no cough.  ABD: mild epigastric pain.   MSK: Normal gait, moves all extremities.  Neuro: Grossly non-focal.   Psych: Alert and oriented x3, normal affect and mood.        Assessment & Plan:     67 y.o. male with the following -     1. Blood in the stool  New problem.  Unknown etiology and prognosis.  I would like to refer the patient to a gastroenterologist for colonoscopy.  He does carry a history of hemorrhoids status post removal many years ago.  Could this be recurrence?  I would like to empirically provide hydrocortisone suppositories at this time.  The patient also endorses some epigastric tenderness so I would like to also recommend PPI.  Await gastroenterology's recommendations.  - Referral to GI for Colonoscopy    2. Hx of hemorrhoids  Hydrocortisone suppositories    3. Memory loss  Chronic, unchanged condition.  The patient is requesting a new neurology referral today outside [facility] facility.  - Referral to Neurology      No follow-ups on file.          Please note that this dictation was created using voice recognition software. I have made every reasonable attempt to correct obvious errors, but I expect that there are errors of grammar and possibly content that I did not discover before finalizing the note.

## 2023-04-14 NOTE — ASSESSMENT & PLAN NOTE
New problem  Started noticing blood stools this week  Having some loose stools too    Looks dark red in color  Denies any pain with BM  Denies any weight loss    Last colonoscopy: within the last 10 years

## 2023-07-25 ENCOUNTER — TELEPHONE (OUTPATIENT)
Dept: NEUROLOGY | Facility: MEDICAL CENTER | Age: 68
End: 2023-07-25
Payer: MEDICARE

## 2023-07-25 NOTE — TELEPHONE ENCOUNTER
NEUROLOGY PATIENT PRE-VISIT PLANNING     Patient was NOT contacted to complete PVP.  Note: Patient will not be contacted if there is no indication to call.     Patient Appointment is scheduled as: Established Patient     Is visit type and length scheduled correctly? Yes    [facility] is checked in Patient Demographics? Yes    3.   Is referral attached to visit? Yes    4. Were records received from referring provider? Yes    4. Patient was contacted to have someone accompany them to visit.     5. Is this appointment scheduled as a Hospital Follow-Up?  No    6. Does the patient require any pre procedure or post procedure follow up? No    7. If any orders were placed at last visit or intended to be done for this visit do we have Results/Consult Notes? No  Labs - Labs ordered, NOT completed. Patient advised to complete prior to next appointment.  Imaging - Imaging ordered, NOT completed. Patient advised to complete prior to next appointment.  Referrals - No referrals were ordered at last office visit.  Note: If patient appointment is for lab or imaging review and patient did not complete the studies, check with provider if OK to reschedule patient until completed.    8. If patient appointment is for Botox - is order pended for provider? N/A

## 2023-08-16 ENCOUNTER — DOCUMENTATION (OUTPATIENT)
Dept: HEALTH INFORMATION MANAGEMENT | Facility: OTHER | Age: 68
End: 2023-08-16
Payer: MEDICARE

## 2023-10-12 ENCOUNTER — OFFICE VISIT (OUTPATIENT)
Dept: MEDICAL GROUP | Facility: MEDICAL CENTER | Age: 68
End: 2023-10-12
Payer: MEDICARE

## 2023-10-12 VITALS
HEIGHT: 64 IN | DIASTOLIC BLOOD PRESSURE: 82 MMHG | HEART RATE: 69 BPM | BODY MASS INDEX: 23.73 KG/M2 | OXYGEN SATURATION: 99 % | WEIGHT: 139 LBS | TEMPERATURE: 98.6 F | RESPIRATION RATE: 16 BRPM | SYSTOLIC BLOOD PRESSURE: 126 MMHG

## 2023-10-12 DIAGNOSIS — R05.3 CHRONIC COUGH: ICD-10-CM

## 2023-10-12 DIAGNOSIS — E78.2 MIXED HYPERLIPIDEMIA: ICD-10-CM

## 2023-10-12 DIAGNOSIS — K21.9 GASTROESOPHAGEAL REFLUX DISEASE WITHOUT ESOPHAGITIS: ICD-10-CM

## 2023-10-12 DIAGNOSIS — Z23 NEED FOR VACCINATION: ICD-10-CM

## 2023-10-12 DIAGNOSIS — I10 ESSENTIAL HYPERTENSION: ICD-10-CM

## 2023-10-12 DIAGNOSIS — Z85.46 H/O PROSTATE CANCER: ICD-10-CM

## 2023-10-12 PROCEDURE — 3074F SYST BP LT 130 MM HG: CPT | Performed by: FAMILY MEDICINE

## 2023-10-12 PROCEDURE — 3079F DIAST BP 80-89 MM HG: CPT | Performed by: FAMILY MEDICINE

## 2023-10-12 PROCEDURE — G0008 ADMIN INFLUENZA VIRUS VAC: HCPCS | Performed by: FAMILY MEDICINE

## 2023-10-12 PROCEDURE — 99204 OFFICE O/P NEW MOD 45 MIN: CPT | Mod: 25 | Performed by: FAMILY MEDICINE

## 2023-10-12 PROCEDURE — 90662 IIV NO PRSV INCREASED AG IM: CPT | Performed by: FAMILY MEDICINE

## 2023-10-12 RX ORDER — SILDENAFIL 100 MG/1
1 TABLET, FILM COATED ORAL
COMMUNITY

## 2023-10-12 RX ORDER — LIDOCAINE 50 MG/G
OINTMENT TOPICAL
COMMUNITY
Start: 2023-09-29 | End: 2023-10-12

## 2023-10-12 RX ORDER — PREDNISONE 20 MG/1
20 TABLET ORAL DAILY
Qty: 5 TABLET | Refills: 0 | Status: SHIPPED | OUTPATIENT
Start: 2023-10-12

## 2023-10-12 RX ORDER — LIDOCAINE 50 MG/G
OINTMENT TOPICAL
COMMUNITY
End: 2023-12-23

## 2023-10-12 RX ORDER — OMEPRAZOLE 20 MG/1
20 CAPSULE, DELAYED RELEASE ORAL 2 TIMES DAILY
Qty: 30 CAPSULE | Refills: 0 | Status: SHIPPED | OUTPATIENT
Start: 2023-10-12 | End: 2023-10-26

## 2023-10-12 RX ORDER — ROSUVASTATIN CALCIUM 20 MG/1
20 TABLET, COATED ORAL EVERY EVENING
Qty: 90 TABLET | Refills: 1 | Status: SHIPPED | OUTPATIENT
Start: 2023-10-12

## 2023-10-12 RX ORDER — HYDROCODONE BITARTRATE AND ACETAMINOPHEN 5; 325 MG/1; MG/1
1 TABLET ORAL 3 TIMES DAILY
COMMUNITY
End: 2023-10-12

## 2023-10-12 RX ORDER — AMLODIPINE BESYLATE 2.5 MG/1
1 TABLET ORAL
COMMUNITY
End: 2023-10-12

## 2023-10-12 RX ORDER — OXYCODONE HYDROCHLORIDE AND ACETAMINOPHEN 5; 325 MG/1; MG/1
TABLET ORAL
COMMUNITY
Start: 2023-09-22 | End: 2023-10-12

## 2023-10-12 ASSESSMENT — FIBROSIS 4 INDEX: FIB4 SCORE: 1.4

## 2023-10-12 NOTE — PROGRESS NOTES
CC: New patient: Hypertension, hyperlipidemia, history of prostate cancer, chronic cough, acid reflux    HPI:  Christophe presents today to establish new PCP.    Patient has been active and independent with all ADLs.  Has the following chronic medical issues:      Essential hypertension  Blood pressure has been adequately controlled on amlodipine 2.5 mg daily.  No side effects    Mixed hyperlipidemia  Patient has high 10 years cardiac risk score, 17%.  I discussed the importance of statin with the patient, and also discussed side effects.  Patient is interested to try them.  Patient has no history of CAD, stroke, or diabetes.    H/O prostate cancer  Patient with history of prostate cancer status post surgical removal.  Last PSA was normal.  Currently denies any urinary retention or blood in the urine.  Requested referral to urology for follow-up.    Chronic cough  Patient has been having chronic cough on and off, for more than 5 months.  However he denies any fever, or shortness of breath.  He denies any weight loss, and he is non-smoker.  Oxygen saturation has been normal.  No history of asthma or COPD.    Gastroesophageal reflux disease without esophagitis  Patient with history of acid reflux, has been on omeprazole has been helping, needs refill.    Patient is due for flu shot.      Patient Active Problem List    Diagnosis Date Noted    Blood in the stool 04/14/2023    Hx of hemorrhoids 04/14/2023    Conductive hearing loss, bilateral 05/13/2022    Strain of left supraspinatus muscle 05/13/2022    Chronic laryngitis 05/14/2021    Chronic cough 03/10/2021    Memory loss 08/26/2020    Essential hypertension 05/22/2020    Multiple joint pain 05/22/2020    History of resection of small bowel 08/15/2019    Iron deficiency anemia 12/16/2015    Episodic lightheadedness 12/11/2015    History of prostate cancer 03/12/2015       Current Outpatient Medications   Medication Sig Dispense Refill    rosuvastatin (CRESTOR) 20 MG Tab  Take 1 Tablet by mouth every evening. 90 Tablet 1    omeprazole (PRILOSEC) 20 MG delayed-release capsule Take 1 Capsule by mouth 2 times a day. 30 Capsule 0    predniSONE (DELTASONE) 20 MG Tab Take 1 Tablet by mouth every day. 5 Tablet 0    sildenafil citrate (VIAGRA) 100 MG tablet Take 1 Tablet by mouth every day.      lidocaine (XYLOCAINE) 5 % Ointment       amLODIPine (NORVASC) 2.5 MG Tab Take 1 Tablet by mouth every day. 90 Tablet 1    Ascorbic Acid (VITAMIN C) 1000 MG Tab Take  by mouth.      vitamin D (CHOLECALCIFEROL) 1000 UNIT Tab Take 1,000 Units by mouth every day.       No current facility-administered medications for this visit.         Allergies as of 10/12/2023 - Reviewed 10/12/2023   Allergen Reaction Noted    Asa [aspirin]  04/05/2010        Social History     Socioeconomic History    Marital status:      Spouse name: Not on file    Number of children: Not on file    Years of education: Not on file    Highest education level: Not on file   Occupational History    Not on file   Tobacco Use    Smoking status: Never    Smokeless tobacco: Never    Tobacco comments:     quit 1976,<1 pckyr   Vaping Use    Vaping Use: Never used   Substance and Sexual Activity    Alcohol use: Yes     Comment: weekend    Drug use: No    Sexual activity: Yes     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: , manufacturing   Other Topics Concern    Not on file   Social History Narrative    Not on file     Social Determinants of Health     Financial Resource Strain: Not on file   Food Insecurity: Not on file   Transportation Needs: Not on file   Physical Activity: Not on file   Stress: Not on file   Social Connections: Not on file   Intimate Partner Violence: Not on file   Housing Stability: Not on file       Family History   Problem Relation Age of Onset    Cancer Father         prostate, age 90    Stroke Mother     Diabetes Neg Hx     Heart Disease Neg Hx        Past Surgical History:   Procedure Laterality Date    ELBOW  "EXPLORATION  2005    cubital tunnel    BOWEL RESECTION  1996    volvulus, small intestine    COLONOSCOPY  1996, 2009    neg    PROSTATECTOMY, RADIAL Bilateral        ROS:  Denies any Headache, Blurred Vision, Confusion Chest pain,  Shortness of breath,  Abdominal pain, Changes of bowel or bladder, Lower ext edema, Fevers, Nights sweats, Weight Changes, Focal weakness or numbness.  All other systems are negative.    /82 (BP Location: Right arm, Patient Position: Sitting, BP Cuff Size: Adult)   Pulse 69   Temp 37 °C (98.6 °F) (Temporal)   Resp 16   Ht 1.626 m (5' 4\")   Wt 63 kg (139 lb)   SpO2 99%   BMI 23.86 kg/m²     Physical Exam:  Gen:         Alert and oriented, No apparent distress.  HEENT:   Perrla, TM clear,  Oralpharynx no erythema or exudates.  Neck:       No Jugular venous distension, Lymphadenopathy, Thyromegaly, Bruits.  Lungs:     Clear to auscultation bilaterally  CV:          Regular rate and rhythm. No murmurs, rubs or gallops.  Abd:         Soft non tender, non distended. Normal active bowel sounds. No                                        Hepatosplenomegaly, No pulsatile masses.  Ext:          No clubbing, cyanosis, edema.      Assessment and Plan.   68 y.o. male     1. Essential hypertension  Controlled.  Continue on amlodipine 2.5 mg daily.    - Comp Metabolic Panel; Future  - TSH; Future  - Lipid Profile; Future  - CBC WITH DIFFERENTIAL; Future    2. Mixed hyperlipidemia  Patient has high 10 years cardiac risk score, 17%.  Discussed statin, and side effects.  He is interested to try them.  Please try patient on rosuvastatin 20 mg daily.    - rosuvastatin (CRESTOR) 20 MG Tab; Take 1 Tablet by mouth every evening.  Dispense: 90 Tablet; Refill: 1  - TSH; Future  - Lipid Profile; Future    3. H/O prostate cancer  Patient with history of prostate cancer status post surgical removal.  Last PSA was normal.  Patient requested referral to urology for follow-up.  An order for PSA placed    - " Referral to Urology  - PROSTATE SPECIFIC AG SCREENING; Future    4. Chronic cough  Patient has been having chronic cough on and off, denies any shortness of breath.  Denies any weight loss.  And he is non-smoker.  Possibly allergic, will try prednisone 20 mg daily for 5 days.  However patient advised to take omeprazole and Flonase if the cough does not improve.    - DX-CHEST-2 VIEWS; Future  - predniSONE (DELTASONE) 20 MG Tab; Take 1 Tablet by mouth every day.  Dispense: 5 Tablet; Refill: 0    5. Need for vaccination  Due for flu shot, given today.  - INFLUENZA VACCINE, HIGH DOSE (65+ ONLY)    6. Gastroesophageal reflux disease without esophagitis  Chronic.  Omeprazole has been helping, needs refill  - omeprazole (PRILOSEC) 20 MG delayed-release capsule; Take 1 Capsule by mouth 2 times a day.  Dispense: 30 Capsule; Refill: 0

## 2023-10-19 ENCOUNTER — HOSPITAL ENCOUNTER (OUTPATIENT)
Dept: LAB | Facility: MEDICAL CENTER | Age: 68
End: 2023-10-19
Attending: FAMILY MEDICINE
Payer: MEDICARE

## 2023-10-19 DIAGNOSIS — Z85.46 H/O PROSTATE CANCER: ICD-10-CM

## 2023-10-19 DIAGNOSIS — I10 ESSENTIAL HYPERTENSION: ICD-10-CM

## 2023-10-19 DIAGNOSIS — G31.84 MILD COGNITIVE IMPAIRMENT WITH MEMORY LOSS: ICD-10-CM

## 2023-10-19 DIAGNOSIS — E78.2 MIXED HYPERLIPIDEMIA: ICD-10-CM

## 2023-10-19 LAB
ALBUMIN SERPL BCP-MCNC: 4.2 G/DL (ref 3.2–4.9)
ALBUMIN/GLOB SERPL: 1.4 G/DL
ALP SERPL-CCNC: 65 U/L (ref 30–99)
ALT SERPL-CCNC: 31 U/L (ref 2–50)
ANION GAP SERPL CALC-SCNC: 11 MMOL/L (ref 7–16)
ANISOCYTOSIS BLD QL SMEAR: ABNORMAL
AST SERPL-CCNC: 31 U/L (ref 12–45)
BASOPHILS # BLD AUTO: 0.8 % (ref 0–1.8)
BASOPHILS # BLD: 0.06 K/UL (ref 0–0.12)
BILIRUB SERPL-MCNC: 0.3 MG/DL (ref 0.1–1.5)
BUN SERPL-MCNC: 9 MG/DL (ref 8–22)
CALCIUM ALBUM COR SERPL-MCNC: 8.2 MG/DL (ref 8.5–10.5)
CALCIUM SERPL-MCNC: 8.4 MG/DL (ref 8.5–10.5)
CHLORIDE SERPL-SCNC: 105 MMOL/L (ref 96–112)
CHOLEST SERPL-MCNC: 104 MG/DL (ref 100–199)
CO2 SERPL-SCNC: 23 MMOL/L (ref 20–33)
COMMENT 1642: NORMAL
CREAT SERPL-MCNC: 0.75 MG/DL (ref 0.5–1.4)
EOSINOPHIL # BLD AUTO: 0.14 K/UL (ref 0–0.51)
EOSINOPHIL NFR BLD: 1.9 % (ref 0–6.9)
ERYTHROCYTE [DISTWIDTH] IN BLOOD BY AUTOMATED COUNT: 39.4 FL (ref 35.9–50)
FASTING STATUS PATIENT QL REPORTED: NORMAL
GFR SERPLBLD CREATININE-BSD FMLA CKD-EPI: 97 ML/MIN/1.73 M 2
GLOBULIN SER CALC-MCNC: 3 G/DL (ref 1.9–3.5)
GLUCOSE SERPL-MCNC: 90 MG/DL (ref 65–99)
HCT VFR BLD AUTO: 32.7 % (ref 42–52)
HDLC SERPL-MCNC: 51 MG/DL
HGB BLD-MCNC: 9.2 G/DL (ref 14–18)
HYPOCHROMIA BLD QL SMEAR: ABNORMAL
IMM GRANULOCYTES # BLD AUTO: 0.02 K/UL (ref 0–0.11)
IMM GRANULOCYTES NFR BLD AUTO: 0.3 % (ref 0–0.9)
LDLC SERPL CALC-MCNC: 41 MG/DL
LYMPHOCYTES # BLD AUTO: 2.18 K/UL (ref 1–4.8)
LYMPHOCYTES NFR BLD: 29.7 % (ref 22–41)
MCH RBC QN AUTO: 17 PG (ref 27–33)
MCHC RBC AUTO-ENTMCNC: 28.1 G/DL (ref 32.3–36.5)
MCV RBC AUTO: 60.3 FL (ref 81.4–97.8)
MICROCYTES BLD QL SMEAR: ABNORMAL
MONOCYTES # BLD AUTO: 0.92 K/UL (ref 0–0.85)
MONOCYTES NFR BLD AUTO: 12.5 % (ref 0–13.4)
MORPHOLOGY BLD-IMP: NORMAL
NEUTROPHILS # BLD AUTO: 4.02 K/UL (ref 1.82–7.42)
NEUTROPHILS NFR BLD: 54.8 % (ref 44–72)
NRBC # BLD AUTO: 0.02 K/UL
NRBC BLD-RTO: 0.3 /100 WBC (ref 0–0.2)
OVALOCYTES BLD QL SMEAR: NORMAL
PLATELET # BLD AUTO: 331 K/UL (ref 164–446)
PLATELET BLD QL SMEAR: NORMAL
PMV BLD AUTO: 8.6 FL (ref 9–12.9)
POIKILOCYTOSIS BLD QL SMEAR: NORMAL
POLYCHROMASIA BLD QL SMEAR: NORMAL
POTASSIUM SERPL-SCNC: 3.5 MMOL/L (ref 3.6–5.5)
PROT SERPL-MCNC: 7.2 G/DL (ref 6–8.2)
PSA SERPL-MCNC: 0.02 NG/ML (ref 0–4)
RBC # BLD AUTO: 5.42 M/UL (ref 4.7–6.1)
RBC BLD AUTO: PRESENT
SODIUM SERPL-SCNC: 139 MMOL/L (ref 135–145)
TARGETS BLD QL SMEAR: NORMAL
TRIGL SERPL-MCNC: 59 MG/DL (ref 0–149)
TSH SERPL DL<=0.005 MIU/L-ACNC: 1.58 UIU/ML (ref 0.38–5.33)
WBC # BLD AUTO: 7.3 K/UL (ref 4.8–10.8)

## 2023-10-19 PROCEDURE — 80061 LIPID PANEL: CPT

## 2023-10-19 PROCEDURE — 84443 ASSAY THYROID STIM HORMONE: CPT

## 2023-10-19 PROCEDURE — 80053 COMPREHEN METABOLIC PANEL: CPT

## 2023-10-19 PROCEDURE — 85025 COMPLETE CBC W/AUTO DIFF WBC: CPT

## 2023-10-19 PROCEDURE — 84153 ASSAY OF PSA TOTAL: CPT

## 2023-10-19 PROCEDURE — 36415 COLL VENOUS BLD VENIPUNCTURE: CPT

## 2023-10-24 ENCOUNTER — TELEPHONE (OUTPATIENT)
Dept: MEDICAL GROUP | Facility: MEDICAL CENTER | Age: 68
End: 2023-10-24
Payer: MEDICARE

## 2023-10-24 DIAGNOSIS — R05.3 CHRONIC COUGH: ICD-10-CM

## 2023-10-26 DIAGNOSIS — K21.9 GASTROESOPHAGEAL REFLUX DISEASE WITHOUT ESOPHAGITIS: ICD-10-CM

## 2023-10-26 RX ORDER — OMEPRAZOLE 20 MG/1
20 CAPSULE, DELAYED RELEASE ORAL 2 TIMES DAILY
Qty: 30 CAPSULE | Refills: 0 | Status: SHIPPED | OUTPATIENT
Start: 2023-10-26 | End: 2023-11-13

## 2023-10-27 ENCOUNTER — HOSPITAL ENCOUNTER (OUTPATIENT)
Dept: RADIOLOGY | Facility: MEDICAL CENTER | Age: 68
End: 2023-10-27
Attending: FAMILY MEDICINE
Payer: MEDICARE

## 2023-10-27 DIAGNOSIS — R05.3 CHRONIC COUGH: ICD-10-CM

## 2023-10-27 PROCEDURE — 71046 X-RAY EXAM CHEST 2 VIEWS: CPT

## 2023-10-30 ENCOUNTER — HOSPITAL ENCOUNTER (OUTPATIENT)
Dept: RADIOLOGY | Facility: MEDICAL CENTER | Age: 68
End: 2023-10-30
Attending: PSYCHIATRY & NEUROLOGY
Payer: MEDICARE

## 2023-10-30 DIAGNOSIS — G31.84 MILD COGNITIVE IMPAIRMENT WITH MEMORY LOSS: ICD-10-CM

## 2023-10-30 DIAGNOSIS — G94 OTHER DISORDERS OF BRAIN IN DISEASES CLASSIFIED ELSEWHERE: ICD-10-CM

## 2023-10-30 PROCEDURE — 70551 MRI BRAIN STEM W/O DYE: CPT

## 2023-11-12 DIAGNOSIS — K21.9 GASTROESOPHAGEAL REFLUX DISEASE WITHOUT ESOPHAGITIS: ICD-10-CM

## 2023-11-13 RX ORDER — OMEPRAZOLE 20 MG/1
20 CAPSULE, DELAYED RELEASE ORAL 2 TIMES DAILY
Qty: 30 CAPSULE | Refills: 0 | Status: SHIPPED | OUTPATIENT
Start: 2023-11-13 | End: 2023-12-12

## 2023-12-12 DIAGNOSIS — K21.9 GASTROESOPHAGEAL REFLUX DISEASE WITHOUT ESOPHAGITIS: ICD-10-CM

## 2023-12-12 RX ORDER — OMEPRAZOLE 20 MG/1
20 CAPSULE, DELAYED RELEASE ORAL 2 TIMES DAILY
Qty: 30 CAPSULE | Refills: 0 | Status: SHIPPED | OUTPATIENT
Start: 2023-12-12 | End: 2023-12-14 | Stop reason: SDUPTHER

## 2023-12-14 DIAGNOSIS — K21.9 GASTROESOPHAGEAL REFLUX DISEASE WITHOUT ESOPHAGITIS: ICD-10-CM

## 2023-12-14 RX ORDER — OMEPRAZOLE 20 MG/1
20 CAPSULE, DELAYED RELEASE ORAL 2 TIMES DAILY
Qty: 30 CAPSULE | Refills: 0 | Status: SHIPPED | OUTPATIENT
Start: 2023-12-14 | End: 2024-02-02 | Stop reason: SDUPTHER

## 2023-12-21 ENCOUNTER — OFFICE VISIT (OUTPATIENT)
Dept: URGENT CARE | Facility: CLINIC | Age: 68
End: 2023-12-21
Payer: MEDICARE

## 2023-12-21 ENCOUNTER — APPOINTMENT (OUTPATIENT)
Dept: URGENT CARE | Facility: CLINIC | Age: 68
End: 2023-12-21
Payer: MEDICARE

## 2023-12-21 VITALS
OXYGEN SATURATION: 96 % | DIASTOLIC BLOOD PRESSURE: 68 MMHG | TEMPERATURE: 97.2 F | HEART RATE: 82 BPM | WEIGHT: 140 LBS | RESPIRATION RATE: 16 BRPM | HEIGHT: 64 IN | SYSTOLIC BLOOD PRESSURE: 132 MMHG | BODY MASS INDEX: 23.9 KG/M2

## 2023-12-21 DIAGNOSIS — J06.9 VIRAL URI WITH COUGH: ICD-10-CM

## 2023-12-21 DIAGNOSIS — J04.0 LARYNGITIS: ICD-10-CM

## 2023-12-21 PROCEDURE — 99213 OFFICE O/P EST LOW 20 MIN: CPT | Performed by: NURSE PRACTITIONER

## 2023-12-21 PROCEDURE — 3075F SYST BP GE 130 - 139MM HG: CPT | Performed by: NURSE PRACTITIONER

## 2023-12-21 PROCEDURE — 3078F DIAST BP <80 MM HG: CPT | Performed by: NURSE PRACTITIONER

## 2023-12-21 RX ORDER — METHYLPREDNISOLONE 4 MG/1
TABLET ORAL
Qty: 21 TABLET | Refills: 0 | Status: SHIPPED | OUTPATIENT
Start: 2023-12-21

## 2023-12-21 RX ORDER — DEXTROMETHORPHAN HYDROBROMIDE AND PROMETHAZINE HYDROCHLORIDE 15; 6.25 MG/5ML; MG/5ML
5 SYRUP ORAL EVERY 4 HOURS PRN
Qty: 120 ML | Refills: 0 | Status: SHIPPED | OUTPATIENT
Start: 2023-12-21

## 2023-12-21 ASSESSMENT — ENCOUNTER SYMPTOMS
SHORTNESS OF BREATH: 0
CHILLS: 0
CONSTITUTIONAL NEGATIVE: 1
SORE THROAT: 1
SPUTUM PRODUCTION: 1
CARDIOVASCULAR NEGATIVE: 1
COUGH: 1
FEVER: 0

## 2023-12-21 ASSESSMENT — FIBROSIS 4 INDEX: FIB4 SCORE: 1.18

## 2023-12-21 ASSESSMENT — VISUAL ACUITY: OU: 1

## 2023-12-21 NOTE — PROGRESS NOTES
Subjective:     Christophe Rizo is a 68 y.o. male who presents for Cough (X6 days)       Cough  This is a new problem. Episode onset: 6 days. The problem has been gradually worsening. The cough is Productive of sputum. Associated symptoms include nasal congestion and a sore throat. Pertinent negatives include no chest pain, chills, fever or shortness of breath. Treatments tried: Mucinex. The treatment provided no relief. His past medical history is significant for environmental allergies.     Wife present and being evaluated with similar symptoms.    Review of Systems   Constitutional: Negative.  Negative for chills, fever and malaise/fatigue.   HENT:  Positive for congestion and sore throat.         Hoarseness   Respiratory:  Positive for cough and sputum production. Negative for shortness of breath.    Cardiovascular: Negative.  Negative for chest pain.   Endo/Heme/Allergies:  Positive for environmental allergies.   All other systems reviewed and are negative.    Refer to HPI for additional details.    During this visit, appropriate PPE was worn, and hand hygiene was performed.    PMH:  has a past medical history of Cancer ([facility]) (2013) and Essential hypertension (5/22/2020).    He has no past medical history of Hyperlipidemia or Thyroid disease.    MEDS:   Current Outpatient Medications:     promethazine-dextromethorphan (PROMETHAZINE-DM) 6.25-15 MG/5ML syrup, Take 5 mL by mouth every four hours as needed for Cough., Disp: 120 mL, Rfl: 0    methylPREDNISolone (MEDROL DOSEPAK) 4 MG Tablet Therapy Pack, Follow schedule on package instructions., Disp: 21 Tablet, Rfl: 0    omeprazole (PRILOSEC) 20 MG delayed-release capsule, Take 1 Capsule by mouth 2 times a day., Disp: 30 Capsule, Rfl: 0    sildenafil citrate (VIAGRA) 100 MG tablet, Take 1 Tablet by mouth every day., Disp: , Rfl:     lidocaine (XYLOCAINE) 5 % Ointment, , Disp: , Rfl:     rosuvastatin (CRESTOR) 20 MG Tab, Take 1 Tablet by mouth every evening.,  "Disp: 90 Tablet, Rfl: 1    predniSONE (DELTASONE) 20 MG Tab, Take 1 Tablet by mouth every day., Disp: 5 Tablet, Rfl: 0    amLODIPine (NORVASC) 2.5 MG Tab, Take 1 Tablet by mouth every day., Disp: 90 Tablet, Rfl: 1    Ascorbic Acid (VITAMIN C) 1000 MG Tab, Take  by mouth., Disp: , Rfl:     vitamin D (CHOLECALCIFEROL) 1000 UNIT Tab, Take 1,000 Units by mouth every day., Disp: , Rfl:     ALLERGIES:   Allergies   Allergen Reactions    Asa [Aspirin]      SURGHX:   Past Surgical History:   Procedure Laterality Date    ELBOW EXPLORATION  2005    cubital tunnel    BOWEL RESECTION  1996    volvulus, small intestine    COLONOSCOPY  1996, 2009    neg    PROSTATECTOMY, RADIAL Bilateral      SOCHX:  reports that he has never smoked. He has never used smokeless tobacco. He reports current alcohol use. He reports that he does not use drugs.    FH: Per HPI as applicable/pertinent.      Objective:     /68 (BP Location: Left arm, Patient Position: Sitting, BP Cuff Size: Adult)   Pulse 82   Temp 36.2 °C (97.2 °F) (Temporal)   Resp 16   Ht 1.626 m (5' 4\")   Wt 63.5 kg (140 lb)   SpO2 96%   BMI 24.03 kg/m²     Physical Exam  Nursing note reviewed.   Constitutional:       General: He is not in acute distress.     Appearance: He is well-developed. He is not toxic-appearing.   HENT:      Head: Normocephalic.      Right Ear: External ear normal.      Left Ear: External ear normal.      Nose: Congestion present.      Mouth/Throat:      Mouth: Mucous membranes are moist.      Pharynx: Oropharynx is clear.   Eyes:      General: Vision grossly intact.      Extraocular Movements: Extraocular movements intact.      Conjunctiva/sclera: Conjunctivae normal.   Neck:      Comments: Hoarse voice  Cardiovascular:      Rate and Rhythm: Normal rate and regular rhythm.      Heart sounds: Normal heart sounds.   Pulmonary:      Effort: Pulmonary effort is normal. No respiratory distress.      Breath sounds: Normal breath sounds. No stridor. No " decreased breath sounds, wheezing, rhonchi or rales.   Musculoskeletal:         General: No deformity. Normal range of motion.      Cervical back: Normal range of motion.   Skin:     General: Skin is warm and dry.      Coloration: Skin is not pale.   Neurological:      Mental Status: He is alert and oriented to person, place, and time.      Motor: No weakness.   Psychiatric:         Behavior: Behavior normal. Behavior is cooperative.       Assessment/Plan:     1. Viral URI with cough  - promethazine-dextromethorphan (PROMETHAZINE-DM) 6.25-15 MG/5ML syrup; Take 5 mL by mouth every four hours as needed for Cough.  Dispense: 120 mL; Refill: 0    2. Laryngitis  - methylPREDNISolone (MEDROL DOSEPAK) 4 MG Tablet Therapy Pack; Follow schedule on package instructions.  Dispense: 21 Tablet; Refill: 0    Discussed likely self-limiting viral etiology and expected course and duration of illness. Vital signs stable, afebrile, no acute distress at this time.    Emphasize supportive measures, rest, fluids, and OTC medication PRN such as guaifenesin.  Rx as above sent electronically. .    Standard precautions/mask/wash hands.    Monitor. Return precautions advised.     Differential diagnosis, natural history, supportive care, over-the-counter symptom management per 's instructions, close monitoring, and indications for immediate follow-up discussed.     All questions answered. Patient agrees with the plan of care.    Discharge summary provided via [employer].

## 2023-12-23 ENCOUNTER — APPOINTMENT (OUTPATIENT)
Dept: RADIOLOGY | Facility: MEDICAL CENTER | Age: 68
End: 2023-12-23
Attending: STUDENT IN AN ORGANIZED HEALTH CARE EDUCATION/TRAINING PROGRAM
Payer: OTHER MISCELLANEOUS

## 2023-12-23 ENCOUNTER — HOSPITAL ENCOUNTER (EMERGENCY)
Facility: MEDICAL CENTER | Age: 68
End: 2023-12-23
Attending: STUDENT IN AN ORGANIZED HEALTH CARE EDUCATION/TRAINING PROGRAM
Payer: OTHER MISCELLANEOUS

## 2023-12-23 VITALS
WEIGHT: 140 LBS | BODY MASS INDEX: 23.9 KG/M2 | HEART RATE: 70 BPM | RESPIRATION RATE: 18 BRPM | HEIGHT: 64 IN | SYSTOLIC BLOOD PRESSURE: 152 MMHG | TEMPERATURE: 98.5 F | OXYGEN SATURATION: 92 % | DIASTOLIC BLOOD PRESSURE: 77 MMHG
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
## 2023-12-23 DIAGNOSIS — S16.1XXA STRAIN OF NECK MUSCLE, INITIAL ENCOUNTER: ICD-10-CM

## 2023-12-23 PROCEDURE — 307740 HCHG GREEN TRAUMA TEAM SERVICES

## 2023-12-23 PROCEDURE — 70450 CT HEAD/BRAIN W/O DYE: CPT

## 2023-12-23 PROCEDURE — 72125 CT NECK SPINE W/O DYE: CPT

## 2023-12-23 PROCEDURE — 99284 EMERGENCY DEPT VISIT MOD MDM: CPT

## 2023-12-23 PROCEDURE — 700102 HCHG RX REV CODE 250 W/ 637 OVERRIDE(OP): Performed by: STUDENT IN AN ORGANIZED HEALTH CARE EDUCATION/TRAINING PROGRAM

## 2023-12-23 PROCEDURE — A9270 NON-COVERED ITEM OR SERVICE: HCPCS | Performed by: STUDENT IN AN ORGANIZED HEALTH CARE EDUCATION/TRAINING PROGRAM

## 2023-12-23 RX ORDER — ACETAMINOPHEN 500 MG
1000 TABLET ORAL ONCE
Status: DISCONTINUED | OUTPATIENT
Start: 2023-12-23 | End: 2023-12-23 | Stop reason: HOSPADM

## 2023-12-23 RX ORDER — ACETAMINOPHEN 325 MG/1
TABLET ORAL
Status: COMPLETED | OUTPATIENT
Start: 2023-12-23 | End: 2023-12-23

## 2023-12-23 RX ORDER — CYCLOBENZAPRINE HCL 5 MG
5-10 TABLET ORAL 3 TIMES DAILY PRN
Qty: 10 TABLET | Refills: 0 | Status: SHIPPED | OUTPATIENT
Start: 2023-12-23

## 2023-12-23 RX ORDER — LIDOCAINE 50 MG/G
1 PATCH TOPICAL EVERY 24 HOURS
Qty: 10 PATCH | Refills: 0 | Status: SHIPPED | OUTPATIENT
Start: 2023-12-23

## 2023-12-23 RX ADMIN — ACETAMINOPHEN 1000 MG: 325 TABLET ORAL at 20:22

## 2023-12-23 ASSESSMENT — PAIN DESCRIPTION - PAIN TYPE: TYPE: ACUTE PAIN

## 2023-12-23 ASSESSMENT — FIBROSIS 4 INDEX: FIB4 SCORE: 1.18

## 2023-12-24 NOTE — DISCHARGE INSTRUCTIONS
You can take tylenol, ibuprofen and the flexeril as needed for neck pain. Return if you develop numbness, weakness, other new symptoms you find concerning. You will likely feel more sore over the next several day. Follow up with your doctor.

## 2023-12-24 NOTE — ED NOTES
Reviewed discharge instructions, pt verbalized understanding of follow up with PCP. All Rx/OTC medications reviewed with pt who voices understanding of medication use. Pt encouraged to return to the ED for unilateral weakness, severe headache, vomiting, or any other new/concerning symptoms.
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
## 2024-01-11 ENCOUNTER — APPOINTMENT (OUTPATIENT)
Dept: MEDICAL GROUP | Facility: MEDICAL CENTER | Age: 69
End: 2024-01-11
Payer: MEDICARE

## 2024-02-02 ENCOUNTER — OFFICE VISIT (OUTPATIENT)
Dept: MEDICAL GROUP | Facility: MEDICAL CENTER | Age: 69
End: 2024-02-02
Payer: MEDICARE

## 2024-02-02 ENCOUNTER — HOSPITAL ENCOUNTER (OUTPATIENT)
Dept: LAB | Facility: MEDICAL CENTER | Age: 69
End: 2024-02-02
Attending: FAMILY MEDICINE
Payer: MEDICARE

## 2024-02-02 VITALS
WEIGHT: 137 LBS | BODY MASS INDEX: 23.39 KG/M2 | TEMPERATURE: 98.1 F | DIASTOLIC BLOOD PRESSURE: 60 MMHG | HEIGHT: 64 IN | SYSTOLIC BLOOD PRESSURE: 140 MMHG | HEART RATE: 80 BPM | RESPIRATION RATE: 16 BRPM | OXYGEN SATURATION: 99 %

## 2024-02-02 DIAGNOSIS — K21.9 GASTROESOPHAGEAL REFLUX DISEASE WITHOUT ESOPHAGITIS: ICD-10-CM

## 2024-02-02 DIAGNOSIS — I10 ESSENTIAL HYPERTENSION: ICD-10-CM

## 2024-02-02 DIAGNOSIS — D64.9 ANEMIA, UNSPECIFIED TYPE: ICD-10-CM

## 2024-02-02 DIAGNOSIS — E78.5 DYSLIPIDEMIA: ICD-10-CM

## 2024-02-02 LAB
ANISOCYTOSIS BLD QL SMEAR: ABNORMAL
BASOPHILS # BLD AUTO: 0.6 % (ref 0–1.8)
BASOPHILS # BLD: 0.04 K/UL (ref 0–0.12)
COMMENT 1642: NORMAL
EOSINOPHIL # BLD AUTO: 0.19 K/UL (ref 0–0.51)
EOSINOPHIL NFR BLD: 3 % (ref 0–6.9)
ERYTHROCYTE [DISTWIDTH] IN BLOOD BY AUTOMATED COUNT: 46.8 FL (ref 35.9–50)
HCT VFR BLD AUTO: 39.6 % (ref 42–52)
HGB BLD-MCNC: 11.4 G/DL (ref 14–18)
HYPOCHROMIA BLD QL SMEAR: ABNORMAL
IMM GRANULOCYTES # BLD AUTO: 0.03 K/UL (ref 0–0.11)
IMM GRANULOCYTES NFR BLD AUTO: 0.5 % (ref 0–0.9)
IRON SATN MFR SERPL: 6 % (ref 15–55)
IRON SERPL-MCNC: 26 UG/DL (ref 50–180)
LYMPHOCYTES # BLD AUTO: 1.79 K/UL (ref 1–4.8)
LYMPHOCYTES NFR BLD: 28 % (ref 22–41)
MCH RBC QN AUTO: 18.5 PG (ref 27–33)
MCHC RBC AUTO-ENTMCNC: 28.8 G/DL (ref 32.3–36.5)
MCV RBC AUTO: 64.4 FL (ref 81.4–97.8)
MICROCYTES BLD QL SMEAR: ABNORMAL
MONOCYTES # BLD AUTO: 0.6 K/UL (ref 0–0.85)
MONOCYTES NFR BLD AUTO: 9.4 % (ref 0–13.4)
MORPHOLOGY BLD-IMP: NORMAL
NEUTROPHILS # BLD AUTO: 3.74 K/UL (ref 1.82–7.42)
NEUTROPHILS NFR BLD: 58.5 % (ref 44–72)
NRBC # BLD AUTO: 0 K/UL
NRBC BLD-RTO: 0 /100 WBC (ref 0–0.2)
OVALOCYTES BLD QL SMEAR: NORMAL
PLATELET # BLD AUTO: 368 K/UL (ref 164–446)
PLATELET BLD QL SMEAR: NORMAL
PMV BLD AUTO: 8.5 FL (ref 9–12.9)
POIKILOCYTOSIS BLD QL SMEAR: NORMAL
RBC # BLD AUTO: 6.15 M/UL (ref 4.7–6.1)
RBC BLD AUTO: PRESENT
TIBC SERPL-MCNC: 454 UG/DL (ref 250–450)
UIBC SERPL-MCNC: 428 UG/DL (ref 110–370)
WBC # BLD AUTO: 6.4 K/UL (ref 4.8–10.8)

## 2024-02-02 PROCEDURE — 3078F DIAST BP <80 MM HG: CPT | Performed by: FAMILY MEDICINE

## 2024-02-02 PROCEDURE — 83550 IRON BINDING TEST: CPT

## 2024-02-02 PROCEDURE — 3077F SYST BP >= 140 MM HG: CPT | Performed by: FAMILY MEDICINE

## 2024-02-02 PROCEDURE — 85025 COMPLETE CBC W/AUTO DIFF WBC: CPT

## 2024-02-02 PROCEDURE — 99214 OFFICE O/P EST MOD 30 MIN: CPT | Performed by: FAMILY MEDICINE

## 2024-02-02 PROCEDURE — 36415 COLL VENOUS BLD VENIPUNCTURE: CPT

## 2024-02-02 PROCEDURE — 83540 ASSAY OF IRON: CPT

## 2024-02-02 RX ORDER — OMEPRAZOLE 20 MG/1
20 CAPSULE, DELAYED RELEASE ORAL DAILY
Qty: 90 CAPSULE | Refills: 2 | Status: SHIPPED | OUTPATIENT
Start: 2024-02-02 | End: 2024-02-02

## 2024-02-02 RX ORDER — AMLODIPINE BESYLATE 5 MG/1
5 TABLET ORAL DAILY
Qty: 90 TABLET | Refills: 2 | Status: SHIPPED | OUTPATIENT
Start: 2024-02-02

## 2024-02-02 ASSESSMENT — FIBROSIS 4 INDEX: FIB4 SCORE: 1.18

## 2024-02-02 ASSESSMENT — PATIENT HEALTH QUESTIONNAIRE - PHQ9: CLINICAL INTERPRETATION OF PHQ2 SCORE: 0

## 2024-02-02 NOTE — PROGRESS NOTES
CC: Hypertension, hyperlipidemia, acid reflux, anemia    HPI:   Christophe presents today to discuss the following:    Essential hypertension  Patient's blood pressure today is slightly elevated.  Denies any headache, chest pain, shortness of breath.  He is currently on amlodipine 2.5 mg daily.    Dyslipidemia  He has been tolerating the statin. Denies muscle pain LFTs has been normal, has been on rosuvastatin 20 mg daily.  No side effects    Gastroesophageal reflux disease without esophagitis  Patient has been doing fine on omeprazole, his symptoms have improved.  Denies any epigastric pain, heartburn, nausea, vomiting.     Anemia, unspecified type  Patient's last CBC showed drop in hemoglobin to 9.2.  However he denies any tiredness or shortness of breath.  Also denies abdominal pain, or blood in the stool/denies hematemesis hematochezia          Patient Active Problem List    Diagnosis Date Noted    Dyslipidemia 02/02/2024    Blood in the stool 04/14/2023    Hx of hemorrhoids 04/14/2023    Conductive hearing loss, bilateral 05/13/2022    Strain of left supraspinatus muscle 05/13/2022    Chronic laryngitis 05/14/2021    Chronic cough 03/10/2021    Memory loss 08/26/2020    Essential hypertension 05/22/2020    Multiple joint pain 05/22/2020    History of resection of small bowel 08/15/2019    Iron deficiency anemia 12/16/2015    Episodic lightheadedness 12/11/2015    History of prostate cancer 03/12/2015       Current Outpatient Medications   Medication Sig Dispense Refill    lidocaine (LIDODERM) 5 % Patch Place 1 Patch on the skin every 24 hours. 10 Patch 0    cyclobenzaprine (FLEXERIL) 5 mg tablet Take 1-2 Tablets by mouth 3 times a day as needed for Mild Pain, Moderate Pain or Muscle Spasms. 10 Tablet 0    promethazine-dextromethorphan (PROMETHAZINE-DM) 6.25-15 MG/5ML syrup Take 5 mL by mouth every four hours as needed for Cough. 120 mL 0    methylPREDNISolone (MEDROL DOSEPAK) 4 MG Tablet Therapy Pack Follow schedule  "on package instructions. 21 Tablet 0    omeprazole (PRILOSEC) 20 MG delayed-release capsule Take 1 Capsule by mouth 2 times a day. 30 Capsule 0    sildenafil citrate (VIAGRA) 100 MG tablet Take 1 Tablet by mouth every day.      rosuvastatin (CRESTOR) 20 MG Tab Take 1 Tablet by mouth every evening. 90 Tablet 1    predniSONE (DELTASONE) 20 MG Tab Take 1 Tablet by mouth every day. 5 Tablet 0    amLODIPine (NORVASC) 2.5 MG Tab Take 1 Tablet by mouth every day. 90 Tablet 1    Ascorbic Acid (VITAMIN C) 1000 MG Tab Take  by mouth.      vitamin D (CHOLECALCIFEROL) 1000 UNIT Tab Take 1,000 Units by mouth every day.       No current facility-administered medications for this visit.         Allergies as of 02/02/2024 - Reviewed 02/02/2024   Allergen Reaction Noted    Asa [aspirin]  04/05/2010        ROS: Denies any chest pain, Shortness of breath, Changes bowel or bladder, Lower extremity edema.    Physical Exam:  BP (!) 140/60 (BP Location: Right arm, Patient Position: Sitting, BP Cuff Size: Adult)   Pulse 80   Temp 36.7 °C (98.1 °F) (Temporal)   Resp 16   Ht 1.626 m (5' 4\")   Wt 62.1 kg (137 lb)   SpO2 99%   BMI 23.52 kg/m²   Gen.: Well-developed, well-nourished, no apparent distress,pleasant and cooperative with the examination  Skin:  Warm and dry with good turgor. No rashes or suspicious lesions in visible areas  Eye: PERRLA, conjunctiva and sclera clear, lids normal  HEENT:Sinuses nontender with palpation, TMs clear, nares patent with pink mucosa, and clear rhinorrhea,no septal deviation ,polyps or lesions. lips without lesions, oropharynx clear.  Neck: Trachea midline,no masses or adenopathy. No JVD.  Thyroid: normal consistency and size. No masses or nodules. Not tender with palpation.  Cor: Regular rate and rhythm without murmur, gallop or rub.  Lungs: Respirations unlabored.Clear to auscultation with equal breath sounds bilaterally. No wheezes, rhonchi.  Abdomen: Soft nontender without hepatosplenomegaly or " masses appreciated, normoactive bowel sounds. No hernias.  Extremities: No cyanosis, clubbing or edema, Symmetrical without deformities or malformations. Pulses 2+ and symmetrical both upper and lower extremities  Psych: Alert and oriented x 3.Normal affect, judgement,insight and memory.          Assessment and Plan.   68 y.o. male     1. Essential hypertension  Elevated blood pressure.  Will increase amlodipine to 5 mg daily    2. Dyslipidemia  He has been tolerating the statin. Denies muscle pain LFTs has been normal  Continue rosuvastatin 20 mg daily    3. Gastroesophageal reflux disease without esophagitis  Patient has been doing fine on omeprazole, his symptoms have improved.  Will refill omeprazole 20 mg daily.    4. Anemia, unspecified type  Last CBC showed drop in hemoglobin to 9.2.  Denies abdominal pain, or blood in the stool.  Will repeat CBC, iron level.  Will send patient to check his stool for occult blood.    - CBC WITH DIFFERENTIAL; Future  - IRON/TOTAL IRON BIND; Future  - OCCULT BLOOD FECES IMMUNOASSAY; Future

## 2024-02-03 ENCOUNTER — HOSPITAL ENCOUNTER (OUTPATIENT)
Facility: MEDICAL CENTER | Age: 69
End: 2024-02-03
Attending: FAMILY MEDICINE
Payer: MEDICARE

## 2024-02-03 PROCEDURE — 82274 ASSAY TEST FOR BLOOD FECAL: CPT

## 2024-02-08 DIAGNOSIS — D64.9 ANEMIA, UNSPECIFIED TYPE: ICD-10-CM

## 2024-02-10 LAB — IMM ASSAY OCC BLD FITOB: POSITIVE

## 2024-02-11 DIAGNOSIS — D64.9 ANEMIA, UNSPECIFIED TYPE: ICD-10-CM

## 2024-02-11 DIAGNOSIS — R19.5 OCCULT BLOOD POSITIVE STOOL: ICD-10-CM

## 2024-03-14 ENCOUNTER — OFFICE VISIT (OUTPATIENT)
Dept: NEUROLOGY | Facility: MEDICAL CENTER | Age: 69
End: 2024-03-14
Attending: CLINICAL NEUROPSYCHOLOGIST
Payer: MEDICARE

## 2024-03-14 DIAGNOSIS — R41.9 COGNITIVE COMPLAINTS: ICD-10-CM

## 2024-03-14 PROCEDURE — 96116 NUBHVL XM PHYS/QHP 1ST HR: CPT | Performed by: CLINICAL NEUROPSYCHOLOGIST

## 2024-03-14 NOTE — PROGRESS NOTES
Neurobehavioral Status Exam    Patient name: Christophe Rizo  Referral source: Sen Reed M.D.   MRN: [id number]  Evaluation date: 3/14/2024   YOB: 1955  Neuropsychologist: Christina Banegas, Ph.D.         This evaluation was conducted for clinical treatment planning and may not be valid for other purposes. Potential risks and benefits, limits of confidentiality, and test procedures were discussed. Following this discussion, the patient consented to complete the evaluation. Information for this section was obtained from the medical record and a clinical interview with the patient conducted on 03/14/2024. Information included in this section is intended as a reference and should not be interpreted in the absence of the complete neuropsychological report. Please refer to the neuropsychological report for additional information including test results, impressions, and recommendations.     Of note, the patient reported he had an unexpected scheduling conflict and would not be able to complete the testing portion of the appointment today. As such, he was scheduled to complete neuropsychological testing on 03/29/2024.    Background and Referral Information: The patient is a 68-year-old, , left-handed,  (from the [facility]), bilingual ([place] and English), male, retired, with 16 years of education, who has experienced cognitive decline. Dr. Reed referred the patient for a neuropsychological evaluation to characterize his cognitive concerns, aid in differential diagnosis, and treatment planning.    Presenting Concerns Summary: The patient reported stable short-term memory problems for the past 2 to 3 years. Additionally, he noted mild difficulties with navigation. He also reported auditory comprehension problems due to hearing loss. He has bilateral hearing aids that he wears mostly outside his home. He stated his wife has managed their finances for several years and he has no problems with  simple monetary transactions. He reportedly remains independent in all remaining instrumental and basic activities of daily living (ADLs). He denied significant emotional distress during the clinical interview.     Patient Active Problem List   Diagnosis    History of prostate cancer    Episodic lightheadedness    Iron deficiency anemia    History of resection of small bowel    Essential hypertension    Multiple joint pain    Memory loss    Chronic cough    Chronic laryngitis    Conductive hearing loss, bilateral    Strain of left supraspinatus muscle    Blood in the stool    Hx of hemorrhoids    Dyslipidemia     Past Medical History:   Diagnosis Date    Cancer (HCC) 2013    prostate    Dyslipidemia 2/2/2024    Essential hypertension 5/22/2020      Past Surgical History:   Procedure Laterality Date    ELBOW EXPLORATION  2005    cubital tunnel    BOWEL RESECTION  1996    volvulus, small intestine    COLONOSCOPY  1996, 2009    neg    PROSTATECTOMY, RADIAL Bilateral       Family History   Problem Relation Age of Onset    Cancer Father         prostate, age 90    Stroke Mother     Diabetes Neg Hx     Heart Disease Neg Hx         Current Outpatient Medications:     amLODIPine (NORVASC) 5 MG Tab, Take 1 Tablet by mouth every day., Disp: 90 Tablet, Rfl: 2    lidocaine (LIDODERM) 5 % Patch, Place 1 Patch on the skin every 24 hours., Disp: 10 Patch, Rfl: 0    rosuvastatin (CRESTOR) 20 MG Tab, Take 1 Tablet by mouth every evening., Disp: 90 Tablet, Rfl: 1    Ascorbic Acid (VITAMIN C) 1000 MG Tab, Take  by mouth., Disp: , Rfl:     vitamin D (CHOLECALCIFEROL) 1000 UNIT Tab, Take 1,000 Units by mouth every day., Disp: , Rfl:     cyclobenzaprine (FLEXERIL) 5 mg tablet, Take 1-2 Tablets by mouth 3 times a day as needed for Mild Pain, Moderate Pain or Muscle Spasms. (Patient not taking: Reported on 3/14/2024), Disp: 10 Tablet, Rfl: 0    promethazine-dextromethorphan (PROMETHAZINE-DM) 6.25-15 MG/5ML syrup, Take 5 mL by mouth every  four hours as needed for Cough. (Patient not taking: Reported on 3/14/2024), Disp: 120 mL, Rfl: 0    methylPREDNISolone (MEDROL DOSEPAK) 4 MG Tablet Therapy Pack, Follow schedule on package instructions. (Patient not taking: Reported on 3/14/2024), Disp: 21 Tablet, Rfl: 0    sildenafil citrate (VIAGRA) 100 MG tablet, Take 1 Tablet by mouth every day. (Patient not taking: Reported on 3/14/2024), Disp: , Rfl:     predniSONE (DELTASONE) 20 MG Tab, Take 1 Tablet by mouth every day. 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: , manufacturing      Social Determinants of Health     Alcohol Use: Not on file   Depression: Not at risk (2/2/2024)    PHQ-2     PHQ-2 Score: 0   Financial Resource Strain: Not on file   Food Insecurity: Not on file   Housing Stability: Not on file   Intimate Partner Violence: Not on file   Physical Activity: Not on file   Social Connections: Not on file   Stress: Not on file   Tobacco Use: Low Risk  (3/14/2024)    Patient History     Smoking Tobacco Use: Never     Smokeless Tobacco Use: Never     Passive Exposure: Not on file   Transportation Needs: Not on file   Utilities: Not on file      Behavioral Observations: The patient arrived at the clinic on time and was unaccompanied. He was well groomed and dressed. He was alert and oriented to situation, person, place, and time. He was polite and always maintained appropriate interpersonal boundaries. Rapport was easily established. Gait was unremarkable. No gross abnormal movements or motor symptoms were observed. Speech rate, volume, articulation, and prosody were normal. Speech content was logical and  appropriate to context. Expressive and receptive language were intact during conversation. Insight into cognitive and emotional functioning was intact. Mood was euthymic during the appointment. Affect was mood-congruent and appropriate to the situation. There was no indication of hallucinations, delusions, or thought disorder.       Christina Banegas, Ph.D.          Clinical Neuropsychologist          Department of Neurology          [facility] spent 50 minutes interviewing the patient (neurobehavioral status exam: 19376 = 1).

## 2024-03-21 NOTE — PATIENT INSTRUCTIONS
Please return on 03/29/2024 at 8:30 AM to complete the neuropsychological testing portion of the appointment.

## 2024-03-29 ENCOUNTER — OFFICE VISIT (OUTPATIENT)
Dept: NEUROLOGY | Facility: MEDICAL CENTER | Age: 69
End: 2024-03-29
Attending: CLINICAL NEUROPSYCHOLOGIST
Payer: MEDICARE

## 2024-03-29 DIAGNOSIS — G31.84 MILD COGNITIVE IMPAIRMENT WITH MEMORY LOSS: ICD-10-CM

## 2024-03-29 DIAGNOSIS — R41.9 COGNITIVE COMPLAINTS: ICD-10-CM

## 2024-03-29 NOTE — PROGRESS NOTES
Neuropsychological Testing   transactions. He reportedly remains independent in all remaining instrumental and basic activities of daily living (ADLs). He denied significant emotional distress during the clinical interview.     Patient Active Problem List   Diagnosis    History of prostate cancer    Episodic lightheadedness    Iron deficiency anemia    History of resection of small bowel    Essential hypertension    Multiple joint pain    Memory loss    Chronic cough    Chronic laryngitis    Conductive hearing loss, bilateral    Strain of left supraspinatus muscle    Blood in the stool    Hx of hemorrhoids    Dyslipidemia     Past Medical History:   Diagnosis Date    Cancer (HCC) 2013    prostate    Dyslipidemia 2/2/2024    Essential hypertension 5/22/2020      Past Surgical History:   Procedure Laterality Date    ELBOW EXPLORATION  2005    cubital tunnel    BOWEL RESECTION  1996    volvulus, small intestine    COLONOSCOPY  1996, 2009    neg    PROSTATECTOMY, RADIAL Bilateral       Family History   Problem Relation Age of Onset    Cancer Father         prostate, age 90    Stroke Mother     Diabetes Neg Hx     Heart Disease Neg Hx         Current Outpatient Medications:     amLODIPine (NORVASC) 5 MG Tab, Take 1 Tablet by mouth every day., Disp: 90 Tablet, Rfl: 2    lidocaine (LIDODERM) 5 % Patch, Place 1 Patch on the skin every 24 hours., Disp: 10 Patch, Rfl: 0    cyclobenzaprine (FLEXERIL) 5 mg tablet, Take 1-2 Tablets by mouth 3 times a day as needed for Mild Pain, Moderate Pain or Muscle Spasms. (Patient not taking: Reported on 3/14/2024), Disp: 10 Tablet, Rfl: 0    promethazine-dextromethorphan (PROMETHAZINE-DM) 6.25-15 MG/5ML syrup, Take 5 mL by mouth every four hours as needed for Cough. (Patient not taking: Reported on 3/14/2024), Disp: 120 mL, Rfl: 0    methylPREDNISolone (MEDROL DOSEPAK) 4 MG Tablet Therapy Pack, Follow schedule on package instructions. (Patient not taking: Reported on 3/14/2024), Disp: 21 Tablet, Rfl: 0    sildenafil  citrate (VIAGRA) 100 MG tablet, Take 1 Tablet by mouth every day. (Patient not taking: Reported on 3/14/2024), Disp: , Rfl:     rosuvastatin (CRESTOR) 20 MG Tab, Take 1 Tablet by mouth every evening., Disp: 90 Tablet, Rfl: 1    predniSONE (DELTASONE) 20 MG Tab, Take 1 Tablet by mouth every day. 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: , manufacturing      Social Determinants of Health     Alcohol Use: Not on file   Depression: Not at risk (2/2/2024)    PHQ-2     PHQ-2 Score: 0   Financial Resource Strain: Not on file   Food Insecurity: Not on file   Housing Stability: Not on file   Intimate Partner Violence: Not on file   Physical Activity: Not on file   Social Connections: Not on file   Stress: Not on file   Tobacco Use: Low Risk  (3/29/2024)    Patient History     Smoking Tobacco Use: Never     Smokeless Tobacco Use: Never     Passive Exposure: Not on file   Transportation Needs: Not on file   Utilities: Not on file      Measures Administered: [place] Naming Test (BNT); Brief Visuospatial Memory Test - Revised (BVMT-R); Sharmaine-Hare Executive Functioning System (DKEFS): Color-Word Interference (CWI) & Verbal Fluency (VF); Executive Clock Drawing Test (CLOX); Generalized Anxiety Disorder 7 Item Scale (MIC-7); Lei Verbal Learning Test - Revised (HVLT-R); Mini-Mental State Examination - 2nd Ed. Orientation (MMSE-2); Patient Health Questionnaire (PHQ-9); Repeatable Battery for the Assessment of  Neuropsychological Status Line Orientation (RBANS LO); Test of Practical Judgment (TOPJ); Test of Premorbid Functioning (ToPF); Trail Making Test A & B (TMT); Wechsler Adult Intelligence Scale - 4th Ed. (WAIS-IV): Block Design (BD), Digit Span (DS), Matrix Reasoning (MR), Similarities (SI), Vocabulary (VC), Information (IN), & Coding (CD); and Wechsler Memory Scale - 4th Ed. Logical Memory (WMS-IV LM).    Behavioral Observations: The patient arrived at the clinic on time and was unaccompanied. He was well groomed and dressed. He was alert and oriented to situation, person, place, and time. He was polite and always maintained appropriate interpersonal boundaries. Rapport was easily established. Gait was unremarkable. No gross abnormal movements or motor symptoms were observed. Speech rate, volume, articulation, and prosody were normal. Speech content was logical and appropriate to context. Expressive and receptive language were intact during conversation. Insight into cognitive and emotional functioning was intact. Mood was euthymic during the appointment. Affect was mood-congruent and appropriate to the situation. There was no indication of hallucinations, delusions, or thought disorder. Vision was adequate for the evaluation. He forgot to wear his hearing aids and required repetition of test instruction due to hearing difficulty. With repetition, his auditory comprehension was adequate. He was attentive throughout the evaluation and had no difficulties with retention of task demands. He reported he was anxious at the beginning of the testing session and during the memory tasks, but relaxed as the session progressed. He perceived his performance on memory tasks as poor and apologized about it. However, he responded well to redirection and encouragement from the examiner. There was no evidence of overt fatigue, impulsivity, or disinhibition. Overall, he was engaged and cooperative throughout testing.      Christina PADGETT  Makenzie, Ph.D.          Clinical Neuropsychologist          Department of Neurology          [facility]           Five hours and 41 minutes of technician time were spent in test administration and scoring under my supervision ([id number] = 1, [id number] = 10).

## 2024-04-11 NOTE — PATIENT INSTRUCTIONS
Thank you for your effort during today's evaluation. We will have a feedback session to discuss your results on 04/22/2024 at 9 AM.

## 2024-04-13 DIAGNOSIS — E78.2 MIXED HYPERLIPIDEMIA: ICD-10-CM

## 2024-04-15 RX ORDER — ROSUVASTATIN CALCIUM 20 MG/1
20 TABLET, COATED ORAL EVERY EVENING
Qty: 90 TABLET | Refills: 1 | Status: SHIPPED | OUTPATIENT
Start: 2024-04-15

## 2024-04-22 ENCOUNTER — APPOINTMENT (OUTPATIENT)
Dept: NEUROLOGY | Facility: MEDICAL CENTER | Age: 69
End: 2024-04-22
Attending: CLINICAL NEUROPSYCHOLOGIST
Payer: MEDICARE

## 2024-04-22 DIAGNOSIS — R41.9 COGNITIVE COMPLAINTS: ICD-10-CM

## 2024-04-22 DIAGNOSIS — G31.84 MILD COGNITIVE IMPAIRMENT WITH MEMORY LOSS: ICD-10-CM

## 2024-04-22 PROCEDURE — 96133 NRPSYC TST EVAL PHYS/QHP EA: CPT | Performed by: CLINICAL NEUROPSYCHOLOGIST

## 2024-04-22 PROCEDURE — 96132 NRPSYC TST EVAL PHYS/QHP 1ST: CPT | Performed by: CLINICAL NEUROPSYCHOLOGIST

## 2024-04-22 NOTE — Clinical Note
Pranav Pulido,  I know it has been a while since I tested this patient, but I wanted to give you a heads up as he has a follow up apt. with you this week. Same for the other patient I sent you recently. It would be good for me to re-test this patient next year. He had clear MCI but etiology was not very evident based on his testing performance. Please let me know if you have any questions.  Daniel, Christina
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
## 2024-07-16 PROBLEM — M75.111 INCOMPLETE ROTATOR CUFF TEAR OR RUPTURE OF RIGHT SHOULDER, NOT SPECIFIED AS TRAUMATIC: Status: ACTIVE | Noted: 2024-07-16

## 2024-08-05 DIAGNOSIS — K21.9 GASTROESOPHAGEAL REFLUX DISEASE WITHOUT ESOPHAGITIS: ICD-10-CM

## 2024-08-06 ENCOUNTER — APPOINTMENT (OUTPATIENT)
Dept: RADIOLOGY | Facility: MEDICAL CENTER | Age: 69
End: 2024-08-06
Attending: EMERGENCY MEDICINE
Payer: MEDICARE

## 2024-08-06 ENCOUNTER — HOSPITAL ENCOUNTER (EMERGENCY)
Facility: MEDICAL CENTER | Age: 69
End: 2024-08-06
Attending: EMERGENCY MEDICINE
Payer: MEDICARE

## 2024-08-06 VITALS
HEART RATE: 58 BPM | HEIGHT: 64 IN | WEIGHT: 136.02 LBS | DIASTOLIC BLOOD PRESSURE: 62 MMHG | RESPIRATION RATE: 17 BRPM | OXYGEN SATURATION: 94 % | BODY MASS INDEX: 23.22 KG/M2 | TEMPERATURE: 98 F | SYSTOLIC BLOOD PRESSURE: 130 MMHG

## 2024-08-06 DIAGNOSIS — M54.50 ACUTE LEFT-SIDED LOW BACK PAIN WITHOUT SCIATICA: ICD-10-CM

## 2024-08-06 DIAGNOSIS — R10.9 FLANK PAIN: ICD-10-CM

## 2024-08-06 LAB
ALBUMIN SERPL BCP-MCNC: 4.1 G/DL (ref 3.2–4.9)
ALBUMIN/GLOB SERPL: 1.4 G/DL
ALP SERPL-CCNC: 71 U/L (ref 30–99)
ALT SERPL-CCNC: 90 U/L (ref 2–50)
ANION GAP SERPL CALC-SCNC: 12 MMOL/L (ref 7–16)
APPEARANCE UR: CLEAR
AST SERPL-CCNC: 61 U/L (ref 12–45)
BASOPHILS # BLD AUTO: 0.7 % (ref 0–1.8)
BASOPHILS # BLD: 0.05 K/UL (ref 0–0.12)
BILIRUB SERPL-MCNC: 0.4 MG/DL (ref 0.1–1.5)
BILIRUB UR QL STRIP.AUTO: NEGATIVE
BUN SERPL-MCNC: 9 MG/DL (ref 8–22)
CALCIUM ALBUM COR SERPL-MCNC: 9 MG/DL (ref 8.5–10.5)
CALCIUM SERPL-MCNC: 9.1 MG/DL (ref 8.5–10.5)
CHLORIDE SERPL-SCNC: 106 MMOL/L (ref 96–112)
CO2 SERPL-SCNC: 21 MMOL/L (ref 20–33)
COLOR UR: YELLOW
CREAT SERPL-MCNC: 0.61 MG/DL (ref 0.5–1.4)
EOSINOPHIL # BLD AUTO: 0.17 K/UL (ref 0–0.51)
EOSINOPHIL NFR BLD: 2.3 % (ref 0–6.9)
ERYTHROCYTE [DISTWIDTH] IN BLOOD BY AUTOMATED COUNT: 43.9 FL (ref 35.9–50)
GFR SERPLBLD CREATININE-BSD FMLA CKD-EPI: 103 ML/MIN/1.73 M 2
GLOBULIN SER CALC-MCNC: 3 G/DL (ref 1.9–3.5)
GLUCOSE SERPL-MCNC: 125 MG/DL (ref 65–99)
GLUCOSE UR STRIP.AUTO-MCNC: NEGATIVE MG/DL
HCT VFR BLD AUTO: 45 % (ref 42–52)
HGB BLD-MCNC: 14.3 G/DL (ref 14–18)
IMM GRANULOCYTES # BLD AUTO: 0.01 K/UL (ref 0–0.11)
IMM GRANULOCYTES NFR BLD AUTO: 0.1 % (ref 0–0.9)
KETONES UR STRIP.AUTO-MCNC: NEGATIVE MG/DL
LEUKOCYTE ESTERASE UR QL STRIP.AUTO: NEGATIVE
LIPASE SERPL-CCNC: 66 U/L (ref 11–82)
LYMPHOCYTES # BLD AUTO: 2.18 K/UL (ref 1–4.8)
LYMPHOCYTES NFR BLD: 29.3 % (ref 22–41)
MCH RBC QN AUTO: 25.6 PG (ref 27–33)
MCHC RBC AUTO-ENTMCNC: 31.8 G/DL (ref 32.3–36.5)
MCV RBC AUTO: 80.5 FL (ref 81.4–97.8)
MICRO URNS: NORMAL
MONOCYTES # BLD AUTO: 0.71 K/UL (ref 0–0.85)
MONOCYTES NFR BLD AUTO: 9.5 % (ref 0–13.4)
NEUTROPHILS # BLD AUTO: 4.32 K/UL (ref 1.82–7.42)
NEUTROPHILS NFR BLD: 58.1 % (ref 44–72)
NITRITE UR QL STRIP.AUTO: NEGATIVE
NRBC # BLD AUTO: 0 K/UL
NRBC BLD-RTO: 0 /100 WBC (ref 0–0.2)
PH UR STRIP.AUTO: 6.5 [PH] (ref 5–8)
PLATELET # BLD AUTO: 235 K/UL (ref 164–446)
PMV BLD AUTO: 9.1 FL (ref 9–12.9)
POTASSIUM SERPL-SCNC: 3.9 MMOL/L (ref 3.6–5.5)
PROT SERPL-MCNC: 7.1 G/DL (ref 6–8.2)
PROT UR QL STRIP: NEGATIVE MG/DL
RBC # BLD AUTO: 5.59 M/UL (ref 4.7–6.1)
RBC UR QL AUTO: NEGATIVE
SODIUM SERPL-SCNC: 139 MMOL/L (ref 135–145)
SP GR UR STRIP.AUTO: 1
UROBILINOGEN UR STRIP.AUTO-MCNC: 0.2 MG/DL
WBC # BLD AUTO: 7.4 K/UL (ref 4.8–10.8)

## 2024-08-06 PROCEDURE — 85025 COMPLETE CBC W/AUTO DIFF WBC: CPT

## 2024-08-06 PROCEDURE — 81003 URINALYSIS AUTO W/O SCOPE: CPT

## 2024-08-06 PROCEDURE — 99283 EMERGENCY DEPT VISIT LOW MDM: CPT

## 2024-08-06 PROCEDURE — 80053 COMPREHEN METABOLIC PANEL: CPT

## 2024-08-06 PROCEDURE — 74176 CT ABD & PELVIS W/O CONTRAST: CPT

## 2024-08-06 PROCEDURE — 36415 COLL VENOUS BLD VENIPUNCTURE: CPT

## 2024-08-06 PROCEDURE — 83690 ASSAY OF LIPASE: CPT

## 2024-08-06 RX ORDER — METHYLPREDNISOLONE 4 MG/1
TABLET ORAL
Qty: 21 TABLET | Refills: 0 | Status: SHIPPED | OUTPATIENT
Start: 2024-08-06

## 2024-08-06 ASSESSMENT — FIBROSIS 4 INDEX: FIB4 SCORE: 1.07

## 2024-08-06 NOTE — ED PROVIDER NOTES
ER Provider Note    Scribed for Reyes Roberts M.D. by Stacey Michaels. 8/6/2024   4:52 PM    Primary Care Provider: Joe Dia M.D.    CHIEF COMPLAINT  Chief Complaint   Patient presents with    Flank Pain     Starting yesterday around lunch time after doing some yard work.  Pt endorses he has a sharp pain on the left flank and abdomin when he moves, when he sits he states pain is better. Denies N/V.     EXTERNAL RECORDS REVIEWED      HPI/ROS  OUTSIDE HISTORIAN(S):  Significant other at bedside who endorsed the patient's history and provided additional history as seen below.     Christophe Rizo is a 69 y.o. male who presents to the ED for evaluation of sharp left sided back/flank pain onset yesterday. His pain is exacerbated with movement and he first noticed the pain after doing some yard work outside. Patient has not noticed any hematuria. He also denies any vomiting or diarrhea. Wife notes that the patient has a history of dyslipidemia and has his stool checked every three months. Patient is nearing his next three month check. She also reported that the patient has a history of abdominal surgery and blockage.     PAST MEDICAL HISTORY  Past Medical History:   Diagnosis Date    Cancer (HCC) 2013    prostate    Dyslipidemia 2/2/2024    Essential hypertension 5/22/2020       SURGICAL HISTORY  Past Surgical History:   Procedure Laterality Date    ELBOW EXPLORATION  2005    cubital tunnel    BOWEL RESECTION  1996    volvulus, small intestine    COLONOSCOPY  1996, 2009    neg    PROSTATECTOMY, RADIAL Bilateral        FAMILY HISTORY  Family History   Problem Relation Age of Onset    Cancer Father         prostate, age 90    Stroke Mother     Diabetes Neg Hx     Heart Disease Neg Hx        SOCIAL HISTORY   reports that he has never smoked. He has never used smokeless tobacco. He reports current alcohol use. He reports that he does not use drugs.    CURRENT MEDICATIONS  Previous Medications     "AMLODIPINE (NORVASC) 2.5 MG TAB    Take 2.5 mg by mouth every day.    ASCORBIC ACID (VITAMIN C) 1000 MG TAB    Take  by mouth.    ROSUVASTATIN (CRESTOR) 20 MG TAB    TAKE 1 TABLET BY MOUTH EVERY EVENING    VITAMIN D (CHOLECALCIFEROL) 1000 UNIT TAB    Take 1,000 Units by mouth every day.       ALLERGIES  Allergies   Allergen Reactions    Asa [Aspirin]         PHYSICAL EXAM  BP (!) 151/74   Pulse (!) 56   Temp 36.6 °C (97.8 °F) (Temporal)   Resp 14   Ht 1.626 m (5' 4\")   Wt 61.7 kg (136 lb 0.4 oz)   SpO2 95%   BMI 23.35 kg/m²    Nursing note and vitals reviewed.  Constitutional: Well-developed and well-nourished. No distress.   HENT: Head is normocephalic and atraumatic. Oropharynx is clear and moist without exudate or erythema.   Eyes: Pupils are equal, round, and reactive to light. Conjunctiva are normal.   Cardiovascular: Normal rate and regular rhythm. No murmur heard. Normal radial pulses.  Pulmonary/Chest: Breath sounds normal. No wheezes or rales.   Abdominal: Soft, No tenderness to the left lower quadrant without any rebound or guarding. No distention    Musculoskeletal: Extremities exhibit normal range of motion without edema or tenderness.   Back: Tenderness to the left lumbar paraspinal muscles/ Left flank.  Neurological: Awake, alert and oriented to person, place, and time. No focal deficits noted.  Skin: Skin is warm and dry. No rash.   Psychiatric: Normal mood and affect. Appropriate for clinical situation    DIAGNOSTIC STUDIES    Labs:   Results for orders placed or performed during the hospital encounter of 08/06/24   CBC WITH DIFFERENTIAL   Result Value Ref Range    WBC 7.4 4.8 - 10.8 K/uL    RBC 5.59 4.70 - 6.10 M/uL    Hemoglobin 14.3 14.0 - 18.0 g/dL    Hematocrit 45.0 42.0 - 52.0 %    MCV 80.5 (L) 81.4 - 97.8 fL    MCH 25.6 (L) 27.0 - 33.0 pg    MCHC 31.8 (L) 32.3 - 36.5 g/dL    RDW 43.9 35.9 - 50.0 fL    Platelet Count 235 164 - 446 K/uL    MPV 9.1 9.0 - 12.9 fL    Neutrophils-Polys 58.10 " 44.00 - 72.00 %    Lymphocytes 29.30 22.00 - 41.00 %    Monocytes 9.50 0.00 - 13.40 %    Eosinophils 2.30 0.00 - 6.90 %    Basophils 0.70 0.00 - 1.80 %    Immature Granulocytes 0.10 0.00 - 0.90 %    Nucleated RBC 0.00 0.00 - 0.20 /100 WBC    Neutrophils (Absolute) 4.32 1.82 - 7.42 K/uL    Lymphs (Absolute) 2.18 1.00 - 4.80 K/uL    Monos (Absolute) 0.71 0.00 - 0.85 K/uL    Eos (Absolute) 0.17 0.00 - 0.51 K/uL    Baso (Absolute) 0.05 0.00 - 0.12 K/uL    Immature Granulocytes (abs) 0.01 0.00 - 0.11 K/uL    NRBC (Absolute) 0.00 K/uL   COMP METABOLIC PANEL   Result Value Ref Range    Sodium 139 135 - 145 mmol/L    Potassium 3.9 3.6 - 5.5 mmol/L    Chloride 106 96 - 112 mmol/L    Co2 21 20 - 33 mmol/L    Anion Gap 12.0 7.0 - 16.0    Glucose 125 (H) 65 - 99 mg/dL    Bun 9 8 - 22 mg/dL    Creatinine 0.61 0.50 - 1.40 mg/dL    Calcium 9.1 8.5 - 10.5 mg/dL    Correct Calcium 9.0 8.5 - 10.5 mg/dL    AST(SGOT) 61 (H) 12 - 45 U/L    ALT(SGPT) 90 (H) 2 - 50 U/L    Alkaline Phosphatase 71 30 - 99 U/L    Total Bilirubin 0.4 0.1 - 1.5 mg/dL    Albumin 4.1 3.2 - 4.9 g/dL    Total Protein 7.1 6.0 - 8.2 g/dL    Globulin 3.0 1.9 - 3.5 g/dL    A-G Ratio 1.4 g/dL   LIPASE   Result Value Ref Range    Lipase 66 11 - 82 U/L   URINALYSIS    Specimen: Urine   Result Value Ref Range    Color Yellow     Character Clear     Specific Gravity 1.003 <1.035    Ph 6.5 5.0 - 8.0    Glucose Negative Negative mg/dL    Ketones Negative Negative mg/dL    Protein Negative Negative mg/dL    Bilirubin Negative Negative    Urobilinogen, Urine 0.2 Negative    Nitrite Negative Negative    Leukocyte Esterase Negative Negative    Occult Blood Negative Negative    Micro Urine Req see below    ESTIMATED GFR   Result Value Ref Range    GFR (CKD-EPI) 103 >60 mL/min/1.73 m 2       Radiology:   This attending emergency physician has independently interpreted the diagnostic imaging associated with this visit and is awaiting the final reading from the radiologist.    Preliminary interpretation is a follows: CT scan shows no evidence of ureterolithiasis or any evidence of acute inflammatory abnormality    Radiologist interpretation:   CT-RENAL COLIC EVALUATION(A/P W/O)   Final Result      1.  Punctate nonobstructive left renal stone. No evidence of hydronephrosis or hydroureter.   2.  Moderate cardiomegaly.   3.  Atherosclerosis of coronary artery disease.   4.  Postsurgical changes of the bowel.           INITIAL ASSESSMENT AND PLAN    4:52 PM - Patient was evaluated at bedside. Ordered for UA, CBC w/ Diff, Lipase, CMP and CT-Renal Colic Evaluation (A/P W/O) to evaluate. Patient verbalizes understanding and support with my plan of care. Differential diagnoses include but not limited to: Mechanical Back Pain vs Kidney stone vs UTI vs Diverticulitis.       COURSE AND MEDICAL DECISION MAKING    6:10 PM - The patient's labs and imaging are reassuring. Nursing staff will inform the patient of the plan for discharge with at home symptom management.     DISPOSITION AND DISCUSSIONS    Escalation of care considered, and ultimately not performed: acute inpatient care management, however at this time, the patient is most appropriate for outpatient management.    Decision tools and prescription drugs considered including, but not limited to:  Medrol .    The patient will return for new or worsening symptoms and is stable at the time of discharge.    The patient is referred to a primary physician for blood pressure management, diabetic screening, and for all other preventative health concerns.    DISPOSITION:  Patient will be discharged home in stable condition.    FOLLOW UP:  Joe Dia M.D.  [address] [place] [address] [address] [place] [id number] [phone]    Schedule an appointment as soon as possible for a visit       [facility], Emergency Dept  [address] [facility] [id number] [phone]    If symptoms worsen      OUTPATIENT MEDICATIONS:  New  Prescriptions    METHYLPREDNISOLONE (MEDROL) 4 MG TAB    Take as per the package instructions.       FINAL DIAGNOSIS  1. Flank pain    2. Acute left-sided low back pain without sciatica       IStacey (Luis Fernandoibingrid), am scribing for, and in the presence of, Reyes Roberts M.D..    Electronically signed by: Stacey Michaels (Scribe), 8/6/2024    IReyes M.D. personally performed the services described in this documentation, as scribed by Stacey Michaels in my presence, and it is both accurate and complete.      The note accurately reflects work and decisions made by me.  Reyes Roberts M.D.  8/6/2024  9:27 PM

## 2024-08-06 NOTE — ED TRIAGE NOTES
"Chief Complaint   Patient presents with    Flank Pain     Starting yesterday around lunch time after doing some yard work.  Pt endorses he has a sharp pain on the left flank and abdomin when he moves, when he sits he states pain is better. Denies N/V.     Pt ambulatory from lobby with steady gait.  Abdominal pain protocol ordered.     Pt is alert and oriented, speaking in full sentences, follows commands and responds appropriately to questions. Resp are even and unlabored.      Pt placed in lobby. Pt educated on triage process. Pt encouraged to alert staff for any changes.     Patient and staff wearing appropriate PPE.    BP (!) 151/74   Pulse (!) 56   Temp 36.6 °C (97.8 °F) (Temporal)   Resp 14   Ht 1.626 m (5' 4\")   Wt 61.7 kg (136 lb 0.4 oz)   SpO2 95%      "

## 2024-08-07 NOTE — ED NOTES
Late Entry:  ERP at bedside. This RN agree with triage. Pt on monitoring. Call light in reach. Blood drawn in triage. Educated on need for urine.   Updated on POC

## 2024-08-22 ENCOUNTER — OFFICE VISIT (OUTPATIENT)
Dept: MEDICAL GROUP | Facility: MEDICAL CENTER | Age: 69
End: 2024-08-22
Payer: MEDICARE

## 2024-08-22 VITALS
HEIGHT: 64 IN | TEMPERATURE: 98 F | HEART RATE: 80 BPM | BODY MASS INDEX: 23.37 KG/M2 | OXYGEN SATURATION: 96 % | SYSTOLIC BLOOD PRESSURE: 114 MMHG | WEIGHT: 136.91 LBS | RESPIRATION RATE: 18 BRPM | DIASTOLIC BLOOD PRESSURE: 60 MMHG

## 2024-08-22 DIAGNOSIS — E78.5 DYSLIPIDEMIA: ICD-10-CM

## 2024-08-22 DIAGNOSIS — E78.2 MIXED HYPERLIPIDEMIA: ICD-10-CM

## 2024-08-22 DIAGNOSIS — I10 ESSENTIAL HYPERTENSION: ICD-10-CM

## 2024-08-22 DIAGNOSIS — R74.8 ELEVATED LIVER ENZYMES: ICD-10-CM

## 2024-08-22 PROCEDURE — 3074F SYST BP LT 130 MM HG: CPT | Performed by: FAMILY MEDICINE

## 2024-08-22 PROCEDURE — 3078F DIAST BP <80 MM HG: CPT | Performed by: FAMILY MEDICINE

## 2024-08-22 PROCEDURE — 99214 OFFICE O/P EST MOD 30 MIN: CPT | Performed by: FAMILY MEDICINE

## 2024-08-22 ASSESSMENT — FIBROSIS 4 INDEX: FIB4 SCORE: 1.94

## 2024-08-22 NOTE — PROGRESS NOTES
CC: Hypertension, hyperlipidemia, elevated liver enzymes    HPI:   Christophe presents today to discuss the following:    Essential hypertension  Blood pressure has been adequately controlled on amlodipine 2.5 mg daily.  No side effects    Mixed hyperlipidemia  He has been tolerating the statin. Denies muscle pain.  However he has been having slightly elevated liver enzymes which  is acute.  Currently on rosuvastatin 20 mg daily    Elevated liver enzymes  Liver enzymes slightly elevated.  However patient has basically asymptomatic.  Denies any abdominal pain, nausea, or vomiting.  Denies any change in the color of the stool, skin or, or urine.  He has been on statin for a while.  Advised to continue on the statin and repeat liver enzymes    Right shoulder pain  Had rotator cuff tear, status post arthroscopic rotator cuff repair.  Currently on pain medication      Patient Active Problem List    Diagnosis Date Noted    Incomplete rotator cuff tear or rupture of right shoulder, not specified as traumatic 07/16/2024    Dyslipidemia 02/02/2024    Blood in the stool 04/14/2023    Hx of hemorrhoids 04/14/2023    Conductive hearing loss, bilateral 05/13/2022    Strain of left supraspinatus muscle 05/13/2022    Chronic laryngitis 05/14/2021    Chronic cough 03/10/2021    Memory loss 08/26/2020    Essential hypertension 05/22/2020    Multiple joint pain 05/22/2020    History of resection of small bowel 08/15/2019    Iron deficiency anemia 12/16/2015    Episodic lightheadedness 12/11/2015    History of prostate cancer 03/12/2015       Current Outpatient Medications   Medication Sig Dispense Refill    predniSONE (DELTASONE) 20 MG Tab Take 1 Tablet by mouth every day.      Phenylephrine-DM-GG 2.5-5-50 MG/5ML Liquid capsule      lidocaine (XYLOCAINE) 5 % Ointment APPLY PEAS SIZE AMOUNT OF OINTMENT IN THE ANAL CANAL THREE TIMES DAILY      hydrocortisone (ANUCORT-HC) 25 MG Suppos UNWRAP AND INSERT 1 SUPPOSITORY RECTALLY EVERY 12 HOURS  FOR 10 DAYS      hydrocortisone 2.5 % Cream topical cream APPLY TOPICALLY TO THE AFFECTED AREA TWICE DAILY FOR 2 WEEKS AS DIRECTED      acetaminophen (TYLENOL) 325 MG Tab tablet      Ferrous Sulfate Dried (FERROUS SULFATE CR PO) 1 daily      Nutritional Supplements (ENSURE ACTIVE PO) 1 daily      ondansetron (ZOFRAN) 4 MG Tab tablet Take 1 Tablet by mouth every 8 hours as needed for Nausea/Vomiting (as needed for post operative nausea) for up to 5 days. 15 Tablet 0    oxyCODONE immediate-release (ROXICODONE) 5 MG Tab Take 1 Tablet by mouth every four hours as needed for Severe Pain for up to 7 days. 42 Tablet 0    omeprazole (PRILOSEC) 20 MG delayed-release capsule TAKE 1 CAPSULE BY MOUTH EVERY DAY 90 Capsule 2    amLODIPine (NORVASC) 2.5 MG Tab Take 2.5 mg by mouth every day.      rosuvastatin (CRESTOR) 20 MG Tab TAKE 1 TABLET BY MOUTH EVERY EVENING 90 Tablet 1    Ascorbic Acid (VITAMIN C) 1000 MG Tab Take  by mouth.      vitamin D (CHOLECALCIFEROL) 1000 UNIT Tab Take 1,000 Units by mouth every day.      OMEPRAZOLE PO Take 1 Capsule by mouth 2 times a day. (Patient not taking: Reported on 8/22/2024)      amLODIPine (NORVASC) 2.5 MG Tab 90 (Patient not taking: Reported on 8/22/2024)      methylPREDNISolone (MEDROL DOSEPAK) 4 MG Tablet Therapy Pack FOLLOW PACKAGE DIRECTIONS (Patient not taking: Reported on 8/22/2024)      hydrocortisone 2.5 % Cream topical cream  (Patient not taking: Reported on 8/22/2024)      hydrocortisone 2.5 % Cream topical cream  (Patient not taking: Reported on 8/22/2024)      amoxicillin-clavulanate (AUGMENTIN) 875-125 MG Tab Take 1 Tablet by mouth 2 times a day. (Patient not taking: Reported on 8/22/2024)      Hydrocortisone (PREPARATION H EX) 1 suppository every 6 hours (Patient not taking: Reported on 8/22/2024)      OMEPRAZOLE MAGNESIUM PO 0 (Patient not taking: Reported on 8/22/2024)      rosuvastatin (CRESTOR) 20 MG Tab Take 1 Tablet by mouth every evening. (Patient not taking: Reported  "on 8/22/2024)      methylPREDNISolone (MEDROL) 4 MG Tab Take as per the package instructions. (Patient not taking: Reported on 8/22/2024) 21 Tablet 0     No current facility-administered medications for this visit.         Allergies as of 08/22/2024 - Reviewed 08/22/2024   Allergen Reaction Noted    Asa [aspirin]  04/05/2010        ROS: Denies any chest pain, Shortness of breath, Changes bowel or bladder, Lower extremity edema.    Physical Exam:  /60   Pulse 80   Temp 36.7 °C (98 °F)   Resp 18   Ht 1.626 m (5' 4\")   Wt 62.1 kg (136 lb 14.5 oz)   SpO2 96%   BMI 23.50 kg/m²   Gen.: Well-developed, well-nourished, no apparent distress,pleasant and cooperative with the examination  Skin:  Warm and dry with good turgor. No rashes or suspicious lesions in visible areas  HEENT:Sinuses nontender with palpation, TMs clear, nares patent with pink mucosa and clear rhinorrhea,no septal deviation ,polyps or lesions. lips without lesions, oropharynx clear.  Neck: Trachea midline,no masses or adenopathy. No JVD.  Cor: Regular rate and rhythm without murmur, gallop or rub.  Lungs: Respirations unlabored.Clear to auscultation with equal breath sounds bilaterally. No wheezes, rhonchi.  Extremities: No cyanosis, clubbing or edema.  Right shoulder: On brace    Assessment and Plan.   69 y.o. male     1. Essential hypertension  Controlled.  Continue on amlodipine 2.5 mg daily    2. Mixed hyperlipidemia  He has been tolerating the statin. Denies muscle pain.  Has slightly elevated liver enzymes which  is acute and mild  For now continue on rosuvastatin .    3. Elevated liver enzymes  Liver enzymes slightly elevated.  However patient has basically asymptomatic.    Will repeat liver function in 2 weeks.    - HEPATIC FUNCTION PANEL; Future    4. Right shoulder pain  Had rotator cuff tear, status post arthroscopic rotator cuff repair.      "

## 2024-09-09 ENCOUNTER — HOSPITAL ENCOUNTER (OUTPATIENT)
Dept: LAB | Facility: MEDICAL CENTER | Age: 69
End: 2024-09-09
Attending: FAMILY MEDICINE
Payer: MEDICARE

## 2024-09-09 DIAGNOSIS — R74.8 ELEVATED LIVER ENZYMES: ICD-10-CM

## 2024-09-09 LAB
ALBUMIN SERPL BCP-MCNC: 4.3 G/DL (ref 3.2–4.9)
ALP SERPL-CCNC: 75 U/L (ref 30–99)
ALT SERPL-CCNC: 110 U/L (ref 2–50)
AST SERPL-CCNC: 60 U/L (ref 12–45)
BILIRUB CONJ SERPL-MCNC: <0.2 MG/DL (ref 0.1–0.5)
BILIRUB INDIRECT SERPL-MCNC: ABNORMAL MG/DL (ref 0–1)
BILIRUB SERPL-MCNC: 0.5 MG/DL (ref 0.1–1.5)
PROT SERPL-MCNC: 7.2 G/DL (ref 6–8.2)

## 2024-09-09 PROCEDURE — 36415 COLL VENOUS BLD VENIPUNCTURE: CPT

## 2024-09-09 PROCEDURE — 80076 HEPATIC FUNCTION PANEL: CPT

## 2024-10-04 ENCOUNTER — HOSPITAL ENCOUNTER (OUTPATIENT)
Dept: LAB | Facility: MEDICAL CENTER | Age: 69
End: 2024-10-04
Attending: FAMILY MEDICINE
Payer: MEDICARE

## 2024-10-04 DIAGNOSIS — R74.8 ELEVATED LIVER ENZYMES: ICD-10-CM

## 2024-10-04 LAB
ALBUMIN SERPL BCP-MCNC: 4.1 G/DL (ref 3.2–4.9)
ALP SERPL-CCNC: 64 U/L (ref 30–99)
ALT SERPL-CCNC: 74 U/L (ref 2–50)
AST SERPL-CCNC: 57 U/L (ref 12–45)
BILIRUB CONJ SERPL-MCNC: 0.3 MG/DL (ref 0.1–0.5)
BILIRUB INDIRECT SERPL-MCNC: 0.4 MG/DL (ref 0–1)
BILIRUB SERPL-MCNC: 0.7 MG/DL (ref 0.1–1.5)
HAV IGM SERPL QL IA: NONREACTIVE
HBV CORE IGM SER QL: NONREACTIVE
HBV SURFACE AG SER QL: NONREACTIVE
HCV AB SER QL: NONREACTIVE
PROT SERPL-MCNC: 7 G/DL (ref 6–8.2)

## 2024-10-04 PROCEDURE — 80074 ACUTE HEPATITIS PANEL: CPT | Mod: GA

## 2024-10-04 PROCEDURE — 36415 COLL VENOUS BLD VENIPUNCTURE: CPT | Mod: GA

## 2024-10-04 PROCEDURE — 80076 HEPATIC FUNCTION PANEL: CPT

## 2024-10-06 PROBLEM — G31.84 MILD COGNITIVE IMPAIRMENT WITH MEMORY LOSS: Status: ACTIVE | Noted: 2024-10-06

## 2024-10-06 PROBLEM — R41.3 MEMORY LOSS: Status: RESOLVED | Noted: 2020-08-26 | Resolved: 2024-10-06

## 2024-10-09 ENCOUNTER — OFFICE VISIT (OUTPATIENT)
Dept: NEUROLOGY | Facility: MEDICAL CENTER | Age: 69
End: 2024-10-09
Attending: PSYCHIATRY & NEUROLOGY
Payer: MEDICARE

## 2024-10-09 VITALS
SYSTOLIC BLOOD PRESSURE: 134 MMHG | HEIGHT: 64 IN | OXYGEN SATURATION: 97 % | WEIGHT: 138.23 LBS | BODY MASS INDEX: 23.6 KG/M2 | DIASTOLIC BLOOD PRESSURE: 58 MMHG | HEART RATE: 64 BPM | TEMPERATURE: 97 F

## 2024-10-09 DIAGNOSIS — G31.84 AMNESTIC MCI (MILD COGNITIVE IMPAIRMENT WITH MEMORY LOSS): Primary | ICD-10-CM

## 2024-10-09 DIAGNOSIS — H90.3 SENSORINEURAL HEARING LOSS (SNHL) OF BOTH EARS: ICD-10-CM

## 2024-10-09 PROCEDURE — 3078F DIAST BP <80 MM HG: CPT | Performed by: PSYCHIATRY & NEUROLOGY

## 2024-10-09 PROCEDURE — 99214 OFFICE O/P EST MOD 30 MIN: CPT | Performed by: PSYCHIATRY & NEUROLOGY

## 2024-10-09 PROCEDURE — 3075F SYST BP GE 130 - 139MM HG: CPT | Performed by: PSYCHIATRY & NEUROLOGY

## 2024-10-09 PROCEDURE — 99212 OFFICE O/P EST SF 10 MIN: CPT | Performed by: PSYCHIATRY & NEUROLOGY

## 2024-10-09 RX ORDER — DONEPEZIL HYDROCHLORIDE 10 MG/1
TABLET, FILM COATED ORAL
Qty: 90 TABLET | Refills: 4 | Status: SHIPPED | OUTPATIENT
Start: 2024-10-09 | End: 2025-09-29

## 2024-10-09 ASSESSMENT — PATIENT HEALTH QUESTIONNAIRE - PHQ9: CLINICAL INTERPRETATION OF PHQ2 SCORE: 0

## 2024-10-09 ASSESSMENT — FIBROSIS 4 INDEX: FIB4 SCORE: 2

## 2024-10-10 ENCOUNTER — HOSPITAL ENCOUNTER (OUTPATIENT)
Dept: LAB | Facility: MEDICAL CENTER | Age: 69
End: 2024-10-10
Attending: PSYCHIATRY & NEUROLOGY
Payer: MEDICARE

## 2024-10-10 DIAGNOSIS — G31.84 AMNESTIC MCI (MILD COGNITIVE IMPAIRMENT WITH MEMORY LOSS): ICD-10-CM

## 2024-10-10 LAB
25(OH)D3 SERPL-MCNC: 27 NG/ML (ref 30–100)
FOLATE SERPL-MCNC: 17.9 NG/ML
TSH SERPL-ACNC: 1.41 UIU/ML (ref 0.35–5.5)
VIT B12 SERPL-MCNC: 447 PG/ML (ref 211–911)

## 2024-10-10 PROCEDURE — 82306 VITAMIN D 25 HYDROXY: CPT | Mod: GA

## 2024-10-10 PROCEDURE — 82607 VITAMIN B-12: CPT

## 2024-10-10 PROCEDURE — 84425 ASSAY OF VITAMIN B-1: CPT

## 2024-10-10 PROCEDURE — 82746 ASSAY OF FOLIC ACID SERUM: CPT

## 2024-10-10 PROCEDURE — 84443 ASSAY THYROID STIM HORMONE: CPT

## 2024-10-10 PROCEDURE — 36415 COLL VENOUS BLD VENIPUNCTURE: CPT | Mod: GA

## 2024-10-14 LAB — VIT B1 BLD-MCNC: 140 NMOL/L (ref 70–180)

## 2024-10-28 DIAGNOSIS — E78.2 MIXED HYPERLIPIDEMIA: ICD-10-CM

## 2024-10-28 RX ORDER — ROSUVASTATIN CALCIUM 20 MG/1
20 TABLET, COATED ORAL EVERY EVENING
Qty: 90 TABLET | Refills: 0 | Status: SHIPPED | OUTPATIENT
Start: 2024-10-28

## 2024-10-30 ENCOUNTER — HOSPITAL ENCOUNTER (OUTPATIENT)
Dept: LAB | Facility: MEDICAL CENTER | Age: 69
End: 2024-10-30
Attending: UROLOGY
Payer: MEDICARE

## 2024-10-30 LAB — PSA SERPL-MCNC: 0.02 NG/ML (ref 0–4)

## 2024-10-30 PROCEDURE — 36415 COLL VENOUS BLD VENIPUNCTURE: CPT

## 2024-10-30 PROCEDURE — 84153 ASSAY OF PSA TOTAL: CPT

## 2024-11-02 ENCOUNTER — HOSPITAL ENCOUNTER (OUTPATIENT)
Dept: RADIOLOGY | Facility: MEDICAL CENTER | Age: 69
End: 2024-11-02
Attending: FAMILY MEDICINE
Payer: MEDICARE

## 2024-11-02 DIAGNOSIS — R74.8 ELEVATED LIVER ENZYMES: ICD-10-CM

## 2024-11-02 PROCEDURE — 76705 ECHO EXAM OF ABDOMEN: CPT

## 2024-11-13 ENCOUNTER — HOSPITAL ENCOUNTER (OUTPATIENT)
Dept: RADIOLOGY | Facility: MEDICAL CENTER | Age: 69
End: 2024-11-13
Attending: INTERNAL MEDICINE
Payer: MEDICARE

## 2024-11-13 DIAGNOSIS — D50.0 IRON DEFICIENCY ANEMIA DUE TO CHRONIC BLOOD LOSS: ICD-10-CM

## 2024-11-13 PROCEDURE — 74018 RADEX ABDOMEN 1 VIEW: CPT

## 2024-11-21 ENCOUNTER — HOSPITAL ENCOUNTER (OUTPATIENT)
Dept: RADIOLOGY | Facility: MEDICAL CENTER | Age: 69
End: 2024-11-21
Attending: PSYCHIATRY & NEUROLOGY
Payer: MEDICARE

## 2024-11-21 DIAGNOSIS — G31.84 AMNESTIC MCI (MILD COGNITIVE IMPAIRMENT WITH MEMORY LOSS): ICD-10-CM

## 2024-11-21 LAB — GLUCOSE BLD-MCNC: 56 MG/DL (ref 65–99)

## 2024-11-21 PROCEDURE — A9552 F18 FDG: HCPCS

## 2024-12-11 ENCOUNTER — APPOINTMENT (OUTPATIENT)
Dept: MEDICAL GROUP | Facility: PHYSICIAN GROUP | Age: 69
End: 2024-12-11
Payer: MEDICARE

## 2024-12-11 VITALS
DIASTOLIC BLOOD PRESSURE: 68 MMHG | SYSTOLIC BLOOD PRESSURE: 122 MMHG | WEIGHT: 137.5 LBS | OXYGEN SATURATION: 94 % | HEIGHT: 64 IN | TEMPERATURE: 98.5 F | HEART RATE: 70 BPM | BODY MASS INDEX: 23.47 KG/M2

## 2024-12-11 DIAGNOSIS — R73.03 PREDIABETES: ICD-10-CM

## 2024-12-11 DIAGNOSIS — I10 ESSENTIAL HYPERTENSION: Chronic | ICD-10-CM

## 2024-12-11 DIAGNOSIS — Z85.46 HISTORY OF PROSTATE CANCER: ICD-10-CM

## 2024-12-11 DIAGNOSIS — H90.0 CONDUCTIVE HEARING LOSS, BILATERAL: Chronic | ICD-10-CM

## 2024-12-11 DIAGNOSIS — G31.84 MILD COGNITIVE IMPAIRMENT WITH MEMORY LOSS: ICD-10-CM

## 2024-12-11 DIAGNOSIS — Z12.5 SCREENING FOR MALIGNANT NEOPLASM OF PROSTATE: ICD-10-CM

## 2024-12-11 DIAGNOSIS — R74.8 LIVER ENZYME ELEVATION: ICD-10-CM

## 2024-12-11 DIAGNOSIS — K21.9 GASTROESOPHAGEAL REFLUX DISEASE WITHOUT ESOPHAGITIS: ICD-10-CM

## 2024-12-11 DIAGNOSIS — E55.9 VITAMIN D DEFICIENCY: ICD-10-CM

## 2024-12-11 DIAGNOSIS — E78.5 DYSLIPIDEMIA: Chronic | ICD-10-CM

## 2024-12-11 PROCEDURE — 99215 OFFICE O/P EST HI 40 MIN: CPT | Performed by: INTERNAL MEDICINE

## 2024-12-11 PROCEDURE — 3074F SYST BP LT 130 MM HG: CPT | Performed by: INTERNAL MEDICINE

## 2024-12-11 PROCEDURE — 3078F DIAST BP <80 MM HG: CPT | Performed by: INTERNAL MEDICINE

## 2024-12-11 RX ORDER — ROSUVASTATIN CALCIUM 10 MG/1
10 TABLET, COATED ORAL EVERY EVENING
Qty: 100 TABLET | Refills: 3 | Status: SHIPPED | OUTPATIENT
Start: 2024-12-11

## 2024-12-11 RX ORDER — AMLODIPINE BESYLATE 2.5 MG/1
2.5 TABLET ORAL DAILY
Qty: 100 TABLET | Refills: 3 | Status: SHIPPED | OUTPATIENT
Start: 2024-12-11

## 2024-12-11 ASSESSMENT — FIBROSIS 4 INDEX: FIB4 SCORE: 2

## 2024-12-11 NOTE — ASSESSMENT & PLAN NOTE
Chronic condition.  The patient has been followed by neurology service.  Patient has tried but intolerant to Aricept.  Condition remains stable.  The patient has pending appointment to follow-up with neurologist

## 2024-12-11 NOTE — ASSESSMENT & PLAN NOTE
Chronic condition.  Patient is taking amlodipine.  Blood pressure has been well-controlled.  The patient request Rx refill.  No significant side effects reported.

## 2024-12-11 NOTE — PROGRESS NOTES
PRIMARY CARE CLINIC VISIT        Chief Complaint   Patient presents with    New Patient     Establish care.       Patient here to establish care  Mild cognitive impairment  History of prostate cancer  Follow-up hypertension  Hyperlipidemia  Vitamin D deficiency  Acid reflux  Liver enzyme elevation  Prediabetes  Hearing loss  Medication review  Lab test request        History of Present Illness     Mild cognitive impairment with memory loss  Chronic condition.  The patient has been followed by neurology service.  Patient has tried but intolerant to Aricept.  Condition remains stable.  The patient has pending appointment to follow-up with neurologist    History of prostate cancer  Chronic condition.  Status post prostatectomy 2013.  Patient presently followed by urology service.  The most recent PSA 0.02.  The patient denies dysuria or hematuria.    Essential hypertension  Chronic condition.  Patient is taking amlodipine.  Blood pressure has been well-controlled.  The patient request Rx refill.  No significant side effects reported.    Dyslipidemia  Chronic condition.  The patient presently taking rosuvastatin.  20 Mg daily.  Chart reviewed with the patient.  Patient asymptomatic.    Lab test requested for follow-up.    Vitamin D deficiency  Chronic condition.  Patient currently taking vitamin D supplementation.  Lab test ordered for follow-up.  No new symptoms reported.    Gastroesophageal reflux disease without esophagitis  Chronic condition.  The patient currently taking omeprazole.  Patient denies nausea vomiting or dysphagia.    Liver enzyme elevation  This is a chronic condition.  Chart review showed patient with elevated liver enzyme.  Liver ultrasound also completed which was unremarkable    IMPRESSION:     1. No gallstone. No biliary dilatation.    Prediabetes  Chronic condition.  The patient currently on diet therapy.  Blood test ordered for follow-up.    Conductive hearing loss, bilateral  Chronic  condition.  The patient presently wearing hearing aids.  [facility]    Current Outpatient Medications on File Prior to Visit   Medication Sig Dispense Refill    vitamin D (CHOLECALCIFEROL) 1000 UNIT Tab Take 1,000 Units by mouth every day.      amLODIPine (NORVASC) 2.5 MG Tab       predniSONE (DELTASONE) 20 MG Tab Take 1 Tablet by mouth every day. (Patient not taking: Reported on 12/11/2024)      lidocaine (XYLOCAINE) 5 % Ointment APPLY PEAS SIZE AMOUNT OF OINTMENT IN THE ANAL CANAL THREE TIMES DAILY      hydrocortisone 2.5 % Cream topical cream APPLY TOPICALLY TO THE AFFECTED AREA TWICE DAILY FOR 2 WEEKS AS DIRECTED      hydrocortisone 2.5 % Cream topical cream       amoxicillin-clavulanate (AUGMENTIN) 875-125 MG Tab Take 1 Tablet by mouth 2 times a day.      acetaminophen (TYLENOL) 325 MG Tab tablet      Hydrocortisone (PREPARATION H EX)       Ferrous Sulfate Dried (FERROUS SULFATE CR PO) 1 daily      Nutritional Supplements (ENSURE ACTIVE PO)       Ascorbic Acid (VITAMIN C) 1000 MG Tab Take  by mouth.       No current facility-administered medications on file prior to visit.        Allergies: Aricept [donepezil] and Asa [aspirin]    Current Outpatient Medications Ordered in Epic   Medication Sig Dispense Refill    amLODIPine (NORVASC) 2.5 MG Tab Take 1 Tablet by mouth every day. 100 Tablet 3    omeprazole (PRILOSEC) 20 MG delayed-release capsule Take 1 Capsule by mouth every day. 100 Capsule 3    rosuvastatin (CRESTOR) 10 MG Tab Take 1 Tablet by mouth every evening. 100 Tablet 3    vitamin D (CHOLECALCIFEROL) 1000 UNIT Tab Take 1,000 Units by mouth every day.      amLODIPine (NORVASC) 2.5 MG Tab       predniSONE (DELTASONE) 20 MG Tab Take 1 Tablet by mouth every day. (Patient not taking: Reported on 12/11/2024)      lidocaine (XYLOCAINE) 5 % Ointment APPLY PEAS SIZE AMOUNT OF OINTMENT IN THE ANAL CANAL THREE TIMES DAILY      hydrocortisone 2.5 % Cream topical cream APPLY TOPICALLY TO THE  AFFECTED AREA TWICE DAILY FOR 2 WEEKS AS DIRECTED      hydrocortisone 2.5 % Cream topical cream       amoxicillin-clavulanate (AUGMENTIN) 875-125 MG Tab Take 1 Tablet by mouth 2 times a day.      acetaminophen (TYLENOL) 325 MG Tab tablet      Hydrocortisone (PREPARATION H EX)       Ferrous Sulfate Dried (FERROUS SULFATE CR PO) 1 daily      Nutritional Supplements (ENSURE ACTIVE PO)       Ascorbic Acid (VITAMIN C) 1000 MG Tab Take  by mouth.       No current Epic-ordered facility-administered medications on file.       Past Medical History:   Diagnosis Date    Cancer (HCC) 2013    prostate    Dyslipidemia 2/2/2024    Essential hypertension 5/22/2020       Past Surgical History:   Procedure Laterality Date    PB SHLDR ARTHROSCOP,SURG,W/ROTAT CUFF REPB Right 8/20/2024    Procedure: RIGHT SHOULDER ARTHROSCOPY ROTATOR CUFF REPAIR;  Surgeon: Jose C Marinelli M.D.;  Location: [facility];  Service: Orthopedics    PB REPAIR BICEPS LONG TENDON Right 8/20/2024    Procedure: POSSIBLE RIGHT BICEPS TENOTOMY, POSSIBLE RIGHT OPEN SUBPECTORAL TENODESIS;  Surgeon: Jose C Marinelli M.D.;  Location: [facility];  Service: Orthopedics    OR SHLDR ARTHROSCOP PART DEBRIDE 1-2 Right 8/20/2024    Procedure: RIGHT LABRAL DEBRIDEMENT;  Surgeon: Jose C Marinelli M.D.;  Location: [facility];  Service: Orthopedics    OR SHLDR ARTHROSCOP,PART ACROMIOPLAS Right 8/20/2024    Procedure: RIGHT SUBACROMIAL DECOMPRESSION, REPAIRS AS INDICATED;  Surgeon: Jose C Marinelli M.D.;  Location: [facility];  Service: Orthopedics    ELBOW EXPLORATION  2005    cubital tunnel    BOWEL RESECTION  1996    volvulus, small intestine    COLONOSCOPY  1996, 2009    neg    PROSTATECTOMY, RADIAL Bilateral        Family History   Problem Relation Age of Onset    Cancer Father         prostate, age 90    Stroke Mother     Diabetes Neg Hx     Heart Disease Neg Hx        Social  "History     Tobacco Use   Smoking Status Never   Smokeless Tobacco Never   Tobacco Comments    quit 1976,<1 pckyr       Social History     Substance and Sexual Activity   Alcohol Use Yes    Comment: weekend       Review of systems  As per HPI above. All other systems reviewed and negative.      Past Medical, Social, and Family history reviewed and updated in [facility]       LAB DATA:     I have independently reviewed / interpreted labs    Lab Results   Component Value Date/Time    HBA1C 5.7 (H) 04/27/2018 07:10 AM    HBA1C 6.0 (H) 12/12/2015 07:22 AM    HBA1C 5.4 05/10/2014 07:14 AM    HBA1C 6.2 (H) 09/19/2013 10:58 AM        Lab Results   Component Value Date/Time    WBC 7.4 08/06/2024 04:46 PM    HEMOGLOBIN 14.3 08/06/2024 04:46 PM    HEMATOCRIT 45.0 08/06/2024 04:46 PM    MCV 80.5 (L) 08/06/2024 04:46 PM    PLATELETCT 235 08/06/2024 04:46 PM       Lab Results   Component Value Date/Time    SODIUM 139 08/06/2024 04:46 PM    POTASSIUM 3.9 08/06/2024 04:46 PM    GLUCOSE 125 (H) 08/06/2024 04:46 PM    BUN 9 08/06/2024 04:46 PM    CREATININE 0.61 08/06/2024 04:46 PM    CREATININE 0.92 04/09/2010 08:12 AM       Lab Results   Component Value Date/Time    CHOLSTRLTOT 104 10/19/2023 06:35 AM    TRIGLYCERIDE 59 10/19/2023 06:35 AM    HDL 51 10/19/2023 06:35 AM    LDL 41 10/19/2023 06:35 AM       Lab Results   Component Value Date/Time    ALTSGPT 74 (H) 10/04/2024 07:23 AM          Objective     /68 (BP Location: Right arm, Patient Position: Sitting, BP Cuff Size: Adult)   Pulse 70   Temp 36.9 °C (98.5 °F) (Temporal)   Ht 1.626 m (5' 4\")   Wt 62.4 kg (137 lb 8 oz)   SpO2 94%    Body mass index is 23.6 kg/m².    General: alert in no apparent distress.  Cardiovascular: regular rate and rhythm  Pulmonary: lungs : no wheezing   Gastrointestinal: BS present.   Cranial nerves II to XII grossly intact    Assessment and Plan     1. Mild cognitive impairment with memory loss  Chronic stable condition.  The patient intolerant " to Aricept.  Advised the patient to follow-up with neurologist    2. History of prostate cancer  Chronic condition.  Status post prostatectomy 2013.  Recommend PSA testing yearly.  Continue follow-up with urologist    3. Vitamin D deficiency  Chronic condition.  Current status unclear.  Lab test ordered for follow-up    4. Gastroesophageal reflux disease without esophagitis  - omeprazole (PRILOSEC) 20 MG delayed-release capsule; Take 1 Capsule by mouth every day.  Dispense: 100 Capsule; Refill: 3  Chronic condition.  Stable.  Continue omeprazole 20 Mg daily.    5. Liver enzyme elevation  Chronic condition.  Liver ultrasound completed as above.  Recommend additional testing to include hepatitis serology.  Also recommend patient to reduce the dosage of rosuvastatin from 20 Mg to 10 Mg daily.  Repeat lab test next visit    - HEPATIC FUNCTION PANEL; Future  - HEP B SURFACE AB; Future  - HEP B SURFACE ANTIGEN; Future  - HEP C VIRUS ANTIBODY; Future    6. Prediabetes  - HEMOGLOBIN A1C; Future  - Basic Metabolic Panel; Future  Chronic condition.  Current status unclear.  Lab test ordered to check A1c.  Recommend diet and lifestyle modification    7. Essential hypertension  - MICROALBUMIN CREAT RATIO URINE; Future  - TSH; Future  - amLODIPine (NORVASC) 2.5 MG Tab; Take 1 Tablet by mouth every day.  Dispense: 100 Tablet; Refill: 3  Chronic stable condition.  Continue amlodipine 2.5 Mg daily    8. Dyslipidemia  - Lipid Profile; Future  - rosuvastatin (CRESTOR) 10 MG Tab; Take 1 Tablet by mouth every evening.  Dispense: 100 Tablet; Refill: 3  Chronic condition.  Current status unclear.  Lab test ordered to check lipid panel.  As above recommended to reduce statin from 20 Mg daily to 10 Mg daily.    9.Conductive hearing loss, bilateral  Chronic stable condition.  Continue to wear hearing aids.  Audiology visit yearly                Time spent:   44   minutes     Reviewed medical records including:  October 9, 2024 neurology  note  September 25, 2024 orthopedics note  August 28, 2024 orthopedics note  August 22, 2024 medical office note  August 19, 2024 orthopedic note  July 16, 2024 orthopedics note  Imaging report CT PET November 21, 2024, November 13, 2024, November 2, 2024, August 28, 2024, August 6, 2024, July 3, 2024    That includes face to face time and non-face to face time.  Chart review before the visit, the actual patient visit, and time spent on documentation in the EMR after the visit.  Chart review/prep, review of other providers' records, Independent review of imagings/labs ,  time for history/examination , pt's counseling/education, ordering, prescribing, treatment plan discussed with patient, and care coordination.              Please note that this dictation was created using voice recognition software. I have made every reasonable attempt to correct obvious errors, but I expect that there are errors of grammar and possibly content that I did not discover before finalizing the note.    Thom Bose MD  Internal Medicine  [facility]

## 2024-12-11 NOTE — ASSESSMENT & PLAN NOTE
Chronic condition.  Patient currently taking vitamin D supplementation.  Lab test ordered for follow-up.  No new symptoms reported.

## 2024-12-11 NOTE — ASSESSMENT & PLAN NOTE
This is a chronic condition.  Chart review showed patient with elevated liver enzyme.  Liver ultrasound also completed which was unremarkable    IMPRESSION:     1. No gallstone. No biliary dilatation.

## 2024-12-11 NOTE — ASSESSMENT & PLAN NOTE
Chronic condition.  Status post prostatectomy 2013.  Patient presently followed by urology service.  The most recent PSA 0.02.  The patient denies dysuria or hematuria.

## 2024-12-11 NOTE — ASSESSMENT & PLAN NOTE
Chronic condition.  The patient presently taking rosuvastatin.  20 Mg daily.  Chart reviewed with the patient.  Patient asymptomatic.    Lab test requested for follow-up.

## 2024-12-11 NOTE — ASSESSMENT & PLAN NOTE
Chronic condition.  The patient currently taking omeprazole.  Patient denies nausea vomiting or dysphagia.

## 2025-04-14 ENCOUNTER — OFFICE VISIT (OUTPATIENT)
Dept: NEUROLOGY | Facility: MEDICAL CENTER | Age: 70
End: 2025-04-14
Attending: PSYCHIATRY & NEUROLOGY
Payer: MEDICARE

## 2025-04-14 VITALS
TEMPERATURE: 97.3 F | HEART RATE: 66 BPM | OXYGEN SATURATION: 97 % | SYSTOLIC BLOOD PRESSURE: 106 MMHG | DIASTOLIC BLOOD PRESSURE: 58 MMHG | HEIGHT: 64 IN | BODY MASS INDEX: 23.94 KG/M2 | WEIGHT: 140.21 LBS

## 2025-04-14 DIAGNOSIS — G31.84 AMNESTIC MCI (MILD COGNITIVE IMPAIRMENT WITH MEMORY LOSS): ICD-10-CM

## 2025-04-14 DIAGNOSIS — H90.3 SENSORINEURAL HEARING LOSS (SNHL) OF BOTH EARS: ICD-10-CM

## 2025-04-14 DIAGNOSIS — R79.89 ELEVATED LFTS: ICD-10-CM

## 2025-04-14 DIAGNOSIS — E55.9 VITAMIN D DEFICIENCY: Chronic | ICD-10-CM

## 2025-04-14 PROBLEM — R19.5 ELEVATED FECAL CALPROTECTIN: Status: ACTIVE | Noted: 2025-04-14

## 2025-04-14 PROBLEM — R97.20 ELEVATED PROSTATE SPECIFIC ANTIGEN (PSA): Status: ACTIVE | Noted: 2025-04-14

## 2025-04-14 PROBLEM — I10 PRIMARY HYPERTENSION: Status: ACTIVE | Noted: 2025-04-14

## 2025-04-14 PROBLEM — K63.3 ULCER OF INTESTINE: Status: ACTIVE | Noted: 2025-04-14

## 2025-04-14 PROBLEM — K21.9 GASTRO-ESOPHAGEAL REFLUX DISEASE WITHOUT ESOPHAGITIS: Status: ACTIVE | Noted: 2025-04-14

## 2025-04-14 PROBLEM — K29.50 CHRONIC GASTRITIS: Status: ACTIVE | Noted: 2025-04-14

## 2025-04-14 PROBLEM — K64.5 THROMBOSED EXTERNAL HEMORRHOIDS: Status: ACTIVE | Noted: 2025-04-14

## 2025-04-14 PROBLEM — K56.609 INTESTINAL OBSTRUCTION (HCC): Status: ACTIVE | Noted: 2025-04-14

## 2025-04-14 PROBLEM — K64.0 FIRST DEGREE HEMORRHOIDS: Status: ACTIVE | Noted: 2024-04-01

## 2025-04-14 PROBLEM — C61 CARCINOMA OF PROSTATE (HCC): Status: ACTIVE | Noted: 2023-09-20

## 2025-04-14 PROBLEM — R19.5 OTHER FECAL ABNORMALITIES: Status: ACTIVE | Noted: 2025-04-14

## 2025-04-14 PROBLEM — D50.8 OTHER IRON DEFICIENCY ANEMIAS: Status: ACTIVE | Noted: 2025-04-14

## 2025-04-14 PROBLEM — D50.0 IRON DEFICIENCY ANEMIA SECONDARY TO BLOOD LOSS (CHRONIC): Status: ACTIVE | Noted: 2025-04-14

## 2025-04-14 PROBLEM — K92.1 HEMATOCHEZIA: Status: ACTIVE | Noted: 2025-04-14

## 2025-04-14 PROBLEM — K22.89 OTHER SPECIFIED DISEASE OF ESOPHAGUS: Status: ACTIVE | Noted: 2024-04-01

## 2025-04-14 PROBLEM — K63.89 OTHER SPECIFIED DISEASES OF INTESTINE: Status: ACTIVE | Noted: 2024-04-01

## 2025-04-14 PROBLEM — E61.1 IRON DEFICIENCY: Status: ACTIVE | Noted: 2025-04-14

## 2025-04-14 PROBLEM — R12 HEARTBURN: Status: ACTIVE | Noted: 2025-04-14

## 2025-04-14 PROCEDURE — 99214 OFFICE O/P EST MOD 30 MIN: CPT | Performed by: PSYCHIATRY & NEUROLOGY

## 2025-04-14 PROCEDURE — 3078F DIAST BP <80 MM HG: CPT | Performed by: PSYCHIATRY & NEUROLOGY

## 2025-04-14 PROCEDURE — 3074F SYST BP LT 130 MM HG: CPT | Performed by: PSYCHIATRY & NEUROLOGY

## 2025-04-14 PROCEDURE — 99212 OFFICE O/P EST SF 10 MIN: CPT | Performed by: PSYCHIATRY & NEUROLOGY

## 2025-04-14 RX ORDER — MEMANTINE HYDROCHLORIDE 7 MG/1
CAPSULE, EXTENDED RELEASE ORAL
Qty: 60 CAPSULE | Refills: 5 | Status: SHIPPED | OUTPATIENT
Start: 2025-04-14 | End: 2025-04-15

## 2025-04-14 ASSESSMENT — PATIENT HEALTH QUESTIONNAIRE - PHQ9: CLINICAL INTERPRETATION OF PHQ2 SCORE: 0

## 2025-04-14 ASSESSMENT — FIBROSIS 4 INDEX: FIB4 SCORE: 2

## 2025-04-14 NOTE — PROGRESS NOTES
"Neuro follow up:    Last visit with me in 10/2024    Reason: Amnestic Mild Cognitive Impairment     Here with wife.  Problem List Reviewed today.     Since my visit with Christophe, he had a formal Neuropsychological evaluation in April 2024 with results as below:     \"   Impression: The patient's cognitive profile revealed deficits in aspects of language, attention/working memory, processing speed, and executive functioning coupled with variable memory performance. 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 Etiology is not entirely clear. Considering his medical history of vascular risk factors and frontal-subcortical pattern of decline (executive functioning, attention/working memory, and processing speed deficits), cerebrovascular contributions are possible. However, his brain MRI did not show advanced chronic microvascular ischemic changes. The reported prominent memory decline, naming deficit, and poor rote verbal and visual memory performance remain concerning for incipient underlying Alzheimer's disease (AD). However, his intact memory for stories is not consistent with AD. Additional neurodiagnostic studies (e.g., PET-CT and CSF analysis) and a repeat neuropsychological evaluation in the future may aid diagnostic clarification. Other possible contributing factors to his cognitive difficulties include hearing problems (does not wear hearing aids consistently) and daytime fatigue related to anemia. This evaluation may serve as a baseline for longitudinal tracking.\"        Christophe Mc Rizo 69  y.o. right handed [facility] gentleman with HTN  who graduated with a bachelor of fine arts in the [employer] and moved to [place] in 1976. He was a  and in 1982 joined the [employer] and left the [employer] in 2004.        He has noticed for the last 3 years  that he will periodically look for an " "object anywhere in his house and once he gets to that location will think  Why he is going there or sometimes a specific item in house. He has very infrequently had difficulty recalling a street name; he has never gotten lost in a familiar or unfamiliar situation.  The above issue(s) have not rapidly progressed or become more noticeable to him or via his own comments about his wife's view of his subjective symptoms.     Since the last visit with me in March 2023  he is repeating himself within a few minutes he can say \"what is that again, what is that again\" and this occurs daily. He also asks the same question over again. Typically, when his wife gives him the answer he will get the correct answer.      He is aware of periodic difficulty recognizing when he met someone in the prior weeks.     He is recognizing that he may infrequently forget a personal item about once a week.     He adamantly denies any problems getting lost or having any accidents or incidents when driving in the last 6 months.     I reviewed his overall ADL's today and he been able to take care of own med(s), his finances, his self hygiene and he is very confident in his ability to take care of himself.     He has not noticed any changes in his walking,balance,abilty to read or write in the recent months.     There is no  history of seizure(s) or seizure like episodes known to have occurred nor described to have happened at any time in his life.     There are no  history of concussion(s).     No history of TIA or stroke like events known to have occurred.    There are no involuntary movements, progressive gait-balance difficulties, falls or near falls in the recent months.     No REM Sleep Behavioral symptoms or alison insomnia (averaging 8 hours of sleep most night awakening around 6 am)- will take a brief nap for 1 hour which he has done for many years since he retired in 2004.    ROS: Grandfather (mother's side)-  Late onset Dementia (onset in " early 70's)      Patient Active Problem List    Diagnosis Date Noted    Vitamin D deficiency 12/11/2024    Gastroesophageal reflux disease without esophagitis 12/11/2024    Liver enzyme elevation 12/11/2024    Prediabetes 12/11/2024    Amnestic MCI (mild cognitive impairment with memory loss) 10/09/2024    Sensorineural hearing loss (SNHL) of both ears 10/09/2024    Mild cognitive impairment with memory loss 10/06/2024    Incomplete rotator cuff tear or rupture of right shoulder, not specified as traumatic 07/16/2024    Dyslipidemia 02/02/2024    Blood in the stool 04/14/2023    Hx of hemorrhoids 04/14/2023    Conductive hearing loss, bilateral 05/13/2022    Strain of left supraspinatus muscle 05/13/2022    Chronic laryngitis 05/14/2021    Chronic cough 03/10/2021    Essential hypertension 05/22/2020    Multiple joint pain 05/22/2020    History of resection of small bowel 08/15/2019    Iron deficiency anemia 12/16/2015    Episodic lightheadedness 12/11/2015    History of prostate cancer 03/12/2015       Past medical history:   Past Medical History:   Diagnosis Date    Cancer (HCC) 2013    prostate    Dyslipidemia 2/2/2024    Essential hypertension 5/22/2020       Past surgical history:   Past Surgical History:   Procedure Laterality Date    PB SHLDR ARTHROSCOP,SURG,W/ROTAT CUFF REPB Right 8/20/2024    Procedure: RIGHT SHOULDER ARTHROSCOPY ROTATOR CUFF REPAIR;  Surgeon: Jose C Marinelli M.D.;  Location: [facility];  Service: Orthopedics    PB REPAIR BICEPS LONG TENDON Right 8/20/2024    Procedure: POSSIBLE RIGHT BICEPS TENOTOMY, POSSIBLE RIGHT OPEN SUBPECTORAL TENODESIS;  Surgeon: Jose C Marinelli M.D.;  Location: [facility];  Service: Orthopedics    CA LDR ARTHROSCOP PART DEBRIDE 1-2 Right 8/20/2024    Procedure: RIGHT LABRAL DEBRIDEMENT;  Surgeon: Jose C Marinelli M.D.;  Location: [facility];  Service: Orthopedics    CA LDR  ARTHROSCOP,PART ACROMIOPLAS Right 8/20/2024    Procedure: RIGHT SUBACROMIAL DECOMPRESSION, REPAIRS AS INDICATED;  Surgeon: Jose C Marinelli M.D.;  Location: [facility];  Service: Orthopedics    ELBOW EXPLORATION  2005    cubital tunnel    BOWEL RESECTION  1996    volvulus, small intestine    COLONOSCOPY  1996, 2009    neg    PROSTATECTOMY, RADIAL Bilateral          Social history:   Social History     Socioeconomic History    Marital status:      Spouse name: Not on file    Number of children: Not on file    Years of education: Not on file    Highest education level: Not on file   Occupational History    Not on file   Tobacco Use    Smoking status: Never    Smokeless tobacco: Never    Tobacco comments:     quit 1976,<1 pckyr   Vaping Use    Vaping status: Never Used   Substance and Sexual Activity    Alcohol use: Not Currently     Comment: weekend    Drug use: No    Sexual activity: Yes     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: , manufacturing   Other Topics Concern    Not on file   Social History Narrative    Not on file     Social Drivers of Health     Financial Resource Strain: Not on file   Food Insecurity: Not on file   Transportation Needs: Not on file   Physical Activity: Not on file   Stress: Not on file   Social Connections: Not on file   Intimate Partner Violence: Not on file   Housing Stability: Not on file       Family history:   Family History   Problem Relation Age of Onset    Cancer Father         prostate, age 90    Stroke Mother     Diabetes Neg Hx     Heart Disease Neg Hx          Current medications:   Current Outpatient Medications   Medication    omeprazole (PRILOSEC) 20 MG delayed-release capsule    amLODIPine (NORVASC) 5 MG Tab    lidocaine (LIDODERM) 5 % Patch    ondansetron (ZOFRAN) 4 MG Tab tablet    omeprazole (PRILOSEC) 20 MG delayed-release capsule    rosuvastatin (CRESTOR) 10 MG Tab    lidocaine (XYLOCAINE) 5 % Ointment    amoxicillin-clavulanate  "(AUGMENTIN) 875-125 MG Tab    acetaminophen (TYLENOL) 325 MG Tab    Nutritional Supplements (ENSURE ACTIVE PO)    Ascorbic Acid (VITAMIN C) 1000 MG Tab    vitamin D (CHOLECALCIFEROL) 1000 UNIT Tab    methylPREDNISolone (MEDROL DOSEPAK) 4 MG Tablet Therapy Pack    promethazine-dextromethorphan (PROMETHAZINE-DM) 6.25-15 MG/5ML syrup    cyclobenzaprine (FLEXERIL) 5 mg tablet    meloxicam (MOBIC) 15 MG tablet    oxyCODONE immediate-release (ROXICODONE) 5 MG Tab    polyethylene glycol-electrolytes (GAVILYTE-G) 236 GM Recon Soln    amLODIPine (NORVASC) 2.5 MG Tab    amLODIPine (NORVASC) 2.5 MG Tab    predniSONE (DELTASONE) 20 MG Tab    hydrocortisone 2.5 % Cream topical cream    hydrocortisone 2.5 % Cream topical cream    Hydrocortisone (PREPARATION H EX)    Ferrous Sulfate Dried (FERROUS SULFATE CR PO)     No current facility-administered medications for this visit.       Medication Allergy:  Allergies   Allergen Reactions    Aricept [Donepezil]     Asa [Aspirin]            Physical examination:   Vitals:    04/14/25 0753   BP: 106/58   BP Location: Left arm   Patient Position: Sitting   BP Cuff Size: Adult   Pulse: 66   Temp: 36.3 °C (97.3 °F)   TempSrc: Temporal   SpO2: 97%   Weight: 63.6 kg (140 lb 3.4 oz)   Height: 1.626 m (5' 4\")       Normal cephalic atraumatic.  There is full range of movement around the neck in all directions without restrictions or discrete pain evoked triggers.  No lower extremity edema.      Neurological  Exam:      [place] Cognitive Assessment (MOCA) Version 7.1    Years of Education: 4 years college ([employer])    TOTAL SCORE: 24/30  (to be scanned into the MEDIA section in the E.M.R.)          Mental status: Awake, alert and fully oriented to person, place, time, and situation. Normal attention and concentration.  Did not appear/act combative,irritable,anxious,paranoid/delusional or aggressive to or with me.    Speech and language: Speech is fluent without errors, clear, intact to " repetition, and intact to naming.     Follows 3 step motor commands in sequence without significant delay and correctly.    Cranial nerve exam:  II: Pupils are equally round and reactive to light. Visual fields are intact by confrontation.  III, IV, VI: EOMI, no diplopia, no ptosis.  V: Sensation to light touch is normal over V1-3 distributions bilaterally.  .  VII: Facial movements are symmetrical. There is no facial droop. .  VIII: Hearing intact to soft speech and finger rub bilaterally  IX: Palate elevates symmetrically, uvula is midline. Dysarthria is not present.  XI: Shoulder shrug are symmetrical and strong.   XII: Tongue protrudes midline.      Motor exam:  Muscle tone is normal in all 4 limbs. and No abnormal movements appreciated.    Muscle strength:    Neck Flexors/Extensors: 5/5       Right  Left  Deltoid   5/5  5/5      Biceps   5/5  5/5  Triceps              5/5  5/5   Wrist extensors 5/5  5/5  Wrist flexors  5/5  5/5     5/5  5/5  Interossei  5/5  5/5  Thenar (APB)  5/5  5/5   Hip flexors  5/5  5/5  Quadriceps  5/5  5/5    Hamstrings  5/5  5/5  Dorsiflexors  5/5  5/5  Plantarflexors  5/5  5/5  Toe extension  5/5  5/5      Reflexes:       Right  Left  Biceps   2/4  2/4  Triceps              2/4  2/4  Brachioradialis 2/4  2/4  Knee jerk  2/4  2/4  Ankle jerk  2/4  2/4     Frontal release signs are absent    bilaterally toes are downgoing to plantar stimulation..    Coordination (finger-to-nose, heel/knee/shin, rapid alternating movements) was normal.     There was no ataxia, no tremors, and no dysmetria.     Station and gait were normal. Easily stands up from exam chair without retropulsion,veering,leaning,swaying (to either side).       Labs and Tests:    TSH  Order: [id number]   Status: Final result       Next appt: 04/15/2025 at 02:40 PM in [facility] (Thom Bose M.D.)       Dx: Amnestic MCI (mild cognitive impairme...    Test Result Released: Yes (seen)       Messages: Seen    0 Result  Notes       1 Patient Communication          Component  Ref Range & Units (hover) 6 mo ago 1 yr ago 4 yr ago 6 yr ago 10 yr ago   TSH 1.410 1.580 R, CM 1.200 R, CM 0.930 R, CM 2.010 R   Resulting Agency [facility]           VITAMIN B1  Order: [id number]   Status: Final result       Next appt: 04/15/2025 at 02:40 PM in [facility] (Thom Bose M.D.)       Dx: Amnestic MCI (mild cognitive impairme...    Test Result Released: Yes (seen)    0 Result Notes      Component  Ref Range & Units (hover) 6 mo ago   Vitamin B1 140   Comment: INTERPRETIVE INFORMATION: Vitamin B1, Whole Blood  This assay measures the concentration of thiamine diphosphate  (TDP), the primary active form of vitamin B1. Approximately 90  percent of vitamin B1 present in whole blood is TDP. Thiamine and  thiamine monophosphate, which comprise the remaining 10 percent,  are not measured.  This test was developed and its performance characteristics  determined by [facility]. It has not been cleared or  approved by the US Food and Drug Administration. This test was  performed in a CLIA certified laboratory and is intended for  clinical purposes.  Performed By: [facility]  [address] [place] [id number]  : Sen Mayo MD, PhD  CLIA Number: [id number]   [facility]        FOLATE SERUM/PLASMA  Order: [id number] - Reflex for Order [id number]   Status: Final result       Next appt: 04/15/2025 at 02:40 PM in [facility] (Thom Bose M.D.)    Test Result Released: Yes (seen)       Messages: Seen    0 Result Notes       1 Patient Communication      Component  Ref Range & Units (hover) 6 mo ago   Folate -Folic Acid 17.9   Comment: The hemolysis index of the specimen exceeds the allowed tolerance for the  test.  Result may be affected.  Specimen recollection is recommended to  confirm the result.   Resulting Agency              Specimen Collected: 10/10/24  7:32 AM Last Resulted: 10/10/24 11:59 PM        VITAMIN B12  Order: [id number]   Status: Final result       Next appt: 04/15/2025 at 02:40 PM in [facility] (Thom Bose M.D.)    Test Result Released: Yes (seen)       Messages: Seen    0 Result Notes       1 Patient Communication       Component  Ref Range & Units (hover) 6 mo ago 4 yr ago   Vitamin B12 -True Cobalamin 447 474   Resulting [facility]             Specimen Collected: 10/10/24  7:32 AM Last Resulted: 10/10/24 11:59 PM            Contains abnormal data VITAMIN D,25 HYDROXY (DEFICIENCY)  Order: [id number]   Status: Final result       Next appt: 04/15/2025 at 02:40 PM in [facility] (Thom Bose M.D.)       Dx: Amnestic MCI (mild cognitive impairme...    Test Result Released: Yes (seen)       Messages: Seen    0 Result Notes       1 Patient Communication        Component  Ref Range & Units (hover) 6 mo ago 6 yr ago 8 yr ago   25-Hydroxy   Vitamin D 25 27 Low  25 Low  CM 30 CM   Comment: Adult Ranges:   <20 ng/mL - Deficiency  20-29 ng/mL - Insufficiency   ng/mL - Sufficiency  Electrochemiluminescence binding assay performed using Roche karthikeyan e  immunoassay analyzer.  The Elecsys Vitamin D total II assay is intended for  the quantitative determination of total 25 hydroxyvitamin D in human serum  and plasma. This assay is to be used as an aid in the assessment of vitamin  D sufficiency in adults.   Resulting Agency [facility]             Specimen Collected: 10/10/24  7:32 AM Last Resulted: 10/10/24 11:59 PM       Reading Physician Reading Date Result Priority   Rd Whipple M.D.  [phone]     11/21/2024      Narrative & Impression     11/21/2024 12:20 PM     HISTORY/REASON FOR EXAM:  concern for Alzheimer's Disease based on progressive amnestic syndrome over 2-3 years or longer; concern for Alzheimer's Disease based on progressive amnestic syndrome over 2-3 years or longer  Progressive dementia and amnesia     TECHNIQUE/EXAM DESCRIPTION AND NUMBER OF VIEWS:  PET CT Metabolic  evaluation of the brain.     Initially, 11.78 mCi F-18 FDG was administered intravenously under standardized conditions. Approximately 30 minutes after FDG administration, the patient was placed in the supine position on the PET CT table for brain imaging. CT images, PET images and   fused CT/PET images were reviewed on a computer workstation. Blood glucose level was 56 mg/dL. The low dose unenhanced CT images were used for attenuation correction and anatomic correlation only.        COMPARISON: None.     FINDINGS:     There is scattered mild symmetric hypometabolism, not suggestive of a particular neurodegenerative disease, possibly due to normal aging.  There is decreased activity in the medial temporal lobes bilaterally.  Brain volume: Brain volume is normal for age.  Additional findings: None. .     IMPRESSION:        1. Abnormal FDG uptake in the brain, however pattern is not characteristic of a particular neurodegenerative disease.        Exam Ended: 11/21/24  1:50 PM Last Resulted: 11/21/24  2:14 PM         omponent  Ref Range & Units (hover) 6 mo ago 7 mo ago 8 mo ago 1 yr ago 2 yr ago 4 yr ago 6 yr ago   Alkaline Phosphatase 64 75 71 65 66 69 63   AST(SGOT) 57 High  60 High  61 High  31 21 22 19   ALT(SGPT) 74 High  110 High  90 High  31 16 25 22   Total Bilirubin 0.7 0.5 0.4 0.3 0.6 0.3 0.6   Direct Bilirubin 0.3 <0.2        Indirect Bilirubin 0.4 see below CM        Albumin 4.1 4.3 4.1 4.2 4.3 4.1 4.1   Total Protein 7.0 7.2 7.1 7.2 7.3 6.9 7.3   Resulting Agency M M M M M M M     Impression:    Amnestic Mild Cognitive Impairment.    Primarily issues with locating certain items but without consistently losing personal items.     Alzheimer's Disease is clearly in the differential diagnosis given the amnestic presentation over multiple years.     There has been on evolving or ongoing psychosis or gait-balance (parkinsonian symptoms/signs) presently or evolving.      There are no  features of a rapidly  "progressive neuropsychiatric condition and there have not been any behavioral issues evolving.     MOCA of 24/30- see media section  for specifics.     Alzheimer's Questionnaire per wife today of 23 (see media section for specifics)- this score was 21 at prior visit in 10/2024.     Functional Activity Questionnaire score per wife today of 9 per wife (see media section)        Christophe does not at all feel excessive anxious nor depressed in the recent months.     He does feel his wife is aware of his concerns but from his own view she was not directly commenting to him about the issues he raised before he asked her about what he was experienced.           Today, I reviewed the clinical criteria and reviewed several  scenarios of the differences being using the medical terms of normal brain aging (age associated memory impairment),  Mild Cognitive Impairment (MCI) and Dementia.    MCI is a syndrome but statistically and for the majority of patients  occurs due  to a more rapid aging of the brain tissue or potentially from injury to certain parts of one's brain ( often from such contributing factors as  the cumulative effects of alcohol, from one or more ischemic or hemmorhagic stroke(s), from neurodegeneration or long standing with/without suboptimally controlled Hypertension). It is for some of these potential factors as to why I recommend a brain imaging test (Head CT or Brain MRI) be done for the 1st time or in certain circumstances repeated for comparison purposes  as such imaging can suggest one or more factors as to the reason(s) for the person's cognitive/memory changes. In fact, a normal or \"age related\" finding on a brain imaging test in and of itself is useful clinical and objective information to have in the evaluation of a person who has either an acute, evolving or even chronic (months to years) long cognitive/memory complaint.     Additional factors or contributors to Brain Health issues can be summarized " in several books/references which I discussed as well today.      Goals going forwards include:     A. Paying attention to one's risk factors and reducing their prevalence or potential impact on one's changing memory/thinking> an excellent example would be to stop smoking, reduce or eliminate alcohol use (depending on the amount and frequency of usage), maintain good blood pressure control by buying a digital arm blood pressure cuff such as an OMRON Series 3 or 5 and checking one's blood pressure atleast 3 days per week (in the am and early afternoon) that the numbers are under 140/90 and working as needed with the primary care doctor  to optimize blood pressure control).        B. Encouraging proper sleep hygiene which for most adults is 7 to 8 hours of uninterrupted sleep per night.   (he feels that his sleep has been quite good and without any recent insomnia or excessive daytime sleepiness.     C. Encouraging some form of exercise preferable aerobic forms to be done (4 to 5 days per week- 30 minutes minimum per day)> 150 minutes per week as a goal. Example activities could include fast walking (up a slight incline), jogging, cycling (road or stationary), swimming,tennis. Essentially, even basic walking on a flat surface or a treadmill would be better than doing nothing.     D. Maintaining or forming new social contacts with family and friends  and a positive attitude despite the concerns and/or ongoing issues with thinking and/or memory.     E. Eating well which means a diet low in salt  (under 2 grams per day), sugar and saturated fat.     F. Maintaining one's BMI (Body Mass Index) under 30.     ANNA Saeed has another (repeat) Neuropsychological testing with Dr. Banegas PhD scheduled for this August.     H.  Continue Vitamin D3 supplementation.     J.  Aricept caused him to be lethargic with nausea (periodic emesis) at a dose of 5 mg a day and he has been off this medication for 4-5 months (took 5 mg for nearly 1  month)    Will start Namenda 7 mg ER (once a day) in am after discussion of the pros and cons of such medication(s).    I told Christophe not to start this until Liver Enzyme issue is better clarified.     K. We also had  an extensive discussion today about the anti amyloid medications available (Leqembi and Kisunla) and the risks of ARIA H and E as well as accelerated brain atrophy vs the small absolute benefit (clinical) of such medications and after answering questions posed by Christophe and his wife, the decision was NOT to proceed with such medicaitons.     L. Discussed lifestyle factors and importance of reducing sugary foods in your diet.     M. [employer] AD2 blood testing to be considered and information given to Christophe and his wife about this company and this test.     N. He is seeing Dr. Bose (PCP) and to discuss prior LFT elevation(s).     I have performed  a history and physical exam and a directed /focused  ROS today.    Total time spent today or this patient's care was  35   and included reviewing  the diagnostic workup to date (such as labs and imaging as well as interpreting such tests relevant to this patient's neurological condition),  reviewing/obtaining separately obtained history from Christophe   for today's neurological problem(s) ,counseling and educating the patient and family member on issues related to cognition/memory and cognitive health factors and documenting  the clinical information in the EMR.     Follow up with me in 6 months or so.         Sen Reed MD  [place] [facility].   [facility]

## 2025-04-15 ENCOUNTER — OFFICE VISIT (OUTPATIENT)
Dept: MEDICAL GROUP | Facility: PHYSICIAN GROUP | Age: 70
End: 2025-04-15
Payer: MEDICARE

## 2025-04-15 VITALS
OXYGEN SATURATION: 96 % | SYSTOLIC BLOOD PRESSURE: 128 MMHG | TEMPERATURE: 98.8 F | BODY MASS INDEX: 23.83 KG/M2 | HEART RATE: 82 BPM | HEIGHT: 64 IN | DIASTOLIC BLOOD PRESSURE: 82 MMHG | RESPIRATION RATE: 16 BRPM | WEIGHT: 139.6 LBS

## 2025-04-15 DIAGNOSIS — Z85.46 HISTORY OF PROSTATE CANCER: ICD-10-CM

## 2025-04-15 DIAGNOSIS — K21.9 GASTROESOPHAGEAL REFLUX DISEASE WITHOUT ESOPHAGITIS: Chronic | ICD-10-CM

## 2025-04-15 DIAGNOSIS — I25.119 CORONARY ARTERY DISEASE INVOLVING NATIVE CORONARY ARTERY OF NATIVE HEART WITH ANGINA PECTORIS (HCC): ICD-10-CM

## 2025-04-15 DIAGNOSIS — I10 ESSENTIAL HYPERTENSION: Chronic | ICD-10-CM

## 2025-04-15 DIAGNOSIS — M54.50 ACUTE LOW BACK PAIN, UNSPECIFIED BACK PAIN LATERALITY, UNSPECIFIED WHETHER SCIATICA PRESENT: ICD-10-CM

## 2025-04-15 DIAGNOSIS — N20.0 KIDNEY STONE: ICD-10-CM

## 2025-04-15 DIAGNOSIS — G31.84 AMNESTIC MCI (MILD COGNITIVE IMPAIRMENT WITH MEMORY LOSS): Chronic | ICD-10-CM

## 2025-04-15 DIAGNOSIS — E78.5 DYSLIPIDEMIA: ICD-10-CM

## 2025-04-15 DIAGNOSIS — R74.8 LIVER ENZYME ELEVATION: Chronic | ICD-10-CM

## 2025-04-15 DIAGNOSIS — R73.03 PREDIABETES: Chronic | ICD-10-CM

## 2025-04-15 PROBLEM — R79.89 ELEVATED LFTS: Status: RESOLVED | Noted: 2025-04-14 | Resolved: 2025-04-15

## 2025-04-15 PROBLEM — D50.0 IRON DEFICIENCY ANEMIA SECONDARY TO BLOOD LOSS (CHRONIC): Status: RESOLVED | Noted: 2025-04-14 | Resolved: 2025-04-15

## 2025-04-15 PROBLEM — K29.50 CHRONIC GASTRITIS: Status: RESOLVED | Noted: 2025-04-14 | Resolved: 2025-04-15

## 2025-04-15 PROBLEM — K22.89 OTHER SPECIFIED DISEASE OF ESOPHAGUS: Status: RESOLVED | Noted: 2024-04-01 | Resolved: 2025-04-15

## 2025-04-15 PROBLEM — H90.3 SENSORINEURAL HEARING LOSS, BILATERAL: Chronic | Status: ACTIVE | Noted: 2025-04-14

## 2025-04-15 PROBLEM — K64.0 FIRST DEGREE HEMORRHOIDS: Status: RESOLVED | Noted: 2024-04-01 | Resolved: 2025-04-15

## 2025-04-15 PROBLEM — K63.3 ULCER OF INTESTINE: Status: RESOLVED | Noted: 2025-04-14 | Resolved: 2025-04-15

## 2025-04-15 PROBLEM — D50.8 OTHER IRON DEFICIENCY ANEMIAS: Status: RESOLVED | Noted: 2025-04-14 | Resolved: 2025-04-15

## 2025-04-15 PROBLEM — R19.5 ELEVATED FECAL CALPROTECTIN: Status: RESOLVED | Noted: 2025-04-14 | Resolved: 2025-04-15

## 2025-04-15 PROBLEM — K92.1 HEMATOCHEZIA: Status: RESOLVED | Noted: 2025-04-14 | Resolved: 2025-04-15

## 2025-04-15 PROBLEM — R19.5 OTHER FECAL ABNORMALITIES: Status: RESOLVED | Noted: 2025-04-14 | Resolved: 2025-04-15

## 2025-04-15 PROBLEM — R79.89 ABNORMAL LIVER FUNCTION TESTS: Status: RESOLVED | Noted: 2025-04-14 | Resolved: 2025-04-15

## 2025-04-15 PROBLEM — R97.20 ELEVATED PROSTATE SPECIFIC ANTIGEN (PSA): Status: RESOLVED | Noted: 2025-04-14 | Resolved: 2025-04-15

## 2025-04-15 PROBLEM — K63.89 OTHER SPECIFIED DISEASES OF INTESTINE: Status: RESOLVED | Noted: 2024-04-01 | Resolved: 2025-04-15

## 2025-04-15 PROBLEM — Z87.19 HX OF HEMORRHOIDS: Status: RESOLVED | Noted: 2023-04-14 | Resolved: 2025-04-15

## 2025-04-15 PROBLEM — K64.5 THROMBOSED EXTERNAL HEMORRHOIDS: Status: RESOLVED | Noted: 2025-04-14 | Resolved: 2025-04-15

## 2025-04-15 PROBLEM — C61 CARCINOMA OF PROSTATE (HCC): Status: RESOLVED | Noted: 2023-09-20 | Resolved: 2025-04-15

## 2025-04-15 PROBLEM — K92.1 BLOOD IN THE STOOL: Status: RESOLVED | Noted: 2023-04-14 | Resolved: 2025-04-15

## 2025-04-15 PROBLEM — K56.609 INTESTINAL OBSTRUCTION (HCC): Status: RESOLVED | Noted: 2025-04-14 | Resolved: 2025-04-15

## 2025-04-15 PROCEDURE — 3079F DIAST BP 80-89 MM HG: CPT | Performed by: INTERNAL MEDICINE

## 2025-04-15 PROCEDURE — 99215 OFFICE O/P EST HI 40 MIN: CPT | Performed by: INTERNAL MEDICINE

## 2025-04-15 PROCEDURE — 3074F SYST BP LT 130 MM HG: CPT | Performed by: INTERNAL MEDICINE

## 2025-04-15 RX ORDER — GABAPENTIN 100 MG/1
100 CAPSULE ORAL 2 TIMES DAILY
Qty: 60 CAPSULE | Refills: 1 | Status: SHIPPED | OUTPATIENT
Start: 2025-04-15

## 2025-04-15 RX ORDER — PREDNISONE 10 MG/1
TABLET ORAL
Qty: 20 TABLET | Refills: 0 | Status: SHIPPED | OUTPATIENT
Start: 2025-04-15 | End: 2025-04-23

## 2025-04-15 ASSESSMENT — FIBROSIS 4 INDEX: FIB4 SCORE: 2

## 2025-04-15 NOTE — ASSESSMENT & PLAN NOTE
Chronic condition.  This was noted with CT imaging August 2024 when the patient was in the ER.  Patient was not aware of this condition.  Patient denies chest pain shortness of breath palpitation or near syncope.

## 2025-04-15 NOTE — ASSESSMENT & PLAN NOTE
Chronic condition.  The patient presently taking amlodipine.  Blood pressure has been well-controlled.  No significant side effects reported.

## 2025-04-15 NOTE — ASSESSMENT & PLAN NOTE
Chronic condition.   Lab test show patient with mildly elevated liver enzyme.  Previous liver ultrasound completed November 2024 showed    IMPRESSION:     1. No gallstone. No biliary dilatation.    Currently asymptomatic.  Lab test requested for follow-up.

## 2025-04-15 NOTE — ASSESSMENT & PLAN NOTE
Chronic condition.  The patient presently followed by a neurologist Dr. Reed      Patient was given the information by neurologist regarding medication memantine.  Patient stated that he has not decided whether or not to take the medication

## 2025-04-15 NOTE — PROGRESS NOTES
PRIMARY CARE CLINIC VISIT        Chief Complaint   Patient presents with    Back Pain     Lower x 1 month      Low back pain  History prostate cancer  Follow-up hypertension  Acid reflux  Elevated liver enzyme  Prediabetes  Amnestic mild cognitive impairment  Coronary artery disease  Kidney stone  Hyperlipidemia      History of Present Illness     Acute low back pain  New condition.  The patient reported low back pain started approximately 4 weeks ago.  Reported that he has been very active at home including heavy lifting, cutting trees, mowing the lawn etc.  Denies recent trauma or injury.  Patient denies bowel or bladder dysfunction.  Denies motor weakness.  The pain is in the low back region.  Pain currently rated 5/10 in intensity.    Coronary artery disease involving native coronary artery of native heart with angina pectoris (HCC)  Chronic condition.  This was noted with CT imaging August 2024 when the patient was in the ER.  Patient was not aware of this condition.  Patient denies chest pain shortness of breath palpitation or near syncope.    Kidney stone  Chronic condition  Noted with previous CT imaging.  Patient currently asymptomatic.  He denied fever chill dysuria or hematuria.    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  The patient presently followed by a neurologist Dr. Reed      Patient was given the information by neurologist regarding medication memantine.  Patient stated that he has not decided whether or not to take the medication    History of prostate cancer  Chronic condition.  Status post prostatectomy.  Patient followed by urology service.  PSA October 2024 was  0.02 stable compared to previous result.  Patient denies dysuria or hematuria.    Essential hypertension  Chronic condition.  The patient presently taking amlodipine.  Blood pressure has been well-controlled.  No significant side effects reported.    Dyslipidemia  Chronic condition.  The patient  currently on diet therapy  Patient is due for lab test.    Gastroesophageal reflux disease without esophagitis  This is a chronic condition.  The patient is presently taking omeprazole.  The patient denies nausea vomiting or dysphagia.    Liver enzyme elevation  Chronic condition.   Lab test show patient with mildly elevated liver enzyme.  Previous liver ultrasound completed November 2024 showed    IMPRESSION:     1. No gallstone. No biliary dilatation.    Currently asymptomatic.  Lab test requested for follow-up.    Current Outpatient Medications on File Prior to Visit   Medication Sig Dispense Refill    lidocaine (LIDODERM) 5 % Patch Apply 1 Patch topically every 24 hours.      cyclobenzaprine (FLEXERIL) 5 mg tablet       meloxicam (MOBIC) 15 MG tablet Take 1 Tablet by mouth every day.      ondansetron (ZOFRAN) 4 MG Tab tablet TAKE 1 TABLET BY MOUTH EVERY 8 HOURS FOR UP TO 5 DAYS AS NEEDED FOR NAUSEA OR VOMITING OR POST OPERATIVE NAUSEA      omeprazole (PRILOSEC) 20 MG delayed-release capsule Take 1 Capsule by mouth every day. 100 Capsule 3    rosuvastatin (CRESTOR) 10 MG Tab Take 1 Tablet by mouth every evening. 100 Tablet 3    amLODIPine (NORVASC) 2.5 MG Tab       lidocaine (XYLOCAINE) 5 % Ointment APPLY PEAS SIZE AMOUNT OF OINTMENT IN THE ANAL CANAL THREE TIMES DAILY      hydrocortisone 2.5 % Cream topical cream       acetaminophen (TYLENOL) 325 MG Tab tablet      Hydrocortisone (PREPARATION H EX)       Ferrous Sulfate Dried (FERROUS SULFATE CR PO) 1 daily      Ascorbic Acid (VITAMIN C) 1000 MG Tab Take  by mouth.      vitamin D (CHOLECALCIFEROL) 1000 UNIT Tab Take 1,000 Units by mouth every day.       No current facility-administered medications on file prior to visit.        Allergies: Aricept [donepezil] and Asa [aspirin]    Current Outpatient Medications Ordered in Epic   Medication Sig Dispense Refill    predniSONE (DELTASONE) 10 MG Tab Take 4 Tablets by mouth every day for 2 days, THEN 3 Tablets every day  for 2 days, THEN 2 Tablets every day for 2 days, THEN 1 Tablet every day for 2 days. 20 Tablet 0    gabapentin (NEURONTIN) 100 MG Cap Take 1 Capsule by mouth 2 times a day. 60 Capsule 1    lidocaine (LIDODERM) 5 % Patch Apply 1 Patch topically every 24 hours.      cyclobenzaprine (FLEXERIL) 5 mg tablet       meloxicam (MOBIC) 15 MG tablet Take 1 Tablet by mouth every day.      ondansetron (ZOFRAN) 4 MG Tab tablet TAKE 1 TABLET BY MOUTH EVERY 8 HOURS FOR UP TO 5 DAYS AS NEEDED FOR NAUSEA OR VOMITING OR POST OPERATIVE NAUSEA      omeprazole (PRILOSEC) 20 MG delayed-release capsule Take 1 Capsule by mouth every day. 100 Capsule 3    rosuvastatin (CRESTOR) 10 MG Tab Take 1 Tablet by mouth every evening. 100 Tablet 3    amLODIPine (NORVASC) 2.5 MG Tab       lidocaine (XYLOCAINE) 5 % Ointment APPLY PEAS SIZE AMOUNT OF OINTMENT IN THE ANAL CANAL THREE TIMES DAILY      hydrocortisone 2.5 % Cream topical cream       acetaminophen (TYLENOL) 325 MG Tab tablet      Hydrocortisone (PREPARATION H EX)       Ferrous Sulfate Dried (FERROUS SULFATE CR PO) 1 daily      Ascorbic Acid (VITAMIN C) 1000 MG Tab Take  by mouth.      vitamin D (CHOLECALCIFEROL) 1000 UNIT Tab Take 1,000 Units by mouth every day.       No current [facility]-ordered facility-administered medications on file.       Past Medical History:   Diagnosis Date    Cancer (HCC) 2013    prostate    Dyslipidemia 8/10/2010    Essential hypertension 5/22/2020       Past Surgical History:   Procedure Laterality Date    PB SHLDR ARTHROSCOP,SURG,W/ROTAT CUFF REPB Right 8/20/2024    Procedure: RIGHT SHOULDER ARTHROSCOPY ROTATOR CUFF REPAIR;  Surgeon: Jose C Marinelli M.D.;  Location: [facility] [place];  Service: Orthopedics    PB REPAIR BICEPS LONG TENDON Right 8/20/2024    Procedure: POSSIBLE RIGHT BICEPS TENOTOMY, POSSIBLE RIGHT OPEN SUBPECTORAL TENODESIS;  Surgeon: Jose C Marinelli M.D.;  Location: [facility];  Service: Orthopedics    HI  SHLDR ARTHROSCOP PART DEBRIDE 1-2 Right 8/20/2024    Procedure: RIGHT LABRAL DEBRIDEMENT;  Surgeon: Jose C Marinelli M.D.;  Location: [facility] [place];  Service: Orthopedics    GA SHLDR ARTHROSCOP,PART ACROMIOPLAS Right 8/20/2024    Procedure: RIGHT SUBACROMIAL DECOMPRESSION, REPAIRS AS INDICATED;  Surgeon: Jose C Marinelli M.D.;  Location: [facility] [place];  Service: Orthopedics    ELBOW EXPLORATION  2005    cubital tunnel    BOWEL RESECTION  1996    volvulus, small intestine    COLONOSCOPY  1996, 2009    neg    PROSTATECTOMY, RADIAL Bilateral        Family History   Problem Relation Age of Onset    Cancer Father         prostate, age 90    Stroke Mother     Diabetes Neg Hx     Heart Disease Neg Hx        Social History     Tobacco Use   Smoking Status Never   Smokeless Tobacco Never   Tobacco Comments    quit 1976,<1 pckyr       Social History     Substance and Sexual Activity   Alcohol Use Not Currently    Comment: weekend       Review of systems  As per HPI above. All other systems reviewed and negative.      Past Medical, Social, and Family history reviewed and updated in [facility]       LAB DATA:     I have independently reviewed / interpreted labs    Lab Results   Component Value Date/Time    HBA1C 5.7 (H) 04/27/2018 07:10 AM    HBA1C 6.0 (H) 12/12/2015 07:22 AM    HBA1C 5.4 05/10/2014 07:14 AM    HBA1C 6.2 (H) 09/19/2013 10:58 AM        Lab Results   Component Value Date/Time    WBC 7.4 08/06/2024 04:46 PM    HEMOGLOBIN 14.3 08/06/2024 04:46 PM    HEMATOCRIT 45.0 08/06/2024 04:46 PM    MCV 80.5 (L) 08/06/2024 04:46 PM    PLATELETCT 235 08/06/2024 04:46 PM       Lab Results   Component Value Date/Time    SODIUM 139 08/06/2024 04:46 PM    POTASSIUM 3.9 08/06/2024 04:46 PM    GLUCOSE 125 (H) 08/06/2024 04:46 PM    BUN 9 08/06/2024 04:46 PM    CREATININE 0.61 08/06/2024 04:46 PM    CREATININE 0.92 04/09/2010 08:12 AM       Lab Results   Component Value Date/Time    CHOLSTRLTOT  "104 10/19/2023 06:35 AM    TRIGLYCERIDE 59 10/19/2023 06:35 AM    HDL 51 10/19/2023 06:35 AM    LDL 41 10/19/2023 06:35 AM       Lab Results   Component Value Date/Time    ALTSGPT 74 (H) 10/04/2024 07:23 AM          Objective     /82 (BP Location: Left arm, Patient Position: Sitting, BP Cuff Size: Adult)   Pulse 82   Temp 37.1 °C (98.8 °F) (Temporal)   Resp 16   Ht 1.626 m (5' 4\")   Wt 63.3 kg (139 lb 9.6 oz)   SpO2 96%    Body mass index is 23.96 kg/m².    General: alert in no apparent distress.  Cardiovascular: regular rate and rhythm  Pulmonary: lungs : no wheezing   Gastrointestinal: BS present.   Low back: No significant spinal deformity noted.   Mild spasm noted w palpation of the paravertebral region          Assessment and Plan     1. Acute low back pain, unspecified back pain laterality, unspecified whether sciatica present  Acute condition.  Uncontrolled.  Rule out degenerative disc/osteoarthritis versus others    - DX-LUMBAR SPINE-4+ VIEWS; Future  - Referral to Physical Therapy  - predniSONE (DELTASONE) 10 MG Tab; Take 4 Tablets by mouth every day for 2 days, THEN 3 Tablets every day for 2 days, THEN 2 Tablets every day for 2 days, THEN 1 Tablet every day for 2 days.  Dispense: 20 Tablet; Refill: 0  - gabapentin (NEURONTIN) 100 MG Cap; Take 1 Capsule by mouth 2 times a day.  Dispense: 60 Capsule; Refill: 1  - Advised pt re: neck/ back care, posture and regular stretching exercises.   Rec to maintain ideal weight.   Ice/heat application as directed.  Avoid heavy lifting or activities which may aggravate pt's condition.   Consider the use of over the counter topical treatment such as  Biofreeze as needed       2. History of prostate cancer  Chronic condition.  Status post prostatectomy.  PSA 0.02.  Patient to continue follow-up with urologist as directed  Currently the patient asymptomatic    3. Essential hypertension  Stable.  Continue amlodipine 2.5 Mg daily.  Continue to monitor blood " pressure.    4. Gastroesophageal reflux disease without esophagitis  Chronic stable.  Continue omeprazole 20 Mg daily    5. Liver enzyme elevation  Chronic condition.  Current status unclear.  Lab test ordered for follow-up.  Patient asymptomatic.  Patient does not drink alcohol    - HEPATIC FUNCTION PANEL; Future  - ACETAMINOPHEN; Future  - MITOCHONDRIAL (M2) AB; Future  - ANTI-SMOOTH MUSCLE ABS; Future  - HEP A AB IGM; Future  - HEP B CORE AB IGM  - HEP B SURFACE ANTIGEN; Future  - HEP C VIRUS ANTIBODY; Future    6. Prediabetes  - HEMOGLOBIN A1C; Future  - Basic Metabolic Panel; Future  Chronic condition.  Current status unclear.  Lab test requested to check A1c.  Continue with diet and lifestyle modification      7. 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  Uncontrolled.  Patient to follow-up with neurologist as directed.  As above the patient is still thinking whether or not he will start taking Namenda.    8. Coronary artery disease   Chronic condition.  Noted by CT imaging previously.  The patient asymptomatic.  - REFERRAL TO CARDIOLOGY    9. Kidney stone  Chronic condition.  The patient asymptomatic.  Recommend patient to stay well-hydrated.  Continue to monitor    10. Dyslipidemia  - Lipid Profile; Future  This is a chronic condition.  Current status unclear.  Lipid panel requested.  Continue with low-fat low-cholesterol diet      Above plans and recommendations written down on paper for the patient          Time spent:   42  minutes     Reviewed medical records including:  April 14, 2025 neurology note  8/13/2025 orthopedics note  December 11, 2024 medical office note  November 11, 2024 orthopedics note  November 4, 2024 medical office note  October 9, 2024 neurology note  Laboratory data November 21, 2024, October 30, 2024, October 10, 2024  Radiology reports November 21, 2024, November 2, 2024    Total time includes face to face time and non-face to face time.  Chart  review before the visit, the actual patient visit, and time spent on documentation in the EMR after the visit.  Chart review/prep, review of other providers' records, Independent review of imagings/labs ,  time for history/examination , pt's counseling/education, ordering, prescribing, treatment plan discussed with patient, and care coordination.              Please note that this dictation was created using voice recognition software. I have made every reasonable attempt to correct obvious errors, but I expect that there are errors of grammar and possibly content that I did not discover before finalizing the note.    Thom Bose MD  Internal Medicine  [facility]

## 2025-04-15 NOTE — ASSESSMENT & PLAN NOTE
New condition.  The patient reported low back pain started approximately 4 weeks ago.  Reported that he has been very active at home including heavy lifting, cutting trees, mowing the lawn etc.  Denies recent trauma or injury.  Patient denies bowel or bladder dysfunction.  Denies motor weakness.  The pain is in the low back region.  Pain currently rated 5/10 in intensity.

## 2025-04-15 NOTE — ASSESSMENT & PLAN NOTE
This is a chronic condition.  The patient is presently taking omeprazole.  The patient denies nausea vomiting or dysphagia.

## 2025-04-15 NOTE — ASSESSMENT & PLAN NOTE
Chronic condition.  The patient presently on diet therapy.  Lab test requested to check A1c.  Patient asymptomatic

## 2025-04-15 NOTE — ASSESSMENT & PLAN NOTE
Chronic condition  Noted with previous CT imaging.  Patient currently asymptomatic.  He denied fever chill dysuria or hematuria.

## 2025-04-15 NOTE — ASSESSMENT & PLAN NOTE
Chronic condition.  Status post prostatectomy.  Patient followed by urology service.  PSA October 2024 was  0.02 stable compared to previous result.  Patient denies dysuria or hematuria.

## 2025-04-23 NOTE — Clinical Note
REFERRAL APPROVAL NOTICE         Sent on April 23, 2025                   Christophe Bentley Darrius  [address] [place] [address]                   Dear Mr. Rizo,    After a careful review of the medical information and benefit coverage, [facility] has processed your referral. See below for additional details.    If applicable, you must be actively enrolled with your insurance for coverage of the authorized service. If you have any questions regarding your coverage, please contact your insurance directly.    REFERRAL INFORMATION   Referral #:  [id number]  Referred-To Department    Referred-By Provider:  Physical Therapy    Thom Bose M.D.   [facility]      [address]  [facility] [address]  [phone] [address] [place] [address], [address]  [place] [address]  [phone]    Referral Start Date:  04/15/2025  Referral End Date:   04/15/2026             SCHEDULING  If you do not already have an appointment, please call [phone] to make an appointment.     MORE INFORMATION  If you do not already have a [employer] account, sign up at: [email or web address]  You can access your medical information, make appointments, see lab results, billing information, and more.  If you have questions regarding this referral, please contact  the [facility] Referrals department at:             [phone]. Monday - Friday 8:00AM - 5:00PM.     Sincerely,    [facility]

## 2025-04-24 ENCOUNTER — TELEPHONE (OUTPATIENT)
Dept: HEALTH INFORMATION MANAGEMENT | Facility: OTHER | Age: 70
End: 2025-04-24
Payer: MEDICARE

## 2025-04-29 ENCOUNTER — HOSPITAL ENCOUNTER (OUTPATIENT)
Dept: LAB | Facility: MEDICAL CENTER | Age: 70
End: 2025-04-29
Attending: INTERNAL MEDICINE
Payer: MEDICARE

## 2025-04-29 DIAGNOSIS — R73.03 PREDIABETES: Chronic | ICD-10-CM

## 2025-04-29 DIAGNOSIS — R74.8 LIVER ENZYME ELEVATION: Chronic | ICD-10-CM

## 2025-04-29 DIAGNOSIS — E78.5 DYSLIPIDEMIA: ICD-10-CM

## 2025-04-29 LAB
ALBUMIN SERPL BCP-MCNC: 4.2 G/DL (ref 3.2–4.9)
ALP SERPL-CCNC: 61 U/L (ref 30–99)
ALT SERPL-CCNC: 76 U/L (ref 2–50)
ANION GAP SERPL CALC-SCNC: 12 MMOL/L (ref 7–16)
APAP SERPL-MCNC: <5 UG/ML (ref 10–30)
AST SERPL-CCNC: 34 U/L (ref 12–45)
BILIRUB CONJ SERPL-MCNC: 0.3 MG/DL (ref 0.1–0.5)
BILIRUB INDIRECT SERPL-MCNC: 0.4 MG/DL (ref 0–1)
BILIRUB SERPL-MCNC: 0.7 MG/DL (ref 0.1–1.5)
BUN SERPL-MCNC: 9 MG/DL (ref 8–22)
CALCIUM SERPL-MCNC: 9.2 MG/DL (ref 8.5–10.5)
CHLORIDE SERPL-SCNC: 107 MMOL/L (ref 96–112)
CHOLEST SERPL-MCNC: 102 MG/DL (ref 100–199)
CO2 SERPL-SCNC: 22 MMOL/L (ref 20–33)
CREAT SERPL-MCNC: 0.82 MG/DL (ref 0.5–1.4)
EST. AVERAGE GLUCOSE BLD GHB EST-MCNC: 120 MG/DL
FASTING STATUS PATIENT QL REPORTED: NORMAL
GFR SERPLBLD CREATININE-BSD FMLA CKD-EPI: 93 ML/MIN/1.73 M 2
GLUCOSE SERPL-MCNC: 91 MG/DL (ref 65–99)
HAV IGM SERPL QL IA: NORMAL
HBA1C MFR BLD: 5.8 % (ref 4–5.6)
HBV CORE AB SERPL QL IA: REACTIVE
HBV SURFACE AG SER QL: NORMAL
HCV AB SER QL: NORMAL
HDLC SERPL-MCNC: 52 MG/DL
LDLC SERPL CALC-MCNC: 31 MG/DL
POTASSIUM SERPL-SCNC: 3.9 MMOL/L (ref 3.6–5.5)
PROT SERPL-MCNC: 7.4 G/DL (ref 6–8.2)
SODIUM SERPL-SCNC: 141 MMOL/L (ref 135–145)
TRIGL SERPL-MCNC: 97 MG/DL (ref 0–149)

## 2025-04-29 PROCEDURE — 86709 HEPATITIS A IGM ANTIBODY: CPT

## 2025-04-29 PROCEDURE — 86704 HEP B CORE ANTIBODY TOTAL: CPT | Mod: GA

## 2025-04-29 PROCEDURE — 87340 HEPATITIS B SURFACE AG IA: CPT | Mod: GA

## 2025-04-29 PROCEDURE — 80143 DRUG ASSAY ACETAMINOPHEN: CPT

## 2025-04-29 PROCEDURE — 80076 HEPATIC FUNCTION PANEL: CPT

## 2025-04-29 PROCEDURE — 83036 HEMOGLOBIN GLYCOSYLATED A1C: CPT | Mod: GA

## 2025-04-29 PROCEDURE — 86803 HEPATITIS C AB TEST: CPT

## 2025-04-29 PROCEDURE — 86381 MITOCHONDRIAL ANTIBODY EACH: CPT

## 2025-04-29 PROCEDURE — 80048 BASIC METABOLIC PNL TOTAL CA: CPT

## 2025-04-29 PROCEDURE — 86015 ACTIN ANTIBODY EACH: CPT

## 2025-04-29 PROCEDURE — 36415 COLL VENOUS BLD VENIPUNCTURE: CPT

## 2025-04-29 PROCEDURE — 80061 LIPID PANEL: CPT

## 2025-05-01 ENCOUNTER — RESULTS FOLLOW-UP (OUTPATIENT)
Dept: MEDICAL GROUP | Facility: PHYSICIAN GROUP | Age: 70
End: 2025-05-01
Payer: MEDICARE

## 2025-05-01 DIAGNOSIS — R74.8 LIVER ENZYME ELEVATION: Chronic | ICD-10-CM

## 2025-05-01 LAB
MITOCHONDRIA M2 IGG SER-ACNC: 4.9 UNITS (ref 0–24.9)
SMA IGG SER-ACNC: 8 UNITS (ref 0–19)

## 2025-05-03 ENCOUNTER — HOSPITAL ENCOUNTER (OUTPATIENT)
Dept: RADIOLOGY | Facility: MEDICAL CENTER | Age: 70
End: 2025-05-03
Attending: INTERNAL MEDICINE
Payer: MEDICARE

## 2025-05-03 DIAGNOSIS — M54.50 ACUTE LOW BACK PAIN, UNSPECIFIED BACK PAIN LATERALITY, UNSPECIFIED WHETHER SCIATICA PRESENT: ICD-10-CM

## 2025-05-03 PROCEDURE — 72110 X-RAY EXAM L-2 SPINE 4/>VWS: CPT

## 2025-05-05 ENCOUNTER — RESULTS FOLLOW-UP (OUTPATIENT)
Dept: MEDICAL GROUP | Facility: PHYSICIAN GROUP | Age: 70
End: 2025-05-05
Payer: MEDICARE

## 2025-06-05 ENCOUNTER — APPOINTMENT (OUTPATIENT)
Dept: PHYSICAL THERAPY | Facility: REHABILITATION | Age: 70
End: 2025-06-05
Attending: INTERNAL MEDICINE
Payer: MEDICARE

## 2025-06-05 DIAGNOSIS — M54.50 ACUTE LOW BACK PAIN, UNSPECIFIED BACK PAIN LATERALITY, UNSPECIFIED WHETHER SCIATICA PRESENT: Primary | ICD-10-CM

## 2025-06-10 ENCOUNTER — APPOINTMENT (OUTPATIENT)
Dept: PHYSICAL THERAPY | Facility: REHABILITATION | Age: 70
End: 2025-06-10
Attending: INTERNAL MEDICINE
Payer: MEDICARE

## 2025-06-10 DIAGNOSIS — M54.50 ACUTE LOW BACK PAIN, UNSPECIFIED BACK PAIN LATERALITY, UNSPECIFIED WHETHER SCIATICA PRESENT: Primary | ICD-10-CM

## 2025-06-12 ENCOUNTER — PHYSICAL THERAPY (OUTPATIENT)
Dept: PHYSICAL THERAPY | Facility: REHABILITATION | Age: 70
End: 2025-06-12
Attending: INTERNAL MEDICINE
Payer: MEDICARE

## 2025-06-12 DIAGNOSIS — M54.50 ACUTE LOW BACK PAIN, UNSPECIFIED BACK PAIN LATERALITY, UNSPECIFIED WHETHER SCIATICA PRESENT: Primary | ICD-10-CM

## 2025-06-12 PROCEDURE — 97110 THERAPEUTIC EXERCISES: CPT

## 2025-06-12 PROCEDURE — 97161 PT EVAL LOW COMPLEX 20 MIN: CPT

## 2025-06-12 PROCEDURE — 97535 SELF CARE MNGMENT TRAINING: CPT

## 2025-06-12 ASSESSMENT — ENCOUNTER SYMPTOMS
PAIN SCALE AT LOWEST: 0
PAIN TIMING: EVERY MORNING
QUALITY: ACHING
PAIN SCALE AT HIGHEST: 4
PAIN SCALE: 0

## 2025-06-12 NOTE — OP THERAPY EVALUATION
Outpatient Physical Therapy  INITIAL EVALUATION    [facility] [address], [address]  [place] [address]  Phone:  [phone]    Date of Evaluation: 06/12/2025    Patient: Christophe Rizo  YOB: 1955  MRN: [id number]     Referring Provider: Thom Bose M.D.  [address] [place] [address]   Referring Diagnosis Low back pain, unspecified [M54.50]     Time Calculation                 Chief Complaint: No chief complaint on file.        Date of onset of impairment: Multiple active episodes found    Subjective:   History of Present Illness:     Mechanism of injury:  Pt. is a 70 Y.O. M who reports around mid March he was overdoing yard work and had increasing LBP. Pt. did not do the medications prescribed, but takes Tylenol PRN.     Chart review:  4/14 Acute low back pain  New condition.  The patient reported low back pain started approximately 4 weeks ago.  Reported that he has been very active at home including heavy lifting, cutting trees, mowing the lawn etc.  Denies recent trauma or injury.  Patient denies bowel or bladder dysfunction.  Denies motor weakness.  The pain is in the low back region.  Pain currently rated 5/10 in intensity.  - DX-LUMBAR SPINE-4+ VIEWS; Future  - Referral to Physical Therapy  - predniSONE (DELTASONE) 10 MG Tab; Take 4 Tablets by mouth every day for 2 days, THEN 3 Tablets every day for 2 days, THEN 2 Tablets every day for 2 days, THEN 1 Tablet every day for 2 days.  Dispense: 20 Tablet; Refill: 0  - gabapentin (NEURONTIN) 100 MG Cap; Take 1 Capsule by mouth 2 times a day.  Dispense: 60 Capsule; Refill: 1  - Advised pt re: neck/ back care, posture and regular stretching exercises.   Rec to maintain ideal weight.   Ice/heat application as directed.  Avoid heavy lifting or activities which may aggravate pt's condition.   Consider the use of over the counter topical treatment such as  Biofreeze as needed     PMHx: significant for  " for [employer] served 20 years     Pain:     Current pain ratin    At best pain ratin    At worst pain ratin    Quality:  Aching    Pain timing:  Every morning    Pain Comments::  PAIN  Location: 1. Low back   Descriptors: 1. ache     Aggravated with am and he thinks when he overdoes it,   Eases with salve, rest   Diagnostic Tests:     X-ray: abnormal (Multilevel L/S DDD/DJd)    Patient Goals:     Patient goals for therapy:  Decreased pain      Past Medical History[1]  Past Surgical History[2]  Social History     Tobacco Use    Smoking status: Never    Smokeless tobacco: Never    Tobacco comments:     quit ,<1 pckyr   Substance Use Topics    Alcohol use: Not Currently     Comment: weekend     Social Hx - Family and Occupational[3]    Objective     Postural Observations  Seated posture: good  Standing posture: good      Hip Screen   Hip range of motion within functional limits with the following exceptions: Exception L hip IR ~10 deg.*    Active Range of Motion     Lumbar   Flexion: within functional limits  Extension: within functional limits  Left lateral flexion: decreased (* R side)  Left rotation: decreased  Right rotation: within functional limits    Joint Play   Spine     Central PA Skipwith        L4: hypomobile with grade 3       L5: hypomobile with grade 3    Unilateral PA Glide (left)        L4: painful with grade 3    Unilateral PA Glide (right)        L4: painful with grade 3      Strength:      Abdominals   Lower abdominals: Able to maintain neutral statically (*)    Lower extremities   Normal left lower extremity strength  Normal right lower extremity strength        Therapeutic Exercises (CPT 65540):     1. L SKTC, piriformis stretch, LTR, 5x5-10\", hep      Therapeutic Exercise Summary: Hep Ref. [id number]    Therapeutic Treatments and Modalities:     1. Self Care ADL Training (CPT 87456)    Therapeutic Treatment and Modalities Summary:   . Pt. was educated in anatomical, " pathoanatomical considerations of PT diagosis and treatment options to address it.  Pt. Also educated in  HEP / self care management  with  a handout   Time-based treatments/modalities:           Assessment, Response and Plan:   Assessment details:  Pt. Is a 70 y.o. M/F who presents with above impairments and S: non I: mild-mod N: l/s ddd/djd S: subacute on chronic. Pt. Is appropriate for skilled PT interventionto improve functional limitations and reach goals.     Barriers to therapy:  None  Prognosis: good    Goals:   Short Term Goals:   Increase AROM of L/S bend to 35, ( in accordance w/ AAOS)  Establish a HEP/self care techniques to be able to lower pain level on own  Pt will demo ability to initiate core contraction mechanics in sidelying, QPed, and standing for improved stability      Short term goal time span:  2-4 weeks      Long Term Goals:    MCID of 12 points on Oswestry LB Questionnaire  Pt. Teaches back a HEP/self care techniques for maintenance    Long term goal time span:  4-6 weeks    Plan:   Therapy options:  Physical therapy treatment to continue  Planned therapy interventions:  Self Care ADL Training (CPT 07489), Therapeutic Activities (CPT 96805), Therapeutic Exercise (CPT 45826), Neuromuscular Re-education (CPT 75669), Manual Therapy (CPT 54506), Hot or Cold Pack Therapy (CPT 75666) and E Stim Unattended (CPT 93923)  Frequency:  2x week  Duration in visits:  8  Discussed with:  Patient    Functional Assessment Used        Referring provider co-signature:  I have reviewed this plan of care and my co-signature certifies the need for services.    Certification Period: 06/12/2025 to  08/07/25    Physician Signature: ________________________________ Date: ______________                      [1]   Past Medical History:  Diagnosis Date    Cancer (HCC) 2013    prostate    Dyslipidemia 8/10/2010    Essential hypertension 5/22/2020   [2]   Past Surgical History:  Procedure Laterality Date    PB SHLDR  ARTHROSCOP,SURG,W/ROTAT CUFF REPB Right 8/20/2024    Procedure: RIGHT SHOULDER ARTHROSCOPY ROTATOR CUFF REPAIR;  Surgeon: Jose C Marinelli M.D.;  Location: [facility];  Service: Orthopedics    PB REPAIR BICEPS LONG TENDON Right 8/20/2024    Procedure: POSSIBLE RIGHT BICEPS TENOTOMY, POSSIBLE RIGHT OPEN SUBPECTORAL TENODESIS;  Surgeon: Jose C Marinelli M.D.;  Location: [facility];  Service: Orthopedics    [place] SHLDR ARTHROSCOP PART DEBRIDE 1-2 Right 8/20/2024    Procedure: RIGHT LABRAL DEBRIDEMENT;  Surgeon: Jose C Marinelli M.D.;  Location: [facility];  Service: Orthopedics    NH SHLDR ARTHROSCOP,PART ACROMIOPLAS Right 8/20/2024    Procedure: RIGHT SUBACROMIAL DECOMPRESSION, REPAIRS AS INDICATED;  Surgeon: Jose C Marinelli M.D.;  Location: [facility];  Service: Orthopedics    ELBOW EXPLORATION  2005    cubital tunnel    BOWEL RESECTION  1996    volvulus, small intestine    COLONOSCOPY  1996, 2009    neg    PROSTATECTOMY, RADIAL Bilateral    [3]   Family and Occupational History  Socioeconomic History    Marital status:      Spouse name: Not on file    Number of children: Not on file    Years of education: Not on file    Highest education level: Not on file   Occupational History    Not on file

## 2025-06-17 ENCOUNTER — PHYSICAL THERAPY (OUTPATIENT)
Dept: PHYSICAL THERAPY | Facility: REHABILITATION | Age: 70
End: 2025-06-17
Attending: INTERNAL MEDICINE
Payer: MEDICARE

## 2025-06-17 DIAGNOSIS — M54.50 ACUTE LOW BACK PAIN WITHOUT SCIATICA, UNSPECIFIED BACK PAIN LATERALITY: Primary | ICD-10-CM

## 2025-06-17 PROCEDURE — 97110 THERAPEUTIC EXERCISES: CPT

## 2025-06-17 PROCEDURE — 97140 MANUAL THERAPY 1/> REGIONS: CPT

## 2025-06-17 NOTE — OP THERAPY DAILY TREATMENT
"  Outpatient Physical Therapy  DAILY TREATMENT     [facility] [address], [address]  [place] [address]  Phone:  [phone]    Date: 06/17/2025    Patient: Christophe Rizo  YOB: 1955  MRN: [id number]     Time Calculation    Start time: 0800  Stop time: 0845 Time Calculation (min): 45 minutes         Chief Complaint: No chief complaint on file.    Visit #: 2    SUBJECTIVE:  The exercises you gave me helps, I do them in the morning.   Location: 1. Low back   Descriptors: 1. ache     Aggravated with am and he thinks when he overdoes it,   Eases with salve, rest     OBJECTIVE:  Current objective measures: Lumbar   Flexion: within functional limits  Extension: within functional limits  Left lateral flexion: decreased (* R side)  Left rotation: decreased  Palp: L4,5SP          Therapeutic Exercises (CPT 90059):     1. 1. DLP 2. SLP R, L, 1. 20 4C 2. 14 4C    2. crunch, obliques with ab  ball, 10 , 8 ea. side    3. bridge w/adductor squeeze, 6    4. LTR, 5    5. HSS, 30\" ea    6. pelvic shift ag. wall - r side facing, 2x20\"    7. Jose Manuel DL    8. balance    Therapeutic Treatments and Modalities:     1. Manual Therapy (CPT 26318), l/s    Therapeutic Treatment and Modalities Summary: MT L/S :Paivms L4, 5 PA grade 3 1-2 bouts ea; L, R Unilateral PA L5 grade 3+ 2-3 bouts ea      Comments: CP-post treat, mild pain             Time-based treatments/modalities:    Physical Therapy Timed Treatment Charges  Manual therapy minutes (CPT 24482): 15 minutes  Therapeutic exercise minutes (CPT 98400): 30 minutes      Pain rating (1-10) before /after/treatment:1 .5    ASSESSMENT: S: non I: mild-mod N: l/s ddd/djd S: subacute on chronic.     Response to treatment: toleates all well, reduced tension in back.     PLAN/RECOMMENDATIONS:   Plan for treatment: therapy treatment to continue next visit.  Planned interventions for next visit: continue with current treatment.         "

## 2025-06-19 ENCOUNTER — PHYSICAL THERAPY (OUTPATIENT)
Dept: PHYSICAL THERAPY | Facility: REHABILITATION | Age: 70
End: 2025-06-19
Attending: INTERNAL MEDICINE
Payer: MEDICARE

## 2025-06-19 DIAGNOSIS — M54.50 LOW BACK PAIN, UNSPECIFIED BACK PAIN LATERALITY, UNSPECIFIED CHRONICITY, UNSPECIFIED WHETHER SCIATICA PRESENT: Primary | ICD-10-CM

## 2025-06-19 PROCEDURE — 97140 MANUAL THERAPY 1/> REGIONS: CPT

## 2025-06-19 PROCEDURE — 97110 THERAPEUTIC EXERCISES: CPT

## 2025-06-19 NOTE — OP THERAPY DAILY TREATMENT
"  Outpatient Physical Therapy  DAILY TREATMENT     [facility] Outpatient Physical Therapy [address], [address]  [place] [address]  Phone:  [phone]    Date: 06/19/2025    Patient: Christophe Rizo  YOB: 1955  MRN: [id number]     Time Calculation    Start time: 0845  Stop time: 0930 Time Calculation (min): 45 minutes         Chief Complaint: No chief complaint on file.    Visit #: 3    SUBJECTIVE:  Mornings are worse TOD. Pt. States he will be doing a distance road trip around the July holiday.    Pain rating (1-10) before: 2/10  Location: 1. Low back   Descriptors: 1. ache     Aggravated with am and he thinks when he overdoes it, Eases with salve, rest     OBJECTIVE:  Current objective measures: Lumbar   Flexion: within functional limits  Extension: within functional limits  Left lateral flexion: decreased (* R side)  Left rotation: decreased  Palp: L5 tps          Therapeutic Exercises (CPT 58156):     1. 1. DLP 2. SLP R, L, 1. 20 4C 2. 14 4C, nt    2. TvA march, 8-10  ea. side    3. bridge w/adductor squeeze, 6    4. LTR, 5    5. HSS, 30\" ea    6. pelvic shift ag. wall - r side facing, 2x20\"    7. Jose Manuel DL    8. balance    Therapeutic Treatments and Modalities:     1. Manual Therapy (CPT 32799), l/s    Therapeutic Treatment and Modalities Summary: MT L/S :Paivms L4, 5 PA grade 3 1-2 bouts ea; L, R Unilateral PA L5 grade 3+ 2-3 bouts ea      Comments: CP-post treat, mild pain             Time-based treatments/modalities:    Physical Therapy Timed Treatment Charges  Manual therapy minutes (CPT 45496): 15 minutes  Therapeutic exercise minutes (CPT 30100): 30 minutes    ASSESSMENT: S: non I: mild-mod N: l/s ddd/djd S: subacute on chronic.     Response to treatment: crunching caused inc. LBP; beg. Pilates/spinal stab. ex. were better. Pt. returns correct demo of the HEP to carryout correctly at home with the instructional handout.       PLAN/RECOMMENDATIONS:   Plan for treatment: therapy " treatment to continue next visit.  Planned interventions for next visit: teach stretching exercises he can do a rest stops. continue with current treatment.

## 2025-06-24 ENCOUNTER — PHYSICAL THERAPY (OUTPATIENT)
Dept: PHYSICAL THERAPY | Facility: REHABILITATION | Age: 70
End: 2025-06-24
Attending: INTERNAL MEDICINE
Payer: MEDICARE

## 2025-06-24 DIAGNOSIS — M54.50 ACUTE LOW BACK PAIN, UNSPECIFIED BACK PAIN LATERALITY, UNSPECIFIED WHETHER SCIATICA PRESENT: Primary | ICD-10-CM

## 2025-06-24 PROCEDURE — 97110 THERAPEUTIC EXERCISES: CPT

## 2025-06-24 PROCEDURE — 97535 SELF CARE MNGMENT TRAINING: CPT

## 2025-06-24 NOTE — OP THERAPY DAILY TREATMENT
"  Outpatient Physical Therapy  DAILY TREATMENT     [facility] Outpatient Physical Therapy [place] [id number] [facility], [address]  [place] [address]  Phone:  [phone]    Date: 06/24/2025    Patient: Christophe Rizo  YOB: 1955  MRN: [id number]     Time Calculation    Start time: 0800  Stop time: 0845 Time Calculation (min): 45 minutes         Chief Complaint: No chief complaint on file.    Visit #: 4    SUBJECTIVE:  mornings are still the worse TOD. Pt. States he will be doing a distance road trip July 5th holiday.    Pain rating (1-10) before: 2/10// 0/10  Location: 1. Low back   Descriptors: 1. ache     Aggravated with am and he thinks when he overdoes it, Eases with salve, rest     OBJECTIVE:  Current objective measures: Lumbar   Left lateral flexion: decreased (* R side)  Left rotation: decreased          Therapeutic Exercises (CPT 03500):     1. 1. DLP 2. SLP R, L, 1. 20 5C 2. 14 4C    2. TvA march, 8-10  ea. side, nt    3. bridge w/adductor squeeze, 1x 2'    4. LTR, 5, nt    5. sit piriformis & HSS, 1x 30\" ea, +    6. pelvic shift ag. wall - r side facing, 2x20\"    7. Jose Manuel DL, 10    9. 1/1 kneel hip flexor stretch, 1x30\", +      Therapeutic Exercise Summary: +Hep ref. [email or web address]    Therapeutic Treatments and Modalities:     1. Manual Therapy (CPT 52157), l/s, not tested    Therapeutic Treatment and Modalities Summary: MT L/S :Paivms L4, 5 PA grade 3 1-2 bouts ea; L, R Unilateral PA L5 grade 3+ 2-3 bouts ea      Comments: CP-post treat, mild pain             Time-based treatments/modalities:    Physical Therapy Timed Treatment Charges  Functional training, self care minutes (CPT 49525): 15 minutes  Therapeutic exercise minutes (CPT 79128): 30 minutes    ASSESSMENT: S: non I: mild-mod N: l/s ddd/djd S: subacute on chronic.     Response to treatment: Pt. returns correct demo of the car trip stretches to carryout correctly at home with the instructional handout, but may need review.     "   PLAN/RECOMMENDATIONS:   Plan for treatment: therapy treatment to continue next visit.  Planned interventions for next visit: may need MT/TE combo continue with current treatment.

## 2025-06-26 ENCOUNTER — PHYSICAL THERAPY (OUTPATIENT)
Dept: PHYSICAL THERAPY | Facility: REHABILITATION | Age: 70
End: 2025-06-26
Attending: INTERNAL MEDICINE
Payer: MEDICARE

## 2025-06-26 DIAGNOSIS — M54.50 ACUTE LOW BACK PAIN WITHOUT SCIATICA, UNSPECIFIED BACK PAIN LATERALITY: Primary | ICD-10-CM

## 2025-06-26 PROCEDURE — 97110 THERAPEUTIC EXERCISES: CPT

## 2025-06-26 PROCEDURE — 97530 THERAPEUTIC ACTIVITIES: CPT

## 2025-06-26 NOTE — OP THERAPY DAILY TREATMENT
"  Outpatient Physical Therapy  DAILY TREATMENT     [facility] Outpatient Physical Therapy [place] [address] [facility], [address]  [place] [address]  Phone:  [phone]    Date: 06/26/2025    Patient: Christophe Rizo  YOB: 1955  MRN: [id number]     Time Calculation    Start time: 0800  Stop time: 0845 Time Calculation (min): 45 minutes         Chief Complaint: No chief complaint on file.    Visit #: 5    SUBJECTIVE:  mornings are still the worse TOD, however he does his stretches and he's good. Pt. States he will be doing a distance road trip July 5th holiday.    Pain rating (1-10) before: 2/10// 0/10  Location: 1. Low back   Descriptors: 1. ache     Aggravated with am and he thinks when he overdoes it, Eases with salve, rest     OBJECTIVE:  Current objective measures: Lumbar   Left lateral flexion: decreased // = to R (*R side)  Left rotation: decreased          Therapeutic Exercises (CPT 55665):     1. 1. DLP 2. SLP R, L, 1. 20 5C 2. 14 5C    2. TvA march, 8-10  ea. side, nt    3. bridge w/adductor squeeze, 1x 2'    4. LTR, 5, nt    5. sit piriformis & HSS, L, R 1x 30\" ea, +    6. pelvic shift ag. wall - r side facing, 2x20\"    7. Jose Manuel DL, 10 2# bar, Demo, VC for body mechanics    8. side step, 4 x 10 steps w/blueTB    9. 1/1 kneel hip flexor stretch, L, R 1x30\", +Vc/Tc for hip IR    10. trigger point  L Lx psm, 5x    12. wall stretch, side bend, thred the needle      Therapeutic Exercise Summary: +Hep ref. [id number]    Therapeutic Treatments and Modalities:     1. Manual Therapy (CPT 75132), l/s, not tested    2. Therapeutic Activities (CPT 55142), lifting techniques    Therapeutic Treatment and Modalities Summary: 1. Squat 6x ground to waist 7#  2. 1/2 kneel 5x ground to waist w/SB& 7# ball             Time-based treatments/modalities:    Physical Therapy Timed Treatment Charges  Therapeutic activity minutes (CPT 58618): 15 minutes  Therapeutic exercise minutes (CPT 78211): 25 minutes    ASSESSMENT: S: " non I: mild-mod N: l/s ddd/djd S: subacute on chronic.     Response to treatment: introduced lifting techniques, req. Demo, VC for safe/correct body mechnanics.  Pt. returns correct demo of the car trip stretches     PLAN/RECOMMENDATIONS:   Plan for treatment: therapy treatment to continue next visit.  Planned interventions for next visit: may need MT/TE combo continue with current treatment.

## 2025-07-01 ENCOUNTER — PHYSICAL THERAPY (OUTPATIENT)
Dept: PHYSICAL THERAPY | Facility: REHABILITATION | Age: 70
End: 2025-07-01
Attending: INTERNAL MEDICINE
Payer: MEDICARE

## 2025-07-01 DIAGNOSIS — M54.50 ACUTE MIDLINE LOW BACK PAIN WITHOUT SCIATICA: Primary | ICD-10-CM

## 2025-07-01 PROCEDURE — 97110 THERAPEUTIC EXERCISES: CPT

## 2025-07-01 PROCEDURE — 97535 SELF CARE MNGMENT TRAINING: CPT

## 2025-07-01 NOTE — OP THERAPY DAILY TREATMENT
"  Outpatient Physical Therapy  DAILY TREATMENT     [facility] Outpatient Physical Therapy [address] [facility], [address]  [place] [address]  Phone:  [phone]    Date: 07/01/2025    Patient: Christophe Rizo  YOB: 1955  MRN: [id number]     Time Calculation    Start time: 0800  Stop time: 0845 Time Calculation (min): 45 minutes         Chief Complaint: No chief complaint on file.    Visit #: 6    SUBJECTIVE:  mornings are still the worse TOD.Pt. States he felt a sharp pain in his R shin the other day.     long distance road trip on July 5th holiday.    Pain rating (1-10) before: 2/10// 0/10  Location: 1. Low back   Descriptors: 1. ache     Aggravated with am and he thinks when he overdoes it, Eases with salve, rest     OBJECTIVE:  Current objective measures: Lumbar   Left lateral flexion: decreased // = to R (*R side)  Left rotation: decreased          Therapeutic Exercises (CPT 83481):     1. 1. DLP 2. SLP R, L, 1. 10 5C 2. 10 5C    2. TvA march, 8-10  ea. side, nt    3. bridge w/adductor squeeze, 1x 2', nt    4. LTR, 5, nt    5. sit piriformis & HSS, L, R 1x 30\" ea, +demo's I    6. pelvic shift ag. wall - r side facing, 2x20\", nt    7. Jose Manuel DL, 5 2# bar, min. VC for body mech    8. side step, 4 x 10 steps w/blueTB, nt    9. 1/1 kneel hip flexor stretch, L, R 1x30\", +Vc/Tc for set up: hip IR    10. wall stretch, side bend, thred the needle      Therapeutic Exercise Summary: +Hep ref. [id number]    Therapeutic Treatments and Modalities:     1. Manual Therapy (CPT 14028), l/s, not tested    2. Therapeutic Activities (CPT 20395), lifting techniques    Therapeutic Treatment and Modalities Summary: 1. Squat 6x waist to waist 11#  2. 1/2 kneel  ground to waist 11# ball             Time-based treatments/modalities:    Physical Therapy Timed Treatment Charges  Functional training, self care minutes (CPT 07048): 15 minutes  Therapeutic exercise minutes (CPT 08066): 15 minutes    ASSESSMENT: S: non I: mild-mod " N: l/s ddd/djd S: subacute on chronic.     Response to treatment: continued stretching and lifting techniques, req. Minor Demo, VC for safe/correct body mechnanics.  Pt. returns correct demo of the car trip stretches     PLAN/RECOMMENDATIONS:   Plan for treatment: therapy treatment to continue next visit.  Planned interventions for next visit: may need MT/TE combo continue with current treatment.

## 2025-07-03 ENCOUNTER — APPOINTMENT (OUTPATIENT)
Dept: MEDICAL GROUP | Facility: PHYSICIAN GROUP | Age: 70
End: 2025-07-03
Payer: MEDICARE

## 2025-07-03 ENCOUNTER — TELEPHONE (OUTPATIENT)
Dept: MEDICAL GROUP | Facility: PHYSICIAN GROUP | Age: 70
End: 2025-07-03

## 2025-07-03 ENCOUNTER — PHYSICAL THERAPY (OUTPATIENT)
Dept: PHYSICAL THERAPY | Facility: REHABILITATION | Age: 70
End: 2025-07-03
Attending: INTERNAL MEDICINE
Payer: MEDICARE

## 2025-07-03 VITALS
WEIGHT: 135.6 LBS | BODY MASS INDEX: 23.15 KG/M2 | HEART RATE: 66 BPM | RESPIRATION RATE: 16 BRPM | TEMPERATURE: 97.7 F | HEIGHT: 64 IN | DIASTOLIC BLOOD PRESSURE: 70 MMHG | SYSTOLIC BLOOD PRESSURE: 128 MMHG | OXYGEN SATURATION: 66 %

## 2025-07-03 DIAGNOSIS — Z85.46 HISTORY OF PROSTATE CANCER: ICD-10-CM

## 2025-07-03 DIAGNOSIS — R73.03 PREDIABETES: Chronic | ICD-10-CM

## 2025-07-03 DIAGNOSIS — M54.50 ACUTE BILATERAL LOW BACK PAIN WITHOUT SCIATICA: Primary | ICD-10-CM

## 2025-07-03 DIAGNOSIS — M54.50 CHRONIC LOW BACK PAIN, UNSPECIFIED BACK PAIN LATERALITY, UNSPECIFIED WHETHER SCIATICA PRESENT: ICD-10-CM

## 2025-07-03 DIAGNOSIS — Z90.49 HISTORY OF RESECTION OF SMALL BOWEL: Chronic | ICD-10-CM

## 2025-07-03 DIAGNOSIS — E78.5 DYSLIPIDEMIA: Chronic | ICD-10-CM

## 2025-07-03 DIAGNOSIS — E55.9 VITAMIN D DEFICIENCY: ICD-10-CM

## 2025-07-03 DIAGNOSIS — I10 ESSENTIAL HYPERTENSION: Chronic | ICD-10-CM

## 2025-07-03 DIAGNOSIS — Z12.5 SCREENING FOR MALIGNANT NEOPLASM OF PROSTATE: ICD-10-CM

## 2025-07-03 DIAGNOSIS — G89.29 CHRONIC LOW BACK PAIN, UNSPECIFIED BACK PAIN LATERALITY, UNSPECIFIED WHETHER SCIATICA PRESENT: ICD-10-CM

## 2025-07-03 DIAGNOSIS — I25.119 CORONARY ARTERY DISEASE INVOLVING NATIVE CORONARY ARTERY OF NATIVE HEART WITH ANGINA PECTORIS (HCC): Chronic | ICD-10-CM

## 2025-07-03 DIAGNOSIS — N20.0 KIDNEY STONE: Chronic | ICD-10-CM

## 2025-07-03 DIAGNOSIS — R74.8 LIVER ENZYME ELEVATION: Primary | Chronic | ICD-10-CM

## 2025-07-03 PROCEDURE — 97110 THERAPEUTIC EXERCISES: CPT

## 2025-07-03 PROCEDURE — 3078F DIAST BP <80 MM HG: CPT | Performed by: INTERNAL MEDICINE

## 2025-07-03 PROCEDURE — 99215 OFFICE O/P EST HI 40 MIN: CPT | Performed by: INTERNAL MEDICINE

## 2025-07-03 PROCEDURE — 97535 SELF CARE MNGMENT TRAINING: CPT

## 2025-07-03 PROCEDURE — 3074F SYST BP LT 130 MM HG: CPT | Performed by: INTERNAL MEDICINE

## 2025-07-03 RX ORDER — ROSUVASTATIN CALCIUM 10 MG/1
10 TABLET, COATED ORAL EVERY EVENING
Qty: 100 TABLET | Refills: 3 | Status: SHIPPED | OUTPATIENT
Start: 2025-07-03

## 2025-07-03 RX ORDER — AMLODIPINE BESYLATE 2.5 MG/1
2.5 TABLET ORAL DAILY
Qty: 100 TABLET | Refills: 3 | Status: SHIPPED | OUTPATIENT
Start: 2025-07-03

## 2025-07-03 ASSESSMENT — FIBROSIS 4 INDEX: FIB4 SCORE: 1.19

## 2025-07-03 NOTE — ASSESSMENT & PLAN NOTE
Chronic condition.  Lab test show liver enzyme mildly elevated.  Previous liver ultrasound unremarkable.  Patient does not drink alcohol.  Patient asymptomatic.    Patient was referred to GI for consultation.

## 2025-07-03 NOTE — ASSESSMENT & PLAN NOTE
Chronic condition for the patient currently taking vitamin D supplementation.  Patient tolerating the treatment well.  No new symptoms reported.

## 2025-07-03 NOTE — PROGRESS NOTES
PRIMARY CARE CLINIC VISIT        Chief Complaint   Patient presents with    Follow-Up      Follow-up low back pain  Elevated liver enzyme  History of prostate cancer  Hypertension  Hyperlipidemia  Prediabetes  Coronary artery disease  Kidney stone  History of resection of small bowel  D deficiency  Lab test results      History of Present Illness     Liver enzyme elevation  Chronic condition.  Lab test show liver enzyme mildly elevated.  Previous liver ultrasound unremarkable.  Patient does not drink alcohol.  Patient asymptomatic.    Patient was referred to GI for consultation.    History of prostate cancer  Chronic condition.  Patient followed by urology.  Patient status post prostatectomy 2013.  Previous PSA in October 2024 0.02.  Patient denies dysuria or hematuria.  No new symptoms reported.    Essential hypertension  Chronic.  The patient currently taking amlodipine 2.5 Mg daily.  Blood pressure has been well-controlled.  The patient requests Rx refill.  Patient denies chest pain or shortness of breath.    Dyslipidemia  This is chronic.  The patient is taking rosuvastatin.  Lipid panel normal.  Patient asymptomatic.    Prediabetes  Chronic condition   the patient is currently on diet therapy.  Previous lab test result discussed with the patient.    Chronic low back pain  Chronic condition.  Previous x-ray discussed with the patient.  He has received physical therapy and his condition has improved.  The patient currently not taking pain medication.    Coronary artery disease involving native coronary artery of native heart with angina pectoris (HCC)  Chronic condition. Noted w CT imaging aug 2024  Patient was referred to cardiology.  Patient has not yet made the appointment.  I have reminded patient to schedule an appointment to see the specialist.  Patient denies chest pain or shortness of breath.    Kidney stone  Chronic condition.  The patient denies kidney stone episode.  Patient denies dysuria or  hematuria.    History of resection of small bowel  Chronic condition diagnosed in 2006 due to small bowel obstruction.  No complication.  The patient denied pain or discomfort.  Patient stated that he had colonoscopy done 2024.  GI recommend to repeat this study in 2034.    Vitamin D deficiency  Chronic condition for the patient currently taking vitamin D supplementation.  Patient tolerating the treatment well.  No new symptoms reported.    Medications Ordered Prior to Encounter[1]     Allergies: Aricept [donepezil] and Asa [aspirin]    Current Medications and Prescriptions Ordered in Epic[2]    Past Medical History[3]    Past Surgical History[4]    Family History   Problem Relation Age of Onset    Cancer Father         prostate, age 90    Stroke Mother     Diabetes Neg Hx     Heart Disease Neg Hx        Tobacco Use History[5]    Social History     Substance and Sexual Activity   Alcohol Use Not Currently    Comment: weekend       Review of systems  As per HPI above. All other systems reviewed and negative.      Past Medical, Social, and Family history reviewed and updated in [facility]       LAB DATA:     I have independently reviewed / interpreted labs    Lab Results   Component Value Date/Time    HBA1C 5.8 (H) 04/29/2025 06:17 AM    HBA1C 5.7 (H) 04/27/2018 07:10 AM    HBA1C 6.0 (H) 12/12/2015 07:22 AM    HBA1C 5.4 05/10/2014 07:14 AM        Lab Results   Component Value Date/Time    WBC 7.4 08/06/2024 04:46 PM    HEMOGLOBIN 14.3 08/06/2024 04:46 PM    HEMATOCRIT 45.0 08/06/2024 04:46 PM    MCV 80.5 (L) 08/06/2024 04:46 PM    PLATELETCT 235 08/06/2024 04:46 PM       Lab Results   Component Value Date/Time    SODIUM 141 04/29/2025 06:17 AM    POTASSIUM 3.9 04/29/2025 06:17 AM    GLUCOSE 91 04/29/2025 06:17 AM    BUN 9 04/29/2025 06:17 AM    CREATININE 0.82 04/29/2025 06:17 AM    CREATININE 0.92 04/09/2010 08:12 AM       Lab Results   Component Value Date/Time    CHOLSTRLTOT 102 04/29/2025 06:17 AM    TRIGLYCERIDE 97  "04/29/2025 06:17 AM    HDL 52 04/29/2025 06:17 AM    LDL 31 04/29/2025 06:17 AM       Lab Results   Component Value Date/Time    ALTSGPT 76 (H) 04/29/2025 06:17 AM          Objective     /70 (BP Location: Left arm, Patient Position: Sitting, BP Cuff Size: Adult)   Pulse 66   Temp 36.5 °C (97.7 °F) (Temporal)   Resp 16   Ht 1.626 m (5' 4\")   Wt 61.5 kg (135 lb 9.6 oz)   SpO2 (!) 66%    Body mass index is 23.28 kg/m².    General: alert in no apparent distress.  Cardiovascular: regular rate and rhythm  Pulmonary: lungs : no wheezing   Gastrointestinal: BS present.   Scar from previous surgery.  Low back: No significant spinal deformity noted.   Mild spasm noted w palpation of the paravertebral region          Assessment and Plan     1. Chronic low back pain,  Chronic condition.  Improved status.  Advised the patient to continue with stretching exercises as recommended by physical therapist.  Patient does not take any pain medication at this time.  - Advised pt re: neck/ back care, posture and regular stretching exercises.   Rec to maintain ideal weight.   Ice/heat application as directed.  Avoid heavy lifting or activities which may aggravate pt's condition.   Consider the use of over the counter topical treatment such as  Biofreeze as needed     2. Liver enzyme elevation  Chronic condition.  Stable.  Exact cause unclear.  Patient does not drink alcohol.  - Referral to Gastroenterology  - HEPATIC FUNCTION PANEL; Future    3. Dyslipidemia  Stable stable.  Patient on continue rosuvastatin 10 mg daily.  Repeat lab test in 6 months.  Continue with low-fat low-cholesterol diet  - rosuvastatin (CRESTOR) 10 MG Tab; Take 1 Tablet by mouth every evening.  Dispense: 100 Tablet; Refill: 3  - Lipid Profile; Future    4. Essential hypertension  Stable.  BP normal today.  Continue amlodipine 2.5 Mg daily.  Continue to monitor blood pressure weekly basis at home    - amLODIPine (NORVASC) 2.5 MG Tab; Take 1 Tablet by mouth " every day.  Dispense: 100 Tablet; Refill: 3  - Basic Metabolic Panel; Future  - CBC WITH DIFFERENTIAL; Future  - TSH WITH REFLEX TO FT4; Future  - MICROALBUMIN CREAT RATIO URINE; Future    5. Prediabetes  This is chronic condition.  Stable.  A1c slightly elevated.  However patient interested in taking medication.  To monitor  - HEMOGLOBIN A1C; Future    6. History of prostate cancer  Chronic condition.  Status post prostatectomy.  Patient to follow-up with urology.  PSA ordered for the next visit    7. Coronary artery disease involving native coronary artery of native heart with angina pectoris (HCC)  Chronic condition.  Patient asymptomatic.  Advised the patient to follow-up with cardiology service.    8. Kidney stone   chronic.  Patient asymptomatic.  The patient to stay well-hydrated.  Continue to monitor    9. History of resection of small bowel  Chronic condition.  Patient asymptomatic.  Patient to repeat colonoscopy 2034.    10. Vitamin D deficiency  Chronic condition.  Continue vitamin D supplementation.                  Time spent:   42  minutes     Reviewed medical records including:  April 15, 2025 medical office note  April 14, 2025 neurology note  December 11, 2024 medical office note  October 9, 2024 neurology note  August 22, 2020 for medical office note  Radiology report May 3, 2025, 12/21/2024, August 6, 2024  Lab  data April 29, 2025, August 6, 2024, February 2, 2024, October 19, 2023    Total time includes face to face time and non-face to face time.  Chart review before the visit, the actual patient visit, and time spent on documentation in the EMR after the visit.  Chart review/prep, review of other providers' records, Independent review of imagings/labs ,  time for history/examination , pt's counseling/education, ordering, prescribing, treatment plan discussed with patient, and care coordination.      Healthcare Maintenance     Health Maintenance Due   Topic Date Due    Annual Wellness Visit   03/03/2024    COVID-19 Vaccine (5 - 2024-25 season) 09/01/2024               Please note that this dictation was created using voice recognition software. I have made every reasonable attempt to correct obvious errors, but I expect that there are errors of grammar and possibly content that I did not discover before finalizing the note.    Thom Bose MD  Internal Medicine  [facility]       [1]   Current Outpatient Medications on File Prior to Visit   Medication Sig Dispense Refill    lidocaine (LIDODERM) 5 % Patch Apply 1 Patch topically every 24 hours.      cyclobenzaprine (FLEXERIL) 5 mg tablet       lidocaine (XYLOCAINE) 5 % Ointment APPLY PEAS SIZE AMOUNT OF OINTMENT IN THE ANAL CANAL THREE TIMES DAILY      hydrocortisone 2.5 % Cream topical cream       acetaminophen (TYLENOL) 325 MG Tab tablet      Hydrocortisone (PREPARATION H EX)       Ferrous Sulfate Dried (FERROUS SULFATE CR PO) 1 daily      Ascorbic Acid (VITAMIN C) 1000 MG Tab Take  by mouth.      vitamin D (CHOLECALCIFEROL) 1000 UNIT Tab Take 1,000 Units by mouth every day.       No current facility-administered medications on file prior to visit.   [2]   Current Outpatient Medications Ordered in Epic   Medication Sig Dispense Refill    rosuvastatin (CRESTOR) 10 MG Tab Take 1 Tablet by mouth every evening. 100 Tablet 3    amLODIPine (NORVASC) 2.5 MG Tab Take 1 Tablet by mouth every day. 100 Tablet 3    lidocaine (LIDODERM) 5 % Patch Apply 1 Patch topically every 24 hours.      cyclobenzaprine (FLEXERIL) 5 mg tablet       lidocaine (XYLOCAINE) 5 % Ointment APPLY PEAS SIZE AMOUNT OF OINTMENT IN THE ANAL CANAL THREE TIMES DAILY      hydrocortisone 2.5 % Cream topical cream       acetaminophen (TYLENOL) 325 MG Tab tablet      Hydrocortisone (PREPARATION H EX)       Ferrous Sulfate Dried (FERROUS SULFATE CR PO) 1 daily      Ascorbic Acid (VITAMIN C) 1000 MG Tab Take  by mouth.      vitamin D (CHOLECALCIFEROL) 1000 UNIT Tab Take 1,000 Units  by mouth every day.       No current [facility]-ordered facility-administered medications on file.   [3]   Past Medical History:  Diagnosis Date    Cancer (HCC) 2013    prostate    Dyslipidemia 8/10/2010    Essential hypertension 5/22/2020   [4]   Past Surgical History:  Procedure Laterality Date    PB SHLDR ARTHROSCOP,SURG,W/ROTAT CUFF REPB Right 8/20/2024    Procedure: RIGHT SHOULDER ARTHROSCOPY ROTATOR CUFF REPAIR;  Surgeon: Jose C Mrainelli M.D.;  Location: [facility];  Service: Orthopedics    PB REPAIR BICEPS LONG TENDON Right 8/20/2024    Procedure: POSSIBLE RIGHT BICEPS TENOTOMY, POSSIBLE RIGHT OPEN SUBPECTORAL TENODESIS;  Surgeon: Jose C Marinelli M.D.;  Location: [facility];  Service: Orthopedics    LA SHLDR ARTHROSCOP PART DEBRIDE 1-2 Right 8/20/2024    Procedure: RIGHT LABRAL DEBRIDEMENT;  Surgeon: Jose C Marinelli M.D.;  Location: [facility];  Service: Orthopedics    LA SHLDR ARTHROSCOP,PART ACROMIOPLAS Right 8/20/2024    Procedure: RIGHT SUBACROMIAL DECOMPRESSION, REPAIRS AS INDICATED;  Surgeon: Jose C Marinelli M.D.;  Location: [facility];  Service: Orthopedics    ELBOW EXPLORATION  2005    cubital tunnel    BOWEL RESECTION  1996    volvulus, small intestine    COLONOSCOPY  1996, 2009    neg    PROSTATECTOMY, RADIAL Bilateral    [5]   Social History  Tobacco Use   Smoking Status Never   Smokeless Tobacco Never   Tobacco Comments    quit 1976,<1 pckyr

## 2025-07-03 NOTE — ASSESSMENT & PLAN NOTE
Chronic condition.  The patient denies kidney stone episode.  Patient denies dysuria or hematuria.

## 2025-07-03 NOTE — ASSESSMENT & PLAN NOTE
Chronic.  The patient currently taking amlodipine 2.5 Mg daily.  Blood pressure has been well-controlled.  The patient requests Rx refill.  Patient denies chest pain or shortness of breath.

## 2025-07-03 NOTE — ASSESSMENT & PLAN NOTE
Chronic condition   the patient is currently on diet therapy.  Previous lab test result discussed with the patient.

## 2025-07-03 NOTE — ASSESSMENT & PLAN NOTE
Chronic condition.  Patient followed by urology.  Patient status post prostatectomy 2013.  Previous PSA in October 2024 0.02.  Patient denies dysuria or hematuria.  No new symptoms reported.

## 2025-07-03 NOTE — ASSESSMENT & PLAN NOTE
Chronic condition.  Previous x-ray discussed with the patient.  He has received physical therapy and his condition has improved.  The patient currently not taking pain medication.

## 2025-07-03 NOTE — OP THERAPY DAILY TREATMENT
"  Outpatient Physical Therapy  DAILY TREATMENT     [facility] Outpatient Physical Therapy [address] [facility], [address]  [place] [address]  Phone:  [phone]    Date: 07/03/2025    Patient: Christophe Rizo  YOB: 1955  MRN: [id number]     Time Calculation    Start time: 0800  Stop time: 0845 Time Calculation (min): 45 minutes         Chief Complaint: No chief complaint on file.    Visit #: 7    SUBJECTIVE:  mornings are still the worse TOD.Pt. States he felt a sharp pain in his R shin the other day.     long distance road trip on July 5th holiday.    Pain rating (1-10): 2/10// 0/10  Location: 1. Low back   Descriptors: 1. ache     Aggravated with am and he thinks when he overdoes it, Eases with salve, rest     OBJECTIVE:  Current objective measures: Lumbar   Left lateral flexion: decreased // = to R (*R side)  Left rotation: decreased          Therapeutic Exercises (CPT 55652):     1. 1. DLP 2. SLP R, L, 1. 10 6C 2. 10 5C    2. TvA march, 8  ea. side, minor cues for TvA/breath sequence    3. bridge w/adductor squeeze, 1x 2', nt    4. LTR, 5, nt    5. sit piriformis & HSS, L, R 1x 30\" ea, +demo's I    6. pelvic shift ag. wall - r side facing, 3x10\", minor cues VC for body mech    8. side step, 4 x 10 steps w/blueTB, nt    9. 1/1 kneel hip flexor stretch, L, R 1x30\", +demo's I      Therapeutic Exercise Summary: +Hep ref. [id number]    Therapeutic Treatments and Modalities:     2. Self Care ADL Training (CPT 32443), reviewed lifting techniques, and anti-inflammatory diet    Therapeutic Treatment and Modalities Summary: 1. Jose Manuel DL 5x 2# bar  2. 5x 1/2 kneel ground to waist 7# ball             Time-based treatments/modalities:    Physical Therapy Timed Treatment Charges  Functional training, self care minutes (CPT 69450): 15 minutes  Therapeutic exercise minutes (CPT 54207): 30 minutes    ASSESSMENT: S: non I: mild-mod N: l/s ddd/djd S: subacute on chronic.     Response to treatment: Pt. Can self manage " his a.m. stiffness consistently. He is following a HEP. Demo's safe lifting techniques.     PLAN/RECOMMENDATIONS:   Plan for treatment: therapy treatment to continue next visit.  Planned interventions for next visit:  1-2 f/us then d/c continue with current treatment.

## 2025-07-03 NOTE — ASSESSMENT & PLAN NOTE
Chronic condition diagnosed in 2006 due to small bowel obstruction.  No complication.  The patient denied pain or discomfort.  Patient stated that he had colonoscopy done 2024.  GI recommend to repeat this study in 2034.

## 2025-07-03 NOTE — ASSESSMENT & PLAN NOTE
Chronic condition.  Patient was referred to cardiology.  Patient has not yet made the appointment.  I have reminded patient to schedule an appointment to see the specialist.  Patient denies chest pain or shortness of breath.

## 2025-07-08 ENCOUNTER — APPOINTMENT (OUTPATIENT)
Dept: PHYSICAL THERAPY | Facility: REHABILITATION | Age: 70
End: 2025-07-08
Attending: INTERNAL MEDICINE
Payer: MEDICARE

## 2025-07-10 ENCOUNTER — APPOINTMENT (OUTPATIENT)
Dept: PHYSICAL THERAPY | Facility: REHABILITATION | Age: 70
End: 2025-07-10
Attending: INTERNAL MEDICINE
Payer: MEDICARE

## 2025-07-10 NOTE — Clinical Note
REFERRAL APPROVAL NOTICE         Sent on July 10, 2025                   Christophegoldie Bentley Darrius  [address] [place] [address]                   Dear Mr. Rizo,    After a careful review of the medical information and benefit coverage, [facility] has processed your referral. See below for additional details.    If applicable, you must be actively enrolled with your insurance for coverage of the authorized service. If you have any questions regarding your coverage, please contact your insurance directly.    REFERRAL INFORMATION   Referral #:  [id number]  Referred-To Provider    Referred-By Provider:  Gastroenterology    Thom Bose M.D.   [facility]      [address] [place] [id number] [phone] [address] [facility]ly [facility]  [place] [address]  [phone]    Referral Start Date:  07/03/2025  Referral End Date:   07/03/2026             SCHEDULING  If you do not already have an appointment, please call [phone] to make an appointment.     MORE INFORMATION  If you do not already have a [facility] account, sign up at: [email or web address]  You can access your medical information, make appointments, see lab results, billing information, and more.  If you have questions regarding this referral, please contact  the [facility] Referrals department at:             [phone]. Monday - Friday 8:00AM - 5:00PM.     Sincerely,    [facility]

## 2025-07-18 ENCOUNTER — APPOINTMENT (OUTPATIENT)
Dept: PHYSICAL THERAPY | Facility: REHABILITATION | Age: 70
End: 2025-07-18
Attending: INTERNAL MEDICINE
Payer: MEDICARE

## 2025-07-30 ENCOUNTER — APPOINTMENT (OUTPATIENT)
Dept: PHYSICAL THERAPY | Facility: REHABILITATION | Age: 70
End: 2025-07-30
Attending: INTERNAL MEDICINE
Payer: MEDICARE

## 2025-08-12 ENCOUNTER — OFFICE VISIT (OUTPATIENT)
Dept: BEHAVIORAL HEALTH | Facility: CLINIC | Age: 70
End: 2025-08-12
Payer: MEDICARE